# Patient Record
Sex: FEMALE | Race: WHITE | NOT HISPANIC OR LATINO
[De-identification: names, ages, dates, MRNs, and addresses within clinical notes are randomized per-mention and may not be internally consistent; named-entity substitution may affect disease eponyms.]

---

## 2017-11-16 ENCOUNTER — APPOINTMENT (OUTPATIENT)
Dept: FAMILY MEDICINE | Facility: CLINIC | Age: 47
End: 2017-11-16
Payer: COMMERCIAL

## 2017-11-16 VITALS
BODY MASS INDEX: 41.83 KG/M2 | TEMPERATURE: 98 F | WEIGHT: 245 LBS | OXYGEN SATURATION: 98 % | HEART RATE: 88 BPM | HEIGHT: 64 IN | DIASTOLIC BLOOD PRESSURE: 90 MMHG | SYSTOLIC BLOOD PRESSURE: 132 MMHG

## 2017-11-16 PROCEDURE — 90686 IIV4 VACC NO PRSV 0.5 ML IM: CPT

## 2017-11-16 PROCEDURE — G0008: CPT

## 2017-11-16 PROCEDURE — 99396 PREV VISIT EST AGE 40-64: CPT | Mod: 25

## 2017-11-16 PROCEDURE — 36415 COLL VENOUS BLD VENIPUNCTURE: CPT

## 2017-11-21 LAB
25(OH)D3 SERPL-MCNC: 38.3 NG/ML
ALBUMIN SERPL ELPH-MCNC: 4.4 G/DL
ALP BLD-CCNC: 107 U/L
ALT SERPL-CCNC: 23 U/L
ANION GAP SERPL CALC-SCNC: 20 MMOL/L
APPEARANCE: CLEAR
AST SERPL-CCNC: 17 U/L
BASOPHILS # BLD AUTO: 0.01 K/UL
BASOPHILS NFR BLD AUTO: 0.1 %
BILIRUB SERPL-MCNC: <0.2 MG/DL
BILIRUBIN URINE: NEGATIVE
BLOOD URINE: NEGATIVE
BUN SERPL-MCNC: 13 MG/DL
CALCIUM SERPL-MCNC: 9.2 MG/DL
CHLORIDE SERPL-SCNC: 101 MMOL/L
CHOLEST SERPL-MCNC: 205 MG/DL
CHOLEST/HDLC SERPL: 3.6 RATIO
CO2 SERPL-SCNC: 23 MMOL/L
COLOR: YELLOW
CREAT SERPL-MCNC: 0.93 MG/DL
EOSINOPHIL # BLD AUTO: 0.11 K/UL
EOSINOPHIL NFR BLD AUTO: 1.1 %
GLUCOSE QUALITATIVE U: NEGATIVE MG/DL
GLUCOSE SERPL-MCNC: 212 MG/DL
HBA1C MFR BLD HPLC: 8.4 %
HCT VFR BLD CALC: 40.4 %
HDLC SERPL-MCNC: 57 MG/DL
HGB BLD-MCNC: 13.2 G/DL
IMM GRANULOCYTES NFR BLD AUTO: 0.3 %
KETONES URINE: NEGATIVE
LDLC SERPL CALC-MCNC: 123 MG/DL
LEUKOCYTE ESTERASE URINE: NEGATIVE
LYMPHOCYTES # BLD AUTO: 2.54 K/UL
LYMPHOCYTES NFR BLD AUTO: 24.6 %
MAN DIFF?: NORMAL
MCHC RBC-ENTMCNC: 29.5 PG
MCHC RBC-ENTMCNC: 32.7 GM/DL
MCV RBC AUTO: 90.4 FL
MONOCYTES # BLD AUTO: 0.61 K/UL
MONOCYTES NFR BLD AUTO: 5.9 %
NEUTROPHILS # BLD AUTO: 7.01 K/UL
NEUTROPHILS NFR BLD AUTO: 68 %
NITRITE URINE: NEGATIVE
PH URINE: 5
PLATELET # BLD AUTO: 274 K/UL
POTASSIUM SERPL-SCNC: 4.7 MMOL/L
PROT SERPL-MCNC: 7.4 G/DL
PROTEIN URINE: NEGATIVE MG/DL
RBC # BLD: 4.47 M/UL
RBC # FLD: 13.7 %
SODIUM SERPL-SCNC: 144 MMOL/L
SPECIFIC GRAVITY URINE: 1.02
TRIGL SERPL-MCNC: 123 MG/DL
TSH SERPL-ACNC: 1.13 UIU/ML
UROBILINOGEN URINE: NEGATIVE MG/DL
WBC # FLD AUTO: 10.31 K/UL

## 2017-11-30 ENCOUNTER — APPOINTMENT (OUTPATIENT)
Dept: FAMILY MEDICINE | Facility: CLINIC | Age: 47
End: 2017-11-30
Payer: COMMERCIAL

## 2017-11-30 VITALS
HEIGHT: 64 IN | BODY MASS INDEX: 42 KG/M2 | HEART RATE: 84 BPM | WEIGHT: 246 LBS | SYSTOLIC BLOOD PRESSURE: 130 MMHG | DIASTOLIC BLOOD PRESSURE: 80 MMHG

## 2017-11-30 DIAGNOSIS — Z87.09 PERSONAL HISTORY OF OTHER DISEASES OF THE RESPIRATORY SYSTEM: ICD-10-CM

## 2017-11-30 DIAGNOSIS — Z87.898 PERSONAL HISTORY OF OTHER SPECIFIED CONDITIONS: ICD-10-CM

## 2017-11-30 PROCEDURE — 99214 OFFICE O/P EST MOD 30 MIN: CPT

## 2018-01-25 ENCOUNTER — APPOINTMENT (OUTPATIENT)
Dept: FAMILY MEDICINE | Facility: CLINIC | Age: 48
End: 2018-01-25
Payer: COMMERCIAL

## 2018-01-25 VITALS
BODY MASS INDEX: 41.57 KG/M2 | HEART RATE: 90 BPM | DIASTOLIC BLOOD PRESSURE: 86 MMHG | SYSTOLIC BLOOD PRESSURE: 138 MMHG | WEIGHT: 243.5 LBS | HEIGHT: 64 IN

## 2018-01-25 VITALS — DIASTOLIC BLOOD PRESSURE: 84 MMHG | SYSTOLIC BLOOD PRESSURE: 124 MMHG

## 2018-01-25 PROCEDURE — 99214 OFFICE O/P EST MOD 30 MIN: CPT | Mod: 25

## 2018-01-25 PROCEDURE — 36415 COLL VENOUS BLD VENIPUNCTURE: CPT

## 2018-01-27 LAB
ALBUMIN SERPL ELPH-MCNC: 4.6 G/DL
ALP BLD-CCNC: 93 U/L
ALT SERPL-CCNC: 35 U/L
ANION GAP SERPL CALC-SCNC: 17 MMOL/L
AST SERPL-CCNC: 24 U/L
BASOPHILS # BLD AUTO: 0.02 K/UL
BASOPHILS NFR BLD AUTO: 0.2 %
BILIRUB SERPL-MCNC: 0.2 MG/DL
BUN SERPL-MCNC: 15 MG/DL
CALCIUM SERPL-MCNC: 9.8 MG/DL
CHLORIDE SERPL-SCNC: 99 MMOL/L
CO2 SERPL-SCNC: 26 MMOL/L
CREAT SERPL-MCNC: 0.98 MG/DL
EOSINOPHIL # BLD AUTO: 0.09 K/UL
EOSINOPHIL NFR BLD AUTO: 1 %
GLUCOSE SERPL-MCNC: 134 MG/DL
HBA1C MFR BLD HPLC: 7.9 %
HCT VFR BLD CALC: 42.3 %
HGB BLD-MCNC: 13.8 G/DL
IMM GRANULOCYTES NFR BLD AUTO: 0.4 %
LYMPHOCYTES # BLD AUTO: 2.5 K/UL
LYMPHOCYTES NFR BLD AUTO: 26.9 %
MAN DIFF?: NORMAL
MCHC RBC-ENTMCNC: 29.3 PG
MCHC RBC-ENTMCNC: 32.6 GM/DL
MCV RBC AUTO: 89.8 FL
MONOCYTES # BLD AUTO: 0.56 K/UL
MONOCYTES NFR BLD AUTO: 6 %
NEUTROPHILS # BLD AUTO: 6.07 K/UL
NEUTROPHILS NFR BLD AUTO: 65.5 %
PLATELET # BLD AUTO: 311 K/UL
POTASSIUM SERPL-SCNC: 4.4 MMOL/L
PROT SERPL-MCNC: 7.7 G/DL
RBC # BLD: 4.71 M/UL
RBC # FLD: 13 %
SODIUM SERPL-SCNC: 142 MMOL/L
WBC # FLD AUTO: 9.28 K/UL

## 2018-04-30 ENCOUNTER — APPOINTMENT (OUTPATIENT)
Dept: FAMILY MEDICINE | Facility: CLINIC | Age: 48
End: 2018-04-30
Payer: COMMERCIAL

## 2018-04-30 VITALS
SYSTOLIC BLOOD PRESSURE: 120 MMHG | BODY MASS INDEX: 42.34 KG/M2 | DIASTOLIC BLOOD PRESSURE: 70 MMHG | WEIGHT: 248 LBS | HEIGHT: 64 IN | HEART RATE: 84 BPM

## 2018-04-30 PROCEDURE — 36415 COLL VENOUS BLD VENIPUNCTURE: CPT

## 2018-04-30 PROCEDURE — 99214 OFFICE O/P EST MOD 30 MIN: CPT | Mod: 25

## 2018-05-09 LAB
ALBUMIN SERPL ELPH-MCNC: 4.5 G/DL
ALP BLD-CCNC: 97 U/L
ALT SERPL-CCNC: 28 U/L
ANION GAP SERPL CALC-SCNC: 16 MMOL/L
AST SERPL-CCNC: 17 U/L
BASOPHILS # BLD AUTO: 0.02 K/UL
BASOPHILS NFR BLD AUTO: 0.2 %
BILIRUB SERPL-MCNC: 0.3 MG/DL
BUN SERPL-MCNC: 14 MG/DL
CALCIUM SERPL-MCNC: 9.7 MG/DL
CHLORIDE SERPL-SCNC: 99 MMOL/L
CHOLEST SERPL-MCNC: 223 MG/DL
CHOLEST/HDLC SERPL: 4.6 RATIO
CO2 SERPL-SCNC: 26 MMOL/L
CREAT SERPL-MCNC: 0.85 MG/DL
EOSINOPHIL # BLD AUTO: 0.09 K/UL
EOSINOPHIL NFR BLD AUTO: 1 %
GLUCOSE SERPL-MCNC: 194 MG/DL
HCT VFR BLD CALC: 45.5 %
HDLC SERPL-MCNC: 49 MG/DL
HGB BLD-MCNC: 14 G/DL
IMM GRANULOCYTES NFR BLD AUTO: 0.5 %
LDLC SERPL CALC-MCNC: 151 MG/DL
LYMPHOCYTES # BLD AUTO: 2.28 K/UL
LYMPHOCYTES NFR BLD AUTO: 26.5 %
MAN DIFF?: NORMAL
MCHC RBC-ENTMCNC: 29.2 PG
MCHC RBC-ENTMCNC: 30.8 GM/DL
MCV RBC AUTO: 95 FL
MONOCYTES # BLD AUTO: 0.41 K/UL
MONOCYTES NFR BLD AUTO: 4.8 %
NEUTROPHILS # BLD AUTO: 5.77 K/UL
NEUTROPHILS NFR BLD AUTO: 67 %
PLATELET # BLD AUTO: 302 K/UL
POTASSIUM SERPL-SCNC: 4.8 MMOL/L
PROT SERPL-MCNC: 7.7 G/DL
RBC # BLD: 4.79 M/UL
RBC # FLD: 13.6 %
SODIUM SERPL-SCNC: 141 MMOL/L
TRIGL SERPL-MCNC: 117 MG/DL
WBC # FLD AUTO: 8.61 K/UL

## 2018-05-15 LAB — HBA1C MFR BLD HPLC: 8.3 %

## 2018-07-30 ENCOUNTER — APPOINTMENT (OUTPATIENT)
Dept: FAMILY MEDICINE | Facility: CLINIC | Age: 48
End: 2018-07-30
Payer: COMMERCIAL

## 2018-07-30 VITALS
DIASTOLIC BLOOD PRESSURE: 88 MMHG | SYSTOLIC BLOOD PRESSURE: 150 MMHG | HEART RATE: 82 BPM | WEIGHT: 246 LBS | BODY MASS INDEX: 42 KG/M2 | HEIGHT: 64 IN

## 2018-07-30 PROCEDURE — 36415 COLL VENOUS BLD VENIPUNCTURE: CPT

## 2018-07-30 PROCEDURE — 99214 OFFICE O/P EST MOD 30 MIN: CPT | Mod: 25

## 2018-07-30 NOTE — COUNSELING
[Weight management counseling provided] : Weight management [Activity counseling provided] : activity [Target Wt Loss Goal ___] : Target weight loss goal [unfilled] lbs [Weight Self Once Weekly] : Weight self once weekly [Walking] : Walking [___ min/wk activity recom] : [unfilled] min/wk activity recommended [Decrease Portions] : Decrease food portions [Take medications as directed] : Take medications as directed [Keep FS  log to bring to next visit] : Keep finger stick log and  bring  to next visit

## 2018-08-01 NOTE — REVIEW OF SYSTEMS
[see HPI] : see HPI [Negative] : Neurological [Fever] : no fever [Chills] : no chills [Feeling Poorly] : not feeling poorly

## 2018-08-01 NOTE — ASSESSMENT
[FreeTextEntry1] : \par DM   as in HPI   last A1c  7.9 \par Will request consult. \par Advised FU . \par \par LAbs in office today .\par \par Obesity   as in HPI and Plan\par \par Depression   stable  continue FU with NP \par Much support rendered.

## 2018-08-01 NOTE — PHYSICAL EXAM
[General Appearance - Alert] : alert [General Appearance - In No Acute Distress] : in no acute distress [Sclera] : the sclera and conjunctiva were normal [Respiration, Rhythm And Depth] : normal respiratory rhythm and effort [Auscultation Breath Sounds / Voice Sounds] : lungs were clear to auscultation bilaterally [Heart Rate And Rhythm] : heart rate was normal and rhythm regular [Heart Sounds] : normal S1 and S2 [Skin Turgor] : normal skin turgor [Oriented To Time, Place, And Person] : oriented to person, place, and time [FreeTextEntry1] : warm and dry

## 2018-08-01 NOTE — HISTORY OF PRESENT ILLNESS
[Depression] : Depression [Diabetes Mellitus] : Diabetes Mellitus [Obesity] : Obesity [Patient was last seen on ___] : Patient was last seen on [unfilled] [No episodes] : No hypoglycemic episodes since the last visit. [Check glucose ___ x/day] : Patient checks  blood glucose [unfilled]  times a day [Fasting:  ___] : Fasting Blood Sugar: [unfilled] mg/dL [Post-Prandial: ___] : Post-Prandial Blood Sugar: [unfilled] mg/dL [] : sometimes in range [Most Recent A1C: ___] : Most recent A1C was [unfilled] [Target A1C:  ___] : Target A1C is [unfilled] [At intermediate goal] : A1C at intermediate goal [No therapy] : Patient is not on statin therapy [Weight Gain ___ Pounds] : Weight gain of [unfilled] pounds [None] : No weight lost attempts [___ times per week] : Patient exercises [unfilled] times per week [Action] : Action: patient is following a plan to lose weight [Needs reinforcement, provided] : Patient needs reinforcement on understanding of disease, goals and obesity follow-up plan; reinforcement was provided [No New Symptoms] : Patient denies any new symptoms [Stable] : Overall status has been stable [FreeTextEntry6] : Endorses that she consulted with endocrinology Dr. JOBY Alberts and metformin extended release was advised to do GI complaints.\par She is now wearing a glucose monitoring disc on her LEFT upper arm which he started this morning.\par \par States depression "up and down" due to continue legal concern and care of ailing mother. \par COntinues support with psych .   no change in medications. \par \par \par \par HM:  Mammo  Oct. 2017  BiRads 1\par        Colonoscopy   FH of Colon Cancer ( sister) .  2015   Dr. Dulce MUIR in 5 years.  [de-identified] : efforts "good" but has sugar needs  [de-identified] : Follows with N  { Brig[facility] Pierre in [place]   [FreeTextEntry1] : MF initiated in  Nov. 2017 \par much encouragement discussion with Saumya with regard to pathophysiology of diabetes and need for her to engage in herself.  She agrees to try.  At this encounter.  We agree to increase her exercise efforts with her daily walk totaling 40-45 minutes most days of the week.  Decrease sweets and limit to once weekly.\par \par \par In review: Jan. 2018 \par Today presents in FU  reporting compliance with medications.  NO SE. \par HGM  140- 200\par Insurance does not cover Diabetic wellness .   WIll refer to nutritionist. \par Stressors in her life  has lessened. \par In review:  Nov 2017\par Today presents at my request for review of labs most notably with  A1c 8.4 ( 6.8 one year ago ).\par Weight  246 with BMI of 42.23. \par Denies any POLY symptoms. \par FH of DM  in both parents.   Type 2 \par Continues to have considerable stressors with regard to family demands.

## 2018-08-04 LAB
ALBUMIN SERPL ELPH-MCNC: 4.6 G/DL
ALP BLD-CCNC: 89 U/L
ALT SERPL-CCNC: 32 U/L
ANION GAP SERPL CALC-SCNC: 15 MMOL/L
AST SERPL-CCNC: 22 U/L
BASOPHILS # BLD AUTO: 0.02 K/UL
BASOPHILS NFR BLD AUTO: 0.3 %
BILIRUB SERPL-MCNC: 0.3 MG/DL
BUN SERPL-MCNC: 13 MG/DL
CALCIUM SERPL-MCNC: 9.3 MG/DL
CHLORIDE SERPL-SCNC: 99 MMOL/L
CO2 SERPL-SCNC: 25 MMOL/L
CREAT SERPL-MCNC: 0.73 MG/DL
EOSINOPHIL # BLD AUTO: 0.09 K/UL
EOSINOPHIL NFR BLD AUTO: 1.3 %
GLUCOSE SERPL-MCNC: 204 MG/DL
HBA1C MFR BLD HPLC: 8.7 %
HCT VFR BLD CALC: 43.7 %
HGB BLD-MCNC: 13.7 G/DL
IMM GRANULOCYTES NFR BLD AUTO: 0.4 %
LYMPHOCYTES # BLD AUTO: 2.07 K/UL
LYMPHOCYTES NFR BLD AUTO: 29.7 %
MAN DIFF?: NORMAL
MCHC RBC-ENTMCNC: 29.1 PG
MCHC RBC-ENTMCNC: 31.4 GM/DL
MCV RBC AUTO: 93 FL
MONOCYTES # BLD AUTO: 0.33 K/UL
MONOCYTES NFR BLD AUTO: 4.7 %
NEUTROPHILS # BLD AUTO: 4.43 K/UL
NEUTROPHILS NFR BLD AUTO: 63.6 %
PLATELET # BLD AUTO: 260 K/UL
POTASSIUM SERPL-SCNC: 4.7 MMOL/L
PROT SERPL-MCNC: 7.1 G/DL
RBC # BLD: 4.7 M/UL
RBC # FLD: 13.8 %
SODIUM SERPL-SCNC: 139 MMOL/L
TSH SERPL-ACNC: 1.35 UIU/ML
WBC # FLD AUTO: 6.97 K/UL

## 2018-11-07 ENCOUNTER — APPOINTMENT (OUTPATIENT)
Dept: FAMILY MEDICINE | Facility: CLINIC | Age: 48
End: 2018-11-07
Payer: COMMERCIAL

## 2018-11-07 VITALS
DIASTOLIC BLOOD PRESSURE: 82 MMHG | WEIGHT: 244 LBS | BODY MASS INDEX: 41.66 KG/M2 | SYSTOLIC BLOOD PRESSURE: 140 MMHG | HEART RATE: 88 BPM | HEIGHT: 64 IN

## 2018-11-07 PROCEDURE — 36415 COLL VENOUS BLD VENIPUNCTURE: CPT

## 2018-11-07 PROCEDURE — 90686 IIV4 VACC NO PRSV 0.5 ML IM: CPT

## 2018-11-07 PROCEDURE — 99214 OFFICE O/P EST MOD 30 MIN: CPT | Mod: 25

## 2018-11-07 PROCEDURE — G0008: CPT

## 2018-11-07 RX ORDER — METFORMIN HYDROCHLORIDE 500 MG/1
500 TABLET, COATED ORAL DAILY
Qty: 1 | Refills: 0 | Status: DISCONTINUED | COMMUNITY
Start: 2017-11-30 | End: 2018-11-07

## 2018-11-10 NOTE — HISTORY OF PRESENT ILLNESS
[Depression] : Depression [Diabetes Mellitus] : Diabetes Mellitus [Obesity] : Obesity [No episodes] : No hypoglycemic episodes since the last visit. [Check glucose ___ x/day] : Patient checks  blood glucose [unfilled]  times a day [Fasting:  ___] : Fasting Blood Sugar: [unfilled] mg/dL [Post-Prandial: ___] : Post-Prandial Blood Sugar: [unfilled] mg/dL [] : sometimes in range [Target A1C:  ___] : Target A1C is [unfilled] [At intermediate goal] : A1C at intermediate goal [No therapy] : Patient is not on statin therapy [Weight Gain ___ Pounds] : Weight gain of [unfilled] pounds [None] : No weight lost attempts [___ times per week] : Patient exercises [unfilled] times per week [Action] : Action: patient is following a plan to lose weight [Needs reinforcement, provided] : Patient needs reinforcement on understanding of disease, goals and obesity follow-up plan; reinforcement was provided [No New Symptoms] : Patient denies any new symptoms [Stable] : Overall status has been stable [FreeTextEntry1] : MF initiated in  Nov. 2017 \par much encouragement discussion with Saumya with regard to pathophysiology of diabetes and need for her to engage in herself.  She agrees to try.  At this encounter.  We agree to increase her exercise efforts with her daily walk totaling 40-45 minutes most days of the week.  Decrease sweets and limit to once weekly.\par \par \par In review: Jan. 2018 \par Today presents in FU  reporting compliance with medications.  NO SE. \par HGM  140- 200\par Insurance does not cover Diabetic wellness .   WIll refer to nutritionist. \par Stressors in her life  has lessened. \par In review:  Nov 2017\par Today presents at my request for review of labs most notably with  A1c 8.4 ( 6.8 one year ago ).\par Weight  246 with BMI of 42.23. \par Denies any POLY symptoms. \par FH of DM  in both parents.   Type 2 \par Continues to have considerable stressors with regard to family demands.  [Patient was last seen on ___] : Patient was last seen on [unfilled] [FreeTextEntry6] : Today presents in RFU on chronic medical conditions. \par States she has been "OK" and \par Denies any recent ER visits/hospitalizations/ MVA's or MSK injuries.\par Stable medical and social issues. \par \par \par In review July 30 2018 \par Endorses that she consulted with endocrinology Dr. JOBY Alberts and metformin extended release was advised to do GI complaints.\par She is now wearing a glucose monitoring disc on her LEFT upper arm which he started this morning.\par \par States depression "up and down" due to continue legal concern and care of ailing mother. \par COntinues support with psych .   no change in medications. \par \par \par \par HM:  Mammo  Oct. 2017  BiRads 1\par        Colonoscopy   FH of Colon Cancer ( sister) .  2015   Dr. Dulce MUIR in 5 years.  [Most Recent A1C: ___] : Most recent A1C was [unfilled] [EyeExamDate] : 7/18 [de-identified] : efforts "good" but has sugar needs  [de-identified] : Follows with AKASH Pierre in [place]

## 2018-11-12 LAB
ALBUMIN SERPL ELPH-MCNC: 4.5 G/DL
ALP BLD-CCNC: 86 U/L
ALT SERPL-CCNC: 37 U/L
ANION GAP SERPL CALC-SCNC: 16 MMOL/L
AST SERPL-CCNC: 17 U/L
BASOPHILS # BLD AUTO: 0.03 K/UL
BASOPHILS NFR BLD AUTO: 0.3 %
BILIRUB SERPL-MCNC: 0.3 MG/DL
BUN SERPL-MCNC: 12 MG/DL
CALCIUM SERPL-MCNC: 9.8 MG/DL
CHLORIDE SERPL-SCNC: 98 MMOL/L
CO2 SERPL-SCNC: 25 MMOL/L
CREAT SERPL-MCNC: 0.83 MG/DL
EOSINOPHIL # BLD AUTO: 0.12 K/UL
EOSINOPHIL NFR BLD AUTO: 1.3 %
GLUCOSE SERPL-MCNC: 163 MG/DL
HBA1C MFR BLD HPLC: 8 %
HCT VFR BLD CALC: 41.8 %
HGB BLD-MCNC: 13.7 G/DL
IMM GRANULOCYTES NFR BLD AUTO: 0.3 %
LYMPHOCYTES # BLD AUTO: 2.68 K/UL
LYMPHOCYTES NFR BLD AUTO: 28.8 %
MAN DIFF?: NORMAL
MCHC RBC-ENTMCNC: 29.7 PG
MCHC RBC-ENTMCNC: 32.8 GM/DL
MCV RBC AUTO: 90.5 FL
MONOCYTES # BLD AUTO: 0.42 K/UL
MONOCYTES NFR BLD AUTO: 4.5 %
NEUTROPHILS # BLD AUTO: 6.02 K/UL
NEUTROPHILS NFR BLD AUTO: 64.8 %
PLATELET # BLD AUTO: 263 K/UL
POTASSIUM SERPL-SCNC: 4.8 MMOL/L
PROT SERPL-MCNC: 7.4 G/DL
RBC # BLD: 4.62 M/UL
RBC # FLD: 13.9 %
SODIUM SERPL-SCNC: 139 MMOL/L
WBC # FLD AUTO: 9.3 K/UL

## 2018-11-26 ENCOUNTER — MEDICATION RENEWAL (OUTPATIENT)
Age: 48
End: 2018-11-26

## 2019-02-04 ENCOUNTER — APPOINTMENT (OUTPATIENT)
Dept: FAMILY MEDICINE | Facility: CLINIC | Age: 49
End: 2019-02-04
Payer: COMMERCIAL

## 2019-02-04 VITALS
HEIGHT: 64 IN | WEIGHT: 243 LBS | SYSTOLIC BLOOD PRESSURE: 112 MMHG | BODY MASS INDEX: 41.48 KG/M2 | DIASTOLIC BLOOD PRESSURE: 70 MMHG | HEART RATE: 84 BPM

## 2019-02-04 DIAGNOSIS — Z23 ENCOUNTER FOR IMMUNIZATION: ICD-10-CM

## 2019-02-04 PROCEDURE — 36415 COLL VENOUS BLD VENIPUNCTURE: CPT

## 2019-02-04 PROCEDURE — 99396 PREV VISIT EST AGE 40-64: CPT | Mod: 25

## 2019-02-04 NOTE — PHYSICAL EXAM
[General Appearance - Alert] : alert [General Appearance - In No Acute Distress] : in no acute distress [General Appearance - Well Nourished] : well nourished [Sclera] : the sclera and conjunctiva were normal [PERRL With Normal Accommodation] : pupils were equal in size, round, and reactive to light [Extraocular Movements] : extraocular movements were intact [Outer Ear] : the ears and nose were normal in appearance [Hearing Threshold Finger Rub Not [place]] : hearing was normal [Nasal Cavity] : the nasal mucosa and septum were normal [Oropharynx] : the oropharynx was normal [Neck Cervical Mass (___cm)] : no neck mass was observed [Thyroid Diffuse Enlargement] : the thyroid was not enlarged [Respiration, Rhythm And Depth] : normal respiratory rhythm and effort [Exaggerated Use Of Accessory Muscles For Inspiration] : no accessory muscle use [Heart Rate And Rhythm] : heart rate was normal and rhythm regular [Heart Sounds] : normal S1 and S2 [Murmurs] : no murmurs [Full Pulse] : the pedal pulses are present [Edema] : there was no peripheral edema [Veins - Varicosity Changes] : there were no varicosital changes [Bowel Sounds] : normal bowel sounds [Abdomen Soft] : soft [Abdomen Tenderness] : non-tender [Abdomen Mass (___ Cm)] : no abdominal mass palpated [Cervical Lymph Nodes Enlarged Posterior Bilaterally] : posterior cervical [Cervical Lymph Nodes Enlarged Anterior Bilaterally] : anterior cervical [Supraclavicular Lymph Nodes Enlarged Bilaterally] : supraclavicular [No Spinal Tenderness] : no spinal tenderness [Abnormal Walk] : normal gait [Nail Clubbing] : no clubbing  or cyanosis of the fingernails [Involuntary Movements] : no involuntary movements were seen [Skin Turgor] : normal skin turgor [] : no rash [Deep Tendon Reflexes (DTR)] : deep tendon reflexes were 2+ and symmetric [No Focal Deficits] : no focal deficits [Oriented To Time, Place, And Person] : oriented to person, place, and time [Impaired Insight] : insight and judgment were intact [FreeTextEntry1] : DEEPIKA

## 2019-02-04 NOTE — REVIEW OF SYSTEMS
[Feeling Poorly] : feeling poorly [Eyesight Problems] : eyesight problems [Skin Lesions] : skin lesion [Depression] : depression [Negative] : Neurological [Fever] : no fever [Chills] : no chills [Abdominal Pain] : no abdominal pain [Diarrhea] : no diarrhea [Melena] : no melena

## 2019-02-04 NOTE — HEALTH RISK ASSESSMENT
[Good] : ~his/her~  mood as  good [No falls in past year] : Patient reported no falls in the past year [1] : 2) Feeling down, depressed, or hopeless for several days (1) [Patient reported mammogram was normal] : Patient reported mammogram was normal [None] : None [With Family] : lives with family [Employed] : employed [Single] : single [Feels Safe at Home] : Feels safe at home [Fully functional (bathing, dressing, toileting, transferring, walking, feeding)] : Fully functional (bathing, dressing, toileting, transferring, walking, feeding) [Fully functional (using the telephone, shopping, preparing meals, housekeeping, doing laundry, using] : Fully functional and needs no help or supervision to perform IADLs (using the telephone, shopping, preparing meals, housekeeping, doing laundry, using transportation, managing medications and managing finances) [Smoke Detector] : smoke detector [Seat Belt] :  uses seat belt [] : No [de-identified] : ENDO   Dr. 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

## 2019-02-04 NOTE — HISTORY OF PRESENT ILLNESS
[Health Maintenance] : health maintenance [GYN Evaluation] : gynecology [___ Month(s) Ago] : [unfilled] month(s) ago [Unmarried] : unmarried [Working Full Time] : working full time [Occupation ___] : occupation: [unfilled] [Never Smoked Cigarettes] : has never smoked cigarettes [Rare Use] : rare alcohol use [Never] : has never used illicit drugs [Fair] : fair [Reg. Dental Visits] : She has regular dental visits [Vision Problems] : She complains of vision problems [Hearing Loss] : She denies hearing loss [Healthy Diet] : She consumes a diverse and healthy diet [Regular Exercise] : She exercises regularly [Perimenopausal] : the patient is perimenopausal [Reviewed and Current] : cancer screening reviewed and current [de-identified] : Last month  [de-identified] : Dr. Stroud [de-identified] :  with mother  [FreeTextEntry8] : Walking daily 2-3 miles daily    Follows with nutritionist now monthly  [FreeTextEntry9] : LMP    [de-identified] : Mammogram/ US  2017     at [facility]  BiRads 2 \par DEXA  as  per GYN  2017 \par \par Colonoscopy/ endoscopy   Dr. Cardona    2015    FU 5 years     \par  Earlier screening  due to sister  of colon cancer at age 54\par                             [de-identified] : SEEN in RFU. Nov. 2018  for chronic medical conditions and encounter reviewed. \par Since has been well  and \par Denies any recent ER visits/hospitalizations/ MVA's or MSK injuries.\par \par Has followed  with endocrinology for DM and using glucose meter.  Has FU next month.\par \par Requesting some program for education on diet and will refer again to Wellness program. \par  [FreeTextEntry1] : In review:  11/17 \par Last seen for acute visit in December 2016 and encounter reviewed.\par Condition resolved. \par \par H/A have improved and no longer follows with neurology. \par Weight up a few despite weight watchers.\par Little exercise. \par \par Today presents for CPE .\par Social and PMH reviewed and stable.\par Continues regular FU with Psych. \par \par In review:  Sept. 2016 \par Failed ibuprofen therapy, and she was referred to neurology.   She did see Dr. Rodriguez  and  encounter reviewed.   She states she did go for the MRI and MRA, but did not followup with him.  She did not start the Topamax as planned.  She was also referred to rheumatology. She is unclear as to why and no treatment to date.  LUTHER AB is 1;640;  DNA (DS) negative. \par H/A's had been improved until recent week .  Begin at base of skull and radiate to temple region. \par Saw Dr. Graham, ENT, last week for Right ear fullness and no pathology. \par RIGHT foot pain when standing too long on her feet.  So, podiatry, Dr. Medina Chamberino, and diagnosed with flat feet.  Orthotics were prescribed however, insurance doesn't cover for them.\par States condition is unacceptable with regard to quality of life and advised to see an orthopedist for further characterization.\par Katharine  continues to follow with the nurse practitioner for her psychiatric needs.  Medications reconciled.\par \par \par

## 2019-02-04 NOTE — ASSESSMENT
[FreeTextEntry1] :  Well exam for 49  year old WF with PMH as stated in HPI / active list. \par \par Management : \par \par See HPI and Plan\par \par Labs in office today.   Will advise. \par \par Best wishes offered !\par

## 2019-02-04 NOTE — DISCUSSION/SUMMARY
[Obese (BMI >29.9)] : Obese - BMI >29.9 [Weight loss counseling given] : Weight loss counseling given [150 min/wk aerobic activity @ 60% MHR recommended] : 150 min/wk aerobic activity @ 60% MHR recommended [Non - Smoker] : non-smoker [FreeTextEntry2] : advised !!  [FreeTextEntry1] : Well exam for 47 year old WF.\par \par See HPI and Plan.\par \par Labs in office today.\par \par Flu Vaccine given \par \par

## 2019-02-08 LAB
ALBUMIN SERPL ELPH-MCNC: 4.4 G/DL
ALP BLD-CCNC: 87 U/L
ALT SERPL-CCNC: 35 U/L
ANION GAP SERPL CALC-SCNC: 14 MMOL/L
AST SERPL-CCNC: 21 U/L
BASOPHILS # BLD AUTO: 0.02 K/UL
BASOPHILS NFR BLD AUTO: 0.2 %
BILIRUB SERPL-MCNC: 0.2 MG/DL
BUN SERPL-MCNC: 13 MG/DL
CALCIUM SERPL-MCNC: 9.5 MG/DL
CHLORIDE SERPL-SCNC: 99 MMOL/L
CHOLEST SERPL-MCNC: 198 MG/DL
CHOLEST/HDLC SERPL: 4 RATIO
CO2 SERPL-SCNC: 26 MMOL/L
CREAT SERPL-MCNC: 0.77 MG/DL
CREAT SPEC-SCNC: 189 MG/DL
EOSINOPHIL # BLD AUTO: 0.09 K/UL
EOSINOPHIL NFR BLD AUTO: 1.1 %
FOLATE SERPL-MCNC: >20 NG/ML
GLUCOSE SERPL-MCNC: 194 MG/DL
HBA1C MFR BLD HPLC: 8.6 %
HCT VFR BLD CALC: 43.3 %
HDLC SERPL-MCNC: 50 MG/DL
HGB BLD-MCNC: 13.3 G/DL
IMM GRANULOCYTES NFR BLD AUTO: 0.6 %
LDLC SERPL CALC-MCNC: 129 MG/DL
LYMPHOCYTES # BLD AUTO: 2.31 K/UL
LYMPHOCYTES NFR BLD AUTO: 27.4 %
MAN DIFF?: NORMAL
MCHC RBC-ENTMCNC: 28.6 PG
MCHC RBC-ENTMCNC: 30.7 GM/DL
MCV RBC AUTO: 93.1 FL
MICROALBUMIN 24H UR DL<=1MG/L-MCNC: 2.1 MG/DL
MICROALBUMIN/CREAT 24H UR-RTO: 11 MG/G
MONOCYTES # BLD AUTO: 0.5 K/UL
MONOCYTES NFR BLD AUTO: 5.9 %
NEUTROPHILS # BLD AUTO: 5.47 K/UL
NEUTROPHILS NFR BLD AUTO: 64.8 %
PLATELET # BLD AUTO: 277 K/UL
POTASSIUM SERPL-SCNC: 4.8 MMOL/L
PROT SERPL-MCNC: 7.1 G/DL
RBC # BLD: 4.65 M/UL
RBC # FLD: 13.7 %
SODIUM SERPL-SCNC: 139 MMOL/L
TRIGL SERPL-MCNC: 96 MG/DL
TSH SERPL-ACNC: 1.07 UIU/ML
VIT B12 SERPL-MCNC: 555 PG/ML
WBC # FLD AUTO: 8.44 K/UL

## 2019-03-12 ENCOUNTER — APPOINTMENT (OUTPATIENT)
Dept: CHRONIC DISEASE MANAGEMENT | Facility: CLINIC | Age: 49
End: 2019-03-12
Payer: COMMERCIAL

## 2019-03-12 VITALS — HEIGHT: 64 IN | BODY MASS INDEX: 41.15 KG/M2 | WEIGHT: 241 LBS

## 2019-03-12 PROCEDURE — 96150: CPT

## 2019-04-05 ENCOUNTER — TRANSCRIPTION ENCOUNTER (OUTPATIENT)
Age: 49
End: 2019-04-05

## 2019-04-09 ENCOUNTER — APPOINTMENT (OUTPATIENT)
Dept: CHRONIC DISEASE MANAGEMENT | Facility: CLINIC | Age: 49
End: 2019-04-09
Payer: COMMERCIAL

## 2019-04-09 VITALS — WEIGHT: 231 LBS | HEIGHT: 64 IN | BODY MASS INDEX: 39.44 KG/M2

## 2019-04-09 PROCEDURE — G0108 DIAB MANAGE TRN  PER INDIV: CPT

## 2019-05-14 ENCOUNTER — APPOINTMENT (OUTPATIENT)
Dept: CHRONIC DISEASE MANAGEMENT | Facility: CLINIC | Age: 49
End: 2019-05-14
Payer: COMMERCIAL

## 2019-05-14 VITALS — HEIGHT: 64 IN | WEIGHT: 229 LBS | BODY MASS INDEX: 39.09 KG/M2

## 2019-05-14 PROCEDURE — G0108 DIAB MANAGE TRN  PER INDIV: CPT

## 2019-06-03 ENCOUNTER — APPOINTMENT (OUTPATIENT)
Dept: FAMILY MEDICINE | Facility: CLINIC | Age: 49
End: 2019-06-03
Payer: COMMERCIAL

## 2019-06-03 VITALS
HEIGHT: 64 IN | DIASTOLIC BLOOD PRESSURE: 80 MMHG | HEART RATE: 95 BPM | WEIGHT: 221 LBS | OXYGEN SATURATION: 97 % | BODY MASS INDEX: 37.73 KG/M2 | SYSTOLIC BLOOD PRESSURE: 119 MMHG

## 2019-06-03 PROCEDURE — 99214 OFFICE O/P EST MOD 30 MIN: CPT | Mod: 25

## 2019-06-03 PROCEDURE — 36415 COLL VENOUS BLD VENIPUNCTURE: CPT

## 2019-06-04 LAB
ANION GAP SERPL CALC-SCNC: 16 MMOL/L
BASOPHILS # BLD AUTO: 0.04 K/UL
BASOPHILS NFR BLD AUTO: 0.4 %
BUN SERPL-MCNC: 15 MG/DL
CALCIUM SERPL-MCNC: 10 MG/DL
CHLORIDE SERPL-SCNC: 100 MMOL/L
CO2 SERPL-SCNC: 24 MMOL/L
CREAT SERPL-MCNC: 0.85 MG/DL
EOSINOPHIL # BLD AUTO: 0.06 K/UL
EOSINOPHIL NFR BLD AUTO: 0.5 %
ESTIMATED AVERAGE GLUCOSE: 137 MG/DL
GLUCOSE SERPL-MCNC: 117 MG/DL
HBA1C MFR BLD HPLC: 6.4 %
HCT VFR BLD CALC: 45.6 %
HGB BLD-MCNC: 13.9 G/DL
IMM GRANULOCYTES NFR BLD AUTO: 0.2 %
LYMPHOCYTES # BLD AUTO: 2.09 K/UL
LYMPHOCYTES NFR BLD AUTO: 19.1 %
MAN DIFF?: NORMAL
MCHC RBC-ENTMCNC: 29.2 PG
MCHC RBC-ENTMCNC: 30.5 GM/DL
MCV RBC AUTO: 95.8 FL
MONOCYTES # BLD AUTO: 0.55 K/UL
MONOCYTES NFR BLD AUTO: 5 %
NEUTROPHILS # BLD AUTO: 8.16 K/UL
NEUTROPHILS NFR BLD AUTO: 74.8 %
PLATELET # BLD AUTO: 303 K/UL
POTASSIUM SERPL-SCNC: 4.7 MMOL/L
RBC # BLD: 4.76 M/UL
RBC # FLD: 13.6 %
SODIUM SERPL-SCNC: 139 MMOL/L
WBC # FLD AUTO: 10.92 K/UL

## 2019-06-05 NOTE — HISTORY OF PRESENT ILLNESS
[Diabetes Mellitus] : Diabetes Mellitus [Patient was last seen on ___] : Patient was last seen on [unfilled] [No episodes] : No hypoglycemic episodes since the last visit. [Fasting:  ___] : Fasting Blood Sugar: [unfilled] mg/dL [] : sometimes in range [Post-Prandial: ___] : Post-Prandial Blood Sugar: [unfilled] mg/dL [Target A1C:  ___] : Target A1C is [unfilled] [At intermediate goal] : A1C at intermediate goal [No therapy] : Patient is not on statin therapy [Weight Gain ___ Pounds] : Weight gain of [unfilled] pounds [None] : No weight lost attempts [___ times per week] : Patient exercises [unfilled] times per week [Action] : Action: patient is following a plan to lose weight [Needs reinforcement, provided] : Patient needs reinforcement on understanding of disease, goals and obesity follow-up plan; reinforcement was provided [No New Symptoms] : Patient denies any new symptoms [Stable] : Overall status has been stable [Depression] : Depression [Obesity] : Obesity [FreeTextEntry6] : Last seen in Feb. 2019 for CPE and encounter reviewed.\par She was referred to Diabetes Education and nutrition PRogram and has attended 3 sessions.\par HAS LOST  20  LBS and is walking most days of the week.  [Check glucose ___ x/day] : Patient checks  blood glucose [unfilled]  times a day [Most Recent A1C: ___] : Most recent A1C was [unfilled] [de-identified] : now on trulicity weekly as well!  [de-identified] : efforts "good" but has sugar needs  [de-identified] : Follows with AKASH Pierre in [place]

## 2019-06-05 NOTE — COUNSELING
[Weight management counseling provided] : Weight management [Activity counseling provided] : activity [Target Wt Loss Goal ___] : Target weight loss goal [unfilled] lbs [Weight Self Once Weekly] : Weight self once weekly [Walking] : Walking [___ min/wk activity recom] : [unfilled] min/wk activity recommended [Decrease Portions] : Decrease food portions [Take medications as directed] : Take medications as directed [Keep FS  log to bring to next visit] : Keep finger stick log and  bring  to next visit [de-identified] : Congratulated and advised to continue efforts!!!!\par FU in 2 -3 months.

## 2019-06-11 ENCOUNTER — APPOINTMENT (OUTPATIENT)
Dept: CHRONIC DISEASE MANAGEMENT | Facility: CLINIC | Age: 49
End: 2019-06-11
Payer: COMMERCIAL

## 2019-06-11 VITALS — HEIGHT: 64 IN | BODY MASS INDEX: 37.73 KG/M2 | WEIGHT: 221 LBS

## 2019-06-11 PROCEDURE — G0108 DIAB MANAGE TRN  PER INDIV: CPT

## 2019-06-18 ENCOUNTER — RX RENEWAL (OUTPATIENT)
Age: 49
End: 2019-06-18

## 2019-07-16 ENCOUNTER — APPOINTMENT (OUTPATIENT)
Dept: CHRONIC DISEASE MANAGEMENT | Facility: CLINIC | Age: 49
End: 2019-07-16
Payer: COMMERCIAL

## 2019-07-16 VITALS — BODY MASS INDEX: 36.37 KG/M2 | HEIGHT: 64 IN | WEIGHT: 213 LBS

## 2019-07-16 PROCEDURE — G0108 DIAB MANAGE TRN  PER INDIV: CPT

## 2019-08-12 ENCOUNTER — APPOINTMENT (OUTPATIENT)
Dept: FAMILY MEDICINE | Facility: CLINIC | Age: 49
End: 2019-08-12
Payer: COMMERCIAL

## 2019-08-12 VITALS
BODY MASS INDEX: 36.19 KG/M2 | HEIGHT: 64 IN | SYSTOLIC BLOOD PRESSURE: 110 MMHG | HEART RATE: 92 BPM | WEIGHT: 212 LBS | DIASTOLIC BLOOD PRESSURE: 74 MMHG

## 2019-08-12 PROCEDURE — 99214 OFFICE O/P EST MOD 30 MIN: CPT | Mod: 25

## 2019-08-12 PROCEDURE — 36415 COLL VENOUS BLD VENIPUNCTURE: CPT

## 2019-08-13 NOTE — COUNSELING
[Weight management counseling provided] : Weight management [Activity counseling provided] : activity [Target Wt Loss Goal ___] : Target weight loss goal [unfilled] lbs [Weight Self Once Weekly] : Weight self once weekly [Walking] : Walking [___ min/wk activity recom] : [unfilled] min/wk activity recommended [Decrease Portions] : Decrease food portions [Take medications as directed] : Take medications as directed [Keep FS  log to bring to next visit] : Keep finger stick log and  bring  to next visit [de-identified] : Congratulated and advised to continue efforts!!!!\par FU in 2 -3 months. \par \par Stressed value of DAILY WALK on physical and mental health.

## 2019-08-13 NOTE — HISTORY OF PRESENT ILLNESS
[Depression] : Depression [Diabetes Mellitus] : Diabetes Mellitus [Obesity] : Obesity [Patient was last seen on ___] : Patient was last seen on [unfilled] [No episodes] : No hypoglycemic episodes since the last visit. [Check glucose ___ x/day] : Patient checks  blood glucose [unfilled]  times a day [Fasting:  ___] : Fasting Blood Sugar: [unfilled] mg/dL [Post-Prandial: ___] : Post-Prandial Blood Sugar: [unfilled] mg/dL [] : sometimes in range [Target A1C:  ___] : Target A1C is [unfilled] [At intermediate goal] : A1C at intermediate goal [No therapy] : Patient is not on statin therapy [Action] : Action: patient is following a plan to lose weight [No New Symptoms] : Patient denies any new symptoms [Stable] : Overall status has been stable [Most Recent A1C: ___] : Most recent A1C was [unfilled] [No Weight Changes] : No weight changes [de-identified] : now on trulicity weekly as well!   DIETARY DISCRETION and weight loss impacted FAVORABLY  on A1c. \par She continues with nutritional counseling. \par  [FreeTextEntry6] : Last seen in Feb. 2019 for CPE and encounter reviewed.\par She was referred to Diabetes Education and nutrition PRogram and has attended 3 sessions.\par HAS LOST  20  LBS and is walking most days of the week.  [de-identified] : Maintained weight loss of 30 lbs over the last  6 months  [de-identified] : efforts "good" but has sugar needs  [de-identified] : Follows with AKASH Pierre in [place]   [de-identified] : NP has been on sick leave however.... retuning this week \par Despite considerable concerns in home and family, doing well.

## 2019-08-15 LAB
BASOPHILS # BLD AUTO: 0.04 K/UL
BASOPHILS NFR BLD AUTO: 0.4 %
EOSINOPHIL # BLD AUTO: 0.06 K/UL
EOSINOPHIL NFR BLD AUTO: 0.6 %
ESTIMATED AVERAGE GLUCOSE: 120 MG/DL
HBA1C MFR BLD HPLC: 5.8 %
HCT VFR BLD CALC: 44.1 %
HGB BLD-MCNC: 13.6 G/DL
IMM GRANULOCYTES NFR BLD AUTO: 0.4 %
LYMPHOCYTES # BLD AUTO: 2.25 K/UL
LYMPHOCYTES NFR BLD AUTO: 22.8 %
MAN DIFF?: NORMAL
MCHC RBC-ENTMCNC: 28.8 PG
MCHC RBC-ENTMCNC: 30.8 GM/DL
MCV RBC AUTO: 93.2 FL
MONOCYTES # BLD AUTO: 0.44 K/UL
MONOCYTES NFR BLD AUTO: 4.5 %
NEUTROPHILS # BLD AUTO: 7.04 K/UL
NEUTROPHILS NFR BLD AUTO: 71.3 %
PLATELET # BLD AUTO: 311 K/UL
RBC # BLD: 4.73 M/UL
RBC # FLD: 13.3 %
WBC # FLD AUTO: 9.87 K/UL

## 2019-08-20 ENCOUNTER — APPOINTMENT (OUTPATIENT)
Dept: CHRONIC DISEASE MANAGEMENT | Facility: CLINIC | Age: 49
End: 2019-08-20
Payer: COMMERCIAL

## 2019-08-20 VITALS — WEIGHT: 213 LBS | BODY MASS INDEX: 36.37 KG/M2 | HEIGHT: 64 IN

## 2019-08-20 PROCEDURE — G0108 DIAB MANAGE TRN  PER INDIV: CPT

## 2019-09-24 ENCOUNTER — APPOINTMENT (OUTPATIENT)
Dept: CHRONIC DISEASE MANAGEMENT | Facility: CLINIC | Age: 49
End: 2019-09-24
Payer: COMMERCIAL

## 2019-09-24 VITALS — BODY MASS INDEX: 36.54 KG/M2 | WEIGHT: 214 LBS | HEIGHT: 64 IN

## 2019-09-24 PROCEDURE — G0108 DIAB MANAGE TRN  PER INDIV: CPT

## 2019-10-29 ENCOUNTER — APPOINTMENT (OUTPATIENT)
Dept: CHRONIC DISEASE MANAGEMENT | Facility: CLINIC | Age: 49
End: 2019-10-29
Payer: COMMERCIAL

## 2019-10-29 VITALS — HEIGHT: 64 IN | WEIGHT: 216 LBS | BODY MASS INDEX: 36.88 KG/M2

## 2019-10-29 PROCEDURE — G0108 DIAB MANAGE TRN  PER INDIV: CPT

## 2019-11-11 ENCOUNTER — APPOINTMENT (OUTPATIENT)
Dept: FAMILY MEDICINE | Facility: CLINIC | Age: 49
End: 2019-11-11
Payer: COMMERCIAL

## 2019-11-11 VITALS
HEART RATE: 102 BPM | WEIGHT: 217 LBS | DIASTOLIC BLOOD PRESSURE: 90 MMHG | BODY MASS INDEX: 37.05 KG/M2 | HEIGHT: 64 IN | SYSTOLIC BLOOD PRESSURE: 144 MMHG

## 2019-11-11 VITALS — SYSTOLIC BLOOD PRESSURE: 132 MMHG | TEMPERATURE: 99.6 F | DIASTOLIC BLOOD PRESSURE: 84 MMHG | HEART RATE: 88 BPM

## 2019-11-11 PROCEDURE — 36415 COLL VENOUS BLD VENIPUNCTURE: CPT

## 2019-11-11 PROCEDURE — 99214 OFFICE O/P EST MOD 30 MIN: CPT | Mod: 25

## 2019-11-11 NOTE — HISTORY OF PRESENT ILLNESS
[Cold Symptoms] : cold symptoms [Earache (L)] : pain in left ear [Sore Throat] : sore throat [Congestion] : congestion [Cough] : cough [Chills] : chills [Earache] : earache [Headache] : headache [OTC Remedies] : OTC remedies [At Night] : at night [Worsening] : worsening [Fatigue] : not fatigue [FreeTextEntry5] : r

## 2019-11-11 NOTE — ASSESSMENT
[FreeTextEntry1] :  Given prolonged duration of symptoms  and PE will allow for antibiotic therapy.\par \par Supportive and conservative therapies reviewed and advised: to consider:\par Increase fluids  and REST! \par Consider saline nasal spray/ irrigation  3-4 times daily ie: Ocean or Ayr\par Salt water gargles 3-4 times daily\par Cool mist humidifier Vs steam humification of shower;  consider adding eucalyptus oil drops to shower pan \par Vicks vapo rub to chest \par Acetaminophen/Advil for fever,  headache, myalgias\par Add honey /lemon to warm cliff. \par Cover your mouth when coughing and wash hands frequently to prevent spread to others.\par \par FU in 2-3 days if fails to improve or symptoms worse.\par \par Get FLU vaccine local pharmacy in one week. \par \par See Plan   Will advise.\par

## 2019-11-11 NOTE — PHYSICAL EXAM
[No Acute Distress] : no acute distress [Normal Sclera/Conjunctiva] : normal sclera/conjunctiva [No Lymphadenopathy] : no lymphadenopathy [No Respiratory Distress] : no respiratory distress  [Normal Rate] : normal rate  [Regular Rhythm] : with a regular rhythm [No Rash] : no rash [Deep Tendon Reflexes (DTR)] : deep tendon reflexes were 2+ and symmetric [Alert and Oriented x3] : oriented to person, place, and time [Normal Insight/Judgement] : insight and judgment were intact [de-identified] : appears ill non toxic appearing [de-identified] : TARYN [de-identified] : coarse BS clear with cough [de-identified] : warm and damp

## 2019-11-18 ENCOUNTER — TRANSCRIPTION ENCOUNTER (OUTPATIENT)
Age: 49
End: 2019-11-18

## 2019-11-18 LAB
ALBUMIN SERPL ELPH-MCNC: 4.8 G/DL
ALP BLD-CCNC: 86 U/L
ALT SERPL-CCNC: 27 U/L
ANION GAP SERPL CALC-SCNC: 16 MMOL/L
AST SERPL-CCNC: 17 U/L
BASOPHILS # BLD AUTO: 0.04 K/UL
BASOPHILS NFR BLD AUTO: 0.3 %
BILIRUB SERPL-MCNC: 0.4 MG/DL
BUN SERPL-MCNC: 12 MG/DL
CALCIUM SERPL-MCNC: 9.9 MG/DL
CHLORIDE SERPL-SCNC: 98 MMOL/L
CO2 SERPL-SCNC: 26 MMOL/L
CREAT SERPL-MCNC: 0.8 MG/DL
EOSINOPHIL # BLD AUTO: 0.11 K/UL
EOSINOPHIL NFR BLD AUTO: 0.8 %
ESTIMATED AVERAGE GLUCOSE: 134 MG/DL
GLUCOSE SERPL-MCNC: 107 MG/DL
HBA1C MFR BLD HPLC: 6.3 %
HCT VFR BLD CALC: 44 %
HGB BLD-MCNC: 13.5 G/DL
IMM GRANULOCYTES NFR BLD AUTO: 0.5 %
LYMPHOCYTES # BLD AUTO: 2.71 K/UL
LYMPHOCYTES NFR BLD AUTO: 20.4 %
MAN DIFF?: NORMAL
MCHC RBC-ENTMCNC: 29 PG
MCHC RBC-ENTMCNC: 30.7 GM/DL
MCV RBC AUTO: 94.4 FL
MONOCYTES # BLD AUTO: 0.87 K/UL
MONOCYTES NFR BLD AUTO: 6.6 %
NEUTROPHILS # BLD AUTO: 9.48 K/UL
NEUTROPHILS NFR BLD AUTO: 71.4 %
PLATELET # BLD AUTO: 314 K/UL
POTASSIUM SERPL-SCNC: 4.8 MMOL/L
PROT SERPL-MCNC: 7.3 G/DL
RBC # BLD: 4.66 M/UL
RBC # FLD: 12.7 %
SODIUM SERPL-SCNC: 140 MMOL/L
WBC # FLD AUTO: 13.27 K/UL

## 2019-11-26 ENCOUNTER — APPOINTMENT (OUTPATIENT)
Dept: CHRONIC DISEASE MANAGEMENT | Facility: CLINIC | Age: 49
End: 2019-11-26
Payer: COMMERCIAL

## 2019-11-26 VITALS — HEIGHT: 64 IN | WEIGHT: 214 LBS | BODY MASS INDEX: 36.54 KG/M2

## 2019-11-26 PROCEDURE — G0108 DIAB MANAGE TRN  PER INDIV: CPT

## 2019-12-24 ENCOUNTER — APPOINTMENT (OUTPATIENT)
Dept: CHRONIC DISEASE MANAGEMENT | Facility: CLINIC | Age: 49
End: 2019-12-24
Payer: COMMERCIAL

## 2019-12-24 VITALS — BODY MASS INDEX: 37.22 KG/M2 | HEIGHT: 64 IN | WEIGHT: 218 LBS

## 2019-12-24 PROCEDURE — G0108 DIAB MANAGE TRN  PER INDIV: CPT

## 2020-01-04 ENCOUNTER — RX RENEWAL (OUTPATIENT)
Age: 50
End: 2020-01-04

## 2020-02-11 ENCOUNTER — APPOINTMENT (OUTPATIENT)
Dept: CHRONIC DISEASE MANAGEMENT | Facility: CLINIC | Age: 50
End: 2020-02-11
Payer: COMMERCIAL

## 2020-02-11 VITALS — HEIGHT: 64 IN | WEIGHT: 225 LBS | BODY MASS INDEX: 38.41 KG/M2

## 2020-02-11 PROCEDURE — G0108 DIAB MANAGE TRN  PER INDIV: CPT

## 2020-02-13 ENCOUNTER — APPOINTMENT (OUTPATIENT)
Dept: FAMILY MEDICINE | Facility: CLINIC | Age: 50
End: 2020-02-13
Payer: COMMERCIAL

## 2020-02-13 VITALS
HEIGHT: 64 IN | HEART RATE: 88 BPM | SYSTOLIC BLOOD PRESSURE: 132 MMHG | WEIGHT: 223 LBS | DIASTOLIC BLOOD PRESSURE: 90 MMHG | BODY MASS INDEX: 38.07 KG/M2

## 2020-02-13 PROCEDURE — 99214 OFFICE O/P EST MOD 30 MIN: CPT | Mod: 25

## 2020-02-13 PROCEDURE — 36415 COLL VENOUS BLD VENIPUNCTURE: CPT

## 2020-02-13 RX ORDER — AZITHROMYCIN 250 MG/1
250 TABLET, FILM COATED ORAL
Qty: 1 | Refills: 0 | Status: DISCONTINUED | COMMUNITY
Start: 2019-11-11 | End: 2020-02-13

## 2020-02-13 NOTE — HISTORY OF PRESENT ILLNESS
[FreeTextEntry1] : PT IN OFFICE FOR A 3 MONTH FOLLOW UP.  [Depression] : Depression [Diabetes Mellitus] : Diabetes Mellitus [Obesity] : Obesity [Patient was last seen on ___] : Patient was last seen on [unfilled] [No episodes] : No hypoglycemic episodes since the last visit. [Fasting:  ___] : Fasting Blood Sugar: [unfilled] mg/dL [Post-Prandial: ___] : Post-Prandial Blood Sugar: [unfilled] mg/dL [] : sometimes in range [Target A1C:  ___] : Target A1C is [unfilled] [At intermediate goal] : A1C at intermediate goal [No therapy] : Patient is not on statin therapy [Action] : Action: patient is following a plan to lose weight [No New Symptoms] : Patient denies any new symptoms [Stable] : Overall status has been stable [Most Recent A1C: ___] : Most recent A1C was [unfilled] [Weight Gain ___ Pounds] : Weight gain of [unfilled] pounds [de-identified] : Continues  trulicity weekly as well!   DIETARY DISCRETION and weight loss impacted FAVORABLY  on A1c. \par She stopped nutritional counseling  "Not walking like I should"  WEIGHT LOSS not appreciated in recent months. \par  [de-identified] : efforts "could be better" ... will try again! [de-identified] : Follows with AKASH Pierre in [place]   [de-identified] : Continues regular FU and Medications the same. \par Despite considerable concerns in home and family, doing well.

## 2020-02-13 NOTE — HISTORY OF PRESENT ILLNESS
[FreeTextEntry1] : PT IN OFFICE FOR A 3 MONTH FOLLOW UP.  [Depression] : Depression [Diabetes Mellitus] : Diabetes Mellitus [Patient was last seen on ___] : Patient was last seen on [unfilled] [Obesity] : Obesity [No episodes] : No hypoglycemic episodes since the last visit. [Fasting:  ___] : Fasting Blood Sugar: [unfilled] mg/dL [Post-Prandial: ___] : Post-Prandial Blood Sugar: [unfilled] mg/dL [] : sometimes in range [Target A1C:  ___] : Target A1C is [unfilled] [At intermediate goal] : A1C at intermediate goal [No therapy] : Patient is not on statin therapy [Action] : Action: patient is following a plan to lose weight [No New Symptoms] : Patient denies any new symptoms [Stable] : Overall status has been stable [Most Recent A1C: ___] : Most recent A1C was [unfilled] [Weight Gain ___ Pounds] : Weight gain of [unfilled] pounds [de-identified] : efforts "could be better" ... will try again! [de-identified] : Continues  trulicity weekly as well!   DIETARY DISCRETION and weight loss impacted FAVORABLY  on A1c. \par She stopped nutritional counseling  "Not walking like I should"  WEIGHT LOSS not appreciated in recent months. \par  [de-identified] : Continues regular FU and Medications the same. \par Despite considerable concerns in home and family, doing well.  [de-identified] : Follows with AKASH Pierre in [place]

## 2020-02-13 NOTE — COUNSELING
[Weight management counseling provided] : Weight management [Activity counseling provided] : activity [Target Wt Loss Goal ___] : Target weight loss goal [unfilled] lbs [Weight Self Once Weekly] : Weight self once weekly [Walking] : Walking [___ min/wk activity recom] : [unfilled] min/wk activity recommended [Decrease Portions] : Decrease food portions [Take medications as directed] : Take medications as directed [Keep FS  log to bring to next visit] : Keep finger stick log and  bring  to next visit [de-identified] : Much discussion re: DM /weight and efforts NEEDED to achieve goals "one day at a time"\par \par Stressed value of DAILY WALK on physical and mental health.

## 2020-02-13 NOTE — COUNSELING
[Weight management counseling provided] : Weight management [Activity counseling provided] : activity [Target Wt Loss Goal ___] : Target weight loss goal [unfilled] lbs [Weight Self Once Weekly] : Weight self once weekly [Walking] : Walking [___ min/wk activity recom] : [unfilled] min/wk activity recommended [Decrease Portions] : Decrease food portions [Take medications as directed] : Take medications as directed [Keep FS  log to bring to next visit] : Keep finger stick log and  bring  to next visit [de-identified] : Much discussion re: DM /weight and efforts NEEDED to achieve goals "one day at a time"\par \par Stressed value of DAILY WALK on physical and mental health.

## 2020-02-13 NOTE — REVIEW OF SYSTEMS
[see HPI] : see HPI [Negative] : Neurological [Fever] : no fever [Feeling Poorly] : not feeling poorly [Chills] : no chills

## 2020-02-13 NOTE — REVIEW OF SYSTEMS
[see HPI] : see HPI [Negative] : Neurological [Feeling Poorly] : not feeling poorly [Fever] : no fever [Chills] : no chills

## 2020-02-19 LAB
ALBUMIN SERPL ELPH-MCNC: 4.8 G/DL
ALP BLD-CCNC: 82 U/L
ALT SERPL-CCNC: 39 U/L
ANION GAP SERPL CALC-SCNC: 16 MMOL/L
AST SERPL-CCNC: 22 U/L
BASOPHILS # BLD AUTO: 0.06 K/UL
BASOPHILS NFR BLD AUTO: 0.7 %
BILIRUB SERPL-MCNC: 0.3 MG/DL
BUN SERPL-MCNC: 13 MG/DL
CALCIUM SERPL-MCNC: 9.8 MG/DL
CHLORIDE SERPL-SCNC: 99 MMOL/L
CHOLEST SERPL-MCNC: 240 MG/DL
CHOLEST/HDLC SERPL: 4.3 RATIO
CO2 SERPL-SCNC: 24 MMOL/L
CREAT SERPL-MCNC: 0.83 MG/DL
EOSINOPHIL # BLD AUTO: 0.11 K/UL
EOSINOPHIL NFR BLD AUTO: 1.2 %
ESTIMATED AVERAGE GLUCOSE: 146 MG/DL
GLUCOSE SERPL-MCNC: 154 MG/DL
HBA1C MFR BLD HPLC: 6.7 %
HCT VFR BLD CALC: 44.9 %
HDLC SERPL-MCNC: 56 MG/DL
HGB BLD-MCNC: 13.8 G/DL
IMM GRANULOCYTES NFR BLD AUTO: 1 %
LDLC SERPL CALC-MCNC: 162 MG/DL
LYMPHOCYTES # BLD AUTO: 2.6 K/UL
LYMPHOCYTES NFR BLD AUTO: 28.4 %
MAN DIFF?: NORMAL
MCHC RBC-ENTMCNC: 29.2 PG
MCHC RBC-ENTMCNC: 30.7 GM/DL
MCV RBC AUTO: 94.9 FL
MONOCYTES # BLD AUTO: 0.47 K/UL
MONOCYTES NFR BLD AUTO: 5.1 %
NEUTROPHILS # BLD AUTO: 5.83 K/UL
NEUTROPHILS NFR BLD AUTO: 63.6 %
PLATELET # BLD AUTO: 316 K/UL
POTASSIUM SERPL-SCNC: 4.7 MMOL/L
PROT SERPL-MCNC: 7.5 G/DL
RBC # BLD: 4.73 M/UL
RBC # FLD: 12.7 %
SODIUM SERPL-SCNC: 139 MMOL/L
TRIGL SERPL-MCNC: 110 MG/DL
WBC # FLD AUTO: 9.16 K/UL

## 2020-04-27 ENCOUNTER — RX RENEWAL (OUTPATIENT)
Age: 50
End: 2020-04-27

## 2020-06-25 ENCOUNTER — APPOINTMENT (OUTPATIENT)
Dept: FAMILY MEDICINE | Facility: CLINIC | Age: 50
End: 2020-06-25
Payer: COMMERCIAL

## 2020-06-25 VITALS
BODY MASS INDEX: 38.76 KG/M2 | SYSTOLIC BLOOD PRESSURE: 138 MMHG | DIASTOLIC BLOOD PRESSURE: 82 MMHG | HEART RATE: 86 BPM | HEIGHT: 64 IN | WEIGHT: 227 LBS

## 2020-06-25 PROCEDURE — 36415 COLL VENOUS BLD VENIPUNCTURE: CPT

## 2020-06-25 PROCEDURE — 99214 OFFICE O/P EST MOD 30 MIN: CPT | Mod: 25

## 2020-06-28 NOTE — HISTORY OF PRESENT ILLNESS
[FreeTextEntry1] : patient here for follow up with no complaints [Depression] : Depression [Diabetes Mellitus] : Diabetes Mellitus [Obesity] : Obesity [No episodes] : No hypoglycemic episodes since the last visit. [Fasting:  ___] : Fasting Blood Sugar: [unfilled] mg/dL [Post-Prandial: ___] : Post-Prandial Blood Sugar: [unfilled] mg/dL [] : sometimes in range [Target A1C:  ___] : Target A1C is [unfilled] [No therapy] : Patient is not on statin therapy [At intermediate goal] : A1C at intermediate goal [Weight Gain ___ Pounds] : Weight gain of [unfilled] pounds [Action] : Action: patient is following a plan to lose weight [No New Symptoms] : Patient denies any new symptoms [Stable] : Overall status has been stable [Patient was last seen on ___] : Patient was last seen on [unfilled] [Most Recent A1C: ___] : Most recent A1C was [unfilled] [FreeTextEntry6] : Since isolated due to COVID>\par Her mother  from a neurological event however also had a Fall. AUtopsy pending. \par She is grieving appropriately.\par Isolated and works from home. \par \par Complaint with medications .  Glucose sensor in place however monitoring device "broken" HAs FU with  endocrinology next week. \par  [EyeExamDate] : 2/13/20 [de-identified] : Continues  trulicity weekly as well!   DIETARY DISCRETION and weight loss impacted FAVORABLY  on A1c. \par She stopped nutritional counseling  "Not walking like I should"  WEIGHT LOSS not appreciated in recent months. \par  [de-identified] : efforts "could be better" ... will try again! [de-identified] : Follows with AKASH Pierre in [place]   [de-identified] : Continues regular FU and Medications the same. \par Despite considerable concerns in home and family, doing well.

## 2020-06-28 NOTE — HISTORY OF PRESENT ILLNESS
[FreeTextEntry1] : patient here for follow up with no complaints [Depression] : Depression [Diabetes Mellitus] : Diabetes Mellitus [Obesity] : Obesity [No episodes] : No hypoglycemic episodes since the last visit. [Fasting:  ___] : Fasting Blood Sugar: [unfilled] mg/dL [Post-Prandial: ___] : Post-Prandial Blood Sugar: [unfilled] mg/dL [] : sometimes in range [Target A1C:  ___] : Target A1C is [unfilled] [At intermediate goal] : A1C at intermediate goal [No therapy] : Patient is not on statin therapy [Weight Gain ___ Pounds] : Weight gain of [unfilled] pounds [Action] : Action: patient is following a plan to lose weight [No New Symptoms] : Patient denies any new symptoms [Stable] : Overall status has been stable [Patient was last seen on ___] : Patient was last seen on [unfilled] [Most Recent A1C: ___] : Most recent A1C was [unfilled] [FreeTextEntry6] : Since isolated due to COVID>\par Her mother  from a neurological event however also had a Fall. AUtopsy pending. \par She is grieving appropriately.\par Isolated and works from home. \par \par Complaint with medications .  Glucose sensor in place however monitoring device "broken" HAs FU with  endocrinology next week. \par  [EyeExamDate] : 2/13/20 [de-identified] : Continues  trulicity weekly as well!   DIETARY DISCRETION and weight loss impacted FAVORABLY  on A1c. \par She stopped nutritional counseling  "Not walking like I should"  WEIGHT LOSS not appreciated in recent months. \par  [de-identified] : efforts "could be better" ... will try again! [de-identified] : Follows with AKASH Pierre in [place]   [de-identified] : Continues regular FU and Medications the same. \par Despite considerable concerns in home and family, doing well.

## 2020-06-28 NOTE — COUNSELING
[Weight management counseling provided] : Weight management [Activity counseling provided] : activity [Target Wt Loss Goal ___] : Target weight loss goal [unfilled] lbs [Weight Self Once Weekly] : Weight self once weekly [Walking] : Walking [___ min/wk activity recom] : [unfilled] min/wk activity recommended [Decrease Portions] : Decrease food portions [Take medications as directed] : Take medications as directed [Keep FS  log to bring to next visit] : Keep finger stick log and  bring  to next visit [de-identified] : Much discussion re: DM /weight and efforts NEEDED to achieve goals "one day at a time"\par \par Stressed value of DAILY WALK on physical and mental health.

## 2020-06-28 NOTE — ASSESSMENT
[FreeTextEntry1] : as in HPI and PLAN \par \par LAbs in office today and will advise. \par \par Much support rendered!

## 2020-06-28 NOTE — PHYSICAL EXAM
[General Appearance - In No Acute Distress] : in no acute distress [General Appearance - Alert] : alert [Sclera] : the sclera and conjunctiva were normal [Auscultation Breath Sounds / Voice Sounds] : lungs were clear to auscultation bilaterally [Respiration, Rhythm And Depth] : normal respiratory rhythm and effort [Heart Rate And Rhythm] : heart rate was normal and rhythm regular [Heart Sounds] : normal S1 and S2 [Skin Turgor] : normal skin turgor [Oriented To Time, Place, And Person] : oriented to person, place, and time [FreeTextEntry1] : warm and dry

## 2020-06-28 NOTE — COUNSELING
[Weight management counseling provided] : Weight management [Activity counseling provided] : activity [Target Wt Loss Goal ___] : Target weight loss goal [unfilled] lbs [Weight Self Once Weekly] : Weight self once weekly [Walking] : Walking [___ min/wk activity recom] : [unfilled] min/wk activity recommended [Decrease Portions] : Decrease food portions [Take medications as directed] : Take medications as directed [Keep FS  log to bring to next visit] : Keep finger stick log and  bring  to next visit [de-identified] : Much discussion re: DM /weight and efforts NEEDED to achieve goals "one day at a time"\par \par Stressed value of DAILY WALK on physical and mental health.

## 2020-06-28 NOTE — PHYSICAL EXAM
[General Appearance - Alert] : alert [General Appearance - In No Acute Distress] : in no acute distress [Sclera] : the sclera and conjunctiva were normal [Auscultation Breath Sounds / Voice Sounds] : lungs were clear to auscultation bilaterally [Respiration, Rhythm And Depth] : normal respiratory rhythm and effort [Heart Rate And Rhythm] : heart rate was normal and rhythm regular [Heart Sounds] : normal S1 and S2 [Skin Turgor] : normal skin turgor [Oriented To Time, Place, And Person] : oriented to person, place, and time [FreeTextEntry1] : warm and dry

## 2020-06-29 LAB
ALBUMIN SERPL ELPH-MCNC: 4.7 G/DL
ALP BLD-CCNC: 77 U/L
ALT SERPL-CCNC: 44 U/L
ANION GAP SERPL CALC-SCNC: 20 MMOL/L
AST SERPL-CCNC: 29 U/L
BASOPHILS # BLD AUTO: 0.05 K/UL
BASOPHILS NFR BLD AUTO: 0.5 %
BILIRUB SERPL-MCNC: 0.2 MG/DL
BUN SERPL-MCNC: 9 MG/DL
CALCIUM SERPL-MCNC: 10 MG/DL
CHLORIDE SERPL-SCNC: 102 MMOL/L
CHOLEST SERPL-MCNC: 217 MG/DL
CHOLEST/HDLC SERPL: 4.2 RATIO
CO2 SERPL-SCNC: 19 MMOL/L
CREAT SERPL-MCNC: 0.83 MG/DL
EOSINOPHIL # BLD AUTO: 0.12 K/UL
EOSINOPHIL NFR BLD AUTO: 1.2 %
ESTIMATED AVERAGE GLUCOSE: 143 MG/DL
GLUCOSE SERPL-MCNC: 102 MG/DL
HBA1C MFR BLD HPLC: 6.6 %
HCT VFR BLD CALC: 45.7 %
HDLC SERPL-MCNC: 52 MG/DL
HGB BLD-MCNC: 13.6 G/DL
IMM GRANULOCYTES NFR BLD AUTO: 0.5 %
LDLC SERPL CALC-MCNC: 143 MG/DL
LYMPHOCYTES # BLD AUTO: 2.92 K/UL
LYMPHOCYTES NFR BLD AUTO: 28.2 %
MAN DIFF?: NORMAL
MCHC RBC-ENTMCNC: 28.6 PG
MCHC RBC-ENTMCNC: 29.8 GM/DL
MCV RBC AUTO: 96 FL
MONOCYTES # BLD AUTO: 0.58 K/UL
MONOCYTES NFR BLD AUTO: 5.6 %
NEUTROPHILS # BLD AUTO: 6.64 K/UL
NEUTROPHILS NFR BLD AUTO: 64 %
PLATELET # BLD AUTO: 310 K/UL
POTASSIUM SERPL-SCNC: 4.9 MMOL/L
PROT SERPL-MCNC: 7.3 G/DL
RBC # BLD: 4.76 M/UL
RBC # FLD: 13.6 %
SODIUM SERPL-SCNC: 141 MMOL/L
TRIGL SERPL-MCNC: 108 MG/DL
WBC # FLD AUTO: 10.36 K/UL

## 2020-09-16 ENCOUNTER — APPOINTMENT (OUTPATIENT)
Dept: CHRONIC DISEASE MANAGEMENT | Facility: CLINIC | Age: 50
End: 2020-09-16

## 2020-09-16 VITALS — HEIGHT: 64 IN | BODY MASS INDEX: 38.58 KG/M2 | WEIGHT: 226 LBS

## 2020-09-24 ENCOUNTER — APPOINTMENT (OUTPATIENT)
Dept: FAMILY MEDICINE | Facility: CLINIC | Age: 50
End: 2020-09-24
Payer: COMMERCIAL

## 2020-09-24 VITALS
DIASTOLIC BLOOD PRESSURE: 68 MMHG | SYSTOLIC BLOOD PRESSURE: 100 MMHG | TEMPERATURE: 98.4 F | HEART RATE: 96 BPM | BODY MASS INDEX: 38.24 KG/M2 | HEIGHT: 64 IN | WEIGHT: 224 LBS

## 2020-09-24 DIAGNOSIS — Z12.11 ENCOUNTER FOR SCREENING FOR MALIGNANT NEOPLASM OF COLON: ICD-10-CM

## 2020-09-24 PROCEDURE — G0008: CPT

## 2020-09-24 PROCEDURE — 99214 OFFICE O/P EST MOD 30 MIN: CPT | Mod: 25

## 2020-09-24 PROCEDURE — 90686 IIV4 VACC NO PRSV 0.5 ML IM: CPT

## 2020-09-24 PROCEDURE — 36415 COLL VENOUS BLD VENIPUNCTURE: CPT

## 2020-09-24 NOTE — HISTORY OF PRESENT ILLNESS
[FreeTextEntry1] : pt in office for a 3 months check up. + flu shot [Depression] : Depression [Diabetes Mellitus] : Diabetes Mellitus [Hyperlipidemia] : Hyperlipidemia [Obesity] : Obesity [Patient was last seen on ___] : Patient was last seen on [unfilled] [No episodes] : No hypoglycemic episodes since the last visit. [Fasting:  ___] : Fasting Blood Sugar: [unfilled] mg/dL [Post-Prandial: ___] : Post-Prandial Blood Sugar: [unfilled] mg/dL [] : sometimes in range [Most Recent A1C: ___] : Most recent A1C was [unfilled] [Target A1C:  ___] : Target A1C is [unfilled] [At intermediate goal] : A1C at intermediate goal [Moderate Intensity] : Patient is currently on moderate intensity statin  therapy [Initiate use of low- to moderate-dose statins] : Initiate use of low- to moderate-dose statins [Weight Loss ___ Pounds] : Weight loss of [unfilled] pounds [Sedentary] : Patient is sedentary [Action] : Action: patient is following a plan to lose weight [No New Symptoms] : Patient denies any new symptoms [Stable] : Overall status has been stable [FreeTextEntry6] : Today offers no specific complaint.\par Compliant with Meds.\par Has Opthalmology appt. in October. \par Started with Nutritionist ( tele health) and "trying" to eat better.\par Cristina  86% of readings in RANGE! \par \par Social: brother  in July:  unsure cause although long hx substance abuse. \par \par Needs MAMMO Colonoscopy and FLU vaccine.\par \par \par \par In review:  .\par Since isolated due to COVID\par Her mother  from a neurological event however also had a Fall. AUtopsy pending. \par She is grieving appropriately.\par Isolated and works from home. \par \par Complaint with medications .  Glucose sensor in place however monitoring device "broken" HAs FU with  endocrinology next week. \par  [EyeExamDate] : 2/13/20 [de-identified] : Continues  trulicity weekly as well!   DIETARY DISCRETION and weight loss impacted FAVORABLY  on A1c in the past and she is encouraged to do so again! \par \par  [de-identified] : efforts "could be better" ... will try again! [de-identified] : "will start walking again"!  [de-identified] : Follows with AKASH Pierre in [place]   [de-identified] : Continues regular FU and Medications the same. \par Despite considerable concerns in home and family, doing well.

## 2020-09-24 NOTE — COUNSELING
[Weight management counseling provided] : Weight management [Activity counseling provided] : activity [Target Wt Loss Goal ___] : Target weight loss goal [unfilled] lbs [Weight Self Once Weekly] : Weight self once weekly [Walking] : Walking [___ min/wk activity recom] : [unfilled] min/wk activity recommended [Decrease Portions] : Decrease food portions [Take medications as directed] : Take medications as directed [Keep FS  log to bring to next visit] : Keep finger stick log and  bring  to next visit [de-identified] : Much discussion re: DM /weight and efforts NEEDED to achieve goals "one day at a time"\par \par Stressed value of DAILY WALK on physical and mental health.

## 2020-09-24 NOTE — COUNSELING
[Weight management counseling provided] : Weight management [Activity counseling provided] : activity [Target Wt Loss Goal ___] : Target weight loss goal [unfilled] lbs [Weight Self Once Weekly] : Weight self once weekly [Walking] : Walking [___ min/wk activity recom] : [unfilled] min/wk activity recommended [Decrease Portions] : Decrease food portions [Take medications as directed] : Take medications as directed [Keep FS  log to bring to next visit] : Keep finger stick log and  bring  to next visit [de-identified] : Much discussion re: DM /weight and efforts NEEDED to achieve goals "one day at a time"\par \par Stressed value of DAILY WALK on physical and mental health.

## 2020-09-24 NOTE — ASSESSMENT
[FreeTextEntry1] : as in HPI and PLAN \par \par LAbs in office today and will advise. \par \par Much support rendered! 
[FreeTextEntry1] : as in HPI and PLAN \par \par LAbs in office today and will advise. \par \par Much support rendered! 
No

## 2020-09-24 NOTE — HISTORY OF PRESENT ILLNESS
[FreeTextEntry1] : pt in office for a 3 months check up. + flu shot [Depression] : Depression [Diabetes Mellitus] : Diabetes Mellitus [Hyperlipidemia] : Hyperlipidemia [Obesity] : Obesity [Patient was last seen on ___] : Patient was last seen on [unfilled] [No episodes] : No hypoglycemic episodes since the last visit. [Fasting:  ___] : Fasting Blood Sugar: [unfilled] mg/dL [Post-Prandial: ___] : Post-Prandial Blood Sugar: [unfilled] mg/dL [] : sometimes in range [Most Recent A1C: ___] : Most recent A1C was [unfilled] [Target A1C:  ___] : Target A1C is [unfilled] [At intermediate goal] : A1C at intermediate goal [Moderate Intensity] : Patient is currently on moderate intensity statin  therapy [Initiate use of low- to moderate-dose statins] : Initiate use of low- to moderate-dose statins [Weight Loss ___ Pounds] : Weight loss of [unfilled] pounds [Sedentary] : Patient is sedentary [Action] : Action: patient is following a plan to lose weight [No New Symptoms] : Patient denies any new symptoms [Stable] : Overall status has been stable [FreeTextEntry6] : Today offers no specific complaint.\par Compliant with Meds.\par Has Opthalmology appt. in October. \par Started with Nutritionist ( tele health) and "trying" to eat better.\par Cristina  86% of readings in RANGE! \par \par Social: brother  in July:  unsure cause although long hx substance abuse. \par \par Needs MAMMO Colonoscopy and FLU vaccine.\par \par \par \par In review:  .\par Since isolated due to COVID\par Her mother  from a neurological event however also had a Fall. AUtopsy pending. \par She is grieving appropriately.\par Isolated and works from home. \par \par Complaint with medications .  Glucose sensor in place however monitoring device "broken" HAs FU with  endocrinology next week. \par  [EyeExamDate] : 2/13/20 [de-identified] : Continues  trulicity weekly as well!   DIETARY DISCRETION and weight loss impacted FAVORABLY  on A1c in the past and she is encouraged to do so again! \par \par  [de-identified] : efforts "could be better" ... will try again! [de-identified] : "will start walking again"!  [de-identified] : Follows with AKASH Pierre in [place]   [de-identified] : Continues regular FU and Medications the same. \par Despite considerable concerns in home and family, doing well.

## 2020-09-28 LAB
ALBUMIN SERPL ELPH-MCNC: 4.7 G/DL
ALP BLD-CCNC: 98 U/L
ALT SERPL-CCNC: 37 U/L
ANION GAP SERPL CALC-SCNC: 17 MMOL/L
AST SERPL-CCNC: 21 U/L
BASOPHILS # BLD AUTO: 0.06 K/UL
BASOPHILS NFR BLD AUTO: 0.6 %
BILIRUB SERPL-MCNC: 0.3 MG/DL
BUN SERPL-MCNC: 12 MG/DL
CALCIUM SERPL-MCNC: 9.9 MG/DL
CHLORIDE SERPL-SCNC: 99 MMOL/L
CHOLEST SERPL-MCNC: 137 MG/DL
CHOLEST/HDLC SERPL: 3.2 RATIO
CO2 SERPL-SCNC: 24 MMOL/L
CREAT SERPL-MCNC: 0.88 MG/DL
EOSINOPHIL # BLD AUTO: 0.08 K/UL
EOSINOPHIL NFR BLD AUTO: 0.8 %
ESTIMATED AVERAGE GLUCOSE: 151 MG/DL
GLUCOSE SERPL-MCNC: 116 MG/DL
HBA1C MFR BLD HPLC: 6.9 %
HCT VFR BLD CALC: 44.7 %
HDLC SERPL-MCNC: 43 MG/DL
HGB BLD-MCNC: 13.7 G/DL
IMM GRANULOCYTES NFR BLD AUTO: 0.5 %
LDLC SERPL CALC-MCNC: 77 MG/DL
LYMPHOCYTES # BLD AUTO: 2.71 K/UL
LYMPHOCYTES NFR BLD AUTO: 26.4 %
MAN DIFF?: NORMAL
MCHC RBC-ENTMCNC: 29.3 PG
MCHC RBC-ENTMCNC: 30.6 GM/DL
MCV RBC AUTO: 95.7 FL
MONOCYTES # BLD AUTO: 0.63 K/UL
MONOCYTES NFR BLD AUTO: 6.1 %
NEUTROPHILS # BLD AUTO: 6.75 K/UL
NEUTROPHILS NFR BLD AUTO: 65.6 %
PLATELET # BLD AUTO: 352 K/UL
POTASSIUM SERPL-SCNC: 4.9 MMOL/L
PROT SERPL-MCNC: 7.2 G/DL
RBC # BLD: 4.67 M/UL
RBC # FLD: 13 %
SODIUM SERPL-SCNC: 140 MMOL/L
TRIGL SERPL-MCNC: 81 MG/DL
WBC # FLD AUTO: 10.28 K/UL

## 2020-10-14 ENCOUNTER — APPOINTMENT (OUTPATIENT)
Dept: CHRONIC DISEASE MANAGEMENT | Facility: CLINIC | Age: 50
End: 2020-10-14

## 2020-10-14 VITALS — BODY MASS INDEX: 37.9 KG/M2 | WEIGHT: 222 LBS | HEIGHT: 64 IN

## 2020-11-11 ENCOUNTER — NON-APPOINTMENT (OUTPATIENT)
Age: 50
End: 2020-11-11

## 2020-11-11 ENCOUNTER — APPOINTMENT (OUTPATIENT)
Dept: CHRONIC DISEASE MANAGEMENT | Facility: CLINIC | Age: 50
End: 2020-11-11

## 2020-11-11 VITALS — WEIGHT: 225 LBS | BODY MASS INDEX: 38.41 KG/M2 | HEIGHT: 64 IN

## 2020-12-09 ENCOUNTER — APPOINTMENT (OUTPATIENT)
Dept: CHRONIC DISEASE MANAGEMENT | Facility: CLINIC | Age: 50
End: 2020-12-09

## 2020-12-09 ENCOUNTER — NON-APPOINTMENT (OUTPATIENT)
Age: 50
End: 2020-12-09

## 2020-12-09 VITALS — WEIGHT: 221 LBS | BODY MASS INDEX: 37.73 KG/M2 | HEIGHT: 64 IN

## 2020-12-15 ENCOUNTER — LABORATORY RESULT (OUTPATIENT)
Age: 50
End: 2020-12-15

## 2020-12-15 ENCOUNTER — APPOINTMENT (OUTPATIENT)
Dept: FAMILY MEDICINE | Facility: CLINIC | Age: 50
End: 2020-12-15
Payer: COMMERCIAL

## 2020-12-15 VITALS
HEART RATE: 91 BPM | SYSTOLIC BLOOD PRESSURE: 124 MMHG | WEIGHT: 221 LBS | DIASTOLIC BLOOD PRESSURE: 80 MMHG | RESPIRATION RATE: 14 BRPM | BODY MASS INDEX: 37.73 KG/M2 | OXYGEN SATURATION: 99 % | HEIGHT: 64 IN | TEMPERATURE: 97.2 F

## 2020-12-15 PROCEDURE — 99072 ADDL SUPL MATRL&STAF TM PHE: CPT

## 2020-12-15 PROCEDURE — 81003 URINALYSIS AUTO W/O SCOPE: CPT | Mod: QW

## 2020-12-15 PROCEDURE — 99396 PREV VISIT EST AGE 40-64: CPT | Mod: 25

## 2020-12-15 RX ORDER — DULAGLUTIDE 0.75 MG/.5ML
0.75 INJECTION, SOLUTION SUBCUTANEOUS
Qty: 1 | Refills: 1 | Status: DISCONTINUED | COMMUNITY
Start: 2019-06-03 | End: 2020-12-15

## 2020-12-17 ENCOUNTER — RX RENEWAL (OUTPATIENT)
Age: 50
End: 2020-12-17

## 2020-12-17 NOTE — PHYSICAL EXAM
[No Acute Distress] : no acute distress [Normal Sclera/Conjunctiva] : normal sclera/conjunctiva [PERRL] : pupils equal round and reactive to light [Normal Oropharynx] : the oropharynx was normal [Normal TMs] : both tympanic membranes were normal [Thyroid Normal, No Nodules] : the thyroid was normal and there were no nodules present [No Respiratory Distress] : no respiratory distress  [No Accessory Muscle Use] : no accessory muscle use [Clear to Auscultation] : lungs were clear to auscultation bilaterally [Normal Rate] : normal rate  [Regular Rhythm] : with a regular rhythm [No Murmur] : no murmur heard [No Edema] : there was no peripheral edema [Soft] : abdomen soft [Non Tender] : non-tender [No Masses] : no abdominal mass palpated [Normal Posterior Cervical Nodes] : no posterior cervical lymphadenopathy [Normal Anterior Cervical Nodes] : no anterior cervical lymphadenopathy [No Spinal Tenderness] : no spinal tenderness [No Joint Swelling] : no joint swelling [Grossly Normal Strength/Tone] : grossly normal strength/tone [No Focal Deficits] : no focal deficits [Normal Gait] : normal gait [Alert and Oriented x3] : oriented to person, place, and time [Normal Insight/Judgement] : insight and judgment were intact [de-identified] : smiling and engaging  [de-identified] : MASK [de-identified] : OBESE

## 2020-12-17 NOTE — HEALTH RISK ASSESSMENT
[0] : 1) Little interest or pleasure doing things: Not at all (0) [1] : 2) Feeling down, depressed, or hopeless for several days (1) [No falls in past year] : Patient reported no falls in the past year [Patient reported mammogram was normal] : Patient reported mammogram was normal [Patient reported bone density results were normal] : Patient reported bone density results were normal [Patient reported colonoscopy was normal] : Patient reported colonoscopy was normal [None] : None [# of Members in Household ___] :  household currently consist of [unfilled] member(s) [Employed] : employed [College] : College [Single] : single [Fully functional (using the telephone, shopping, preparing meals, housekeeping, doing laundry, using] : Fully functional and needs no help or supervision to perform IADLs (using the telephone, shopping, preparing meals, housekeeping, doing laundry, using transportation, managing medications and managing finances) [Smoke Detector] : smoke detector [Carbon Monoxide Detector] : carbon monoxide detector [Seat Belt] :  uses seat belt [Very Good] : ~his/her~  mood as very good [FreeTextEntry1] : WEIGHT  [] : No [de-identified] : DENIES [de-identified] : Endocrinology   Dr Catia Alberts  PSYcologist as noted n HPI  [de-identified] : RARE  [de-identified] :  enjoys walking her dog  [de-identified] : eating too much bread....  [[id number]] : 1 [Change in mental status noted] : No change in mental status noted [Guns at Home] : no guns at home [Travel to Developing Areas] : does not  travel to developing areas [MammogramDate] : 10/20  [BoneDensityDate] : 11/20  [ColonoscopyDate] : 11/20  [ColonoscopyComments] : Dr. 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

## 2020-12-17 NOTE — REVIEW OF SYSTEMS
[Negative] : Neurological [Fever] : no fever [Chills] : no chills [FreeTextEntry3] : Opthalmology 10/20 TASH MARTIN

## 2020-12-17 NOTE — HISTORY OF PRESENT ILLNESS
[FreeTextEntry1] : CPE \par Last CPE 2/19 and reviewed. \par Multiple encounters with Diabetes Education and Management. for overall stability.\par  [de-identified] : continues therapy with AKASH Buckner  in [place].  [phone]\par \par Overall she feels well and \par Denies any recent ER visits/hospitalizations/ MVA's or MSK injuries.\par \par Adjusting since her mothers death and doing some work on her home.

## 2020-12-17 NOTE — ASSESSMENT
[FreeTextEntry1] : Annual exam for 50  year old  WF with PMH as stated in HPI / active list. \par \par Management : \par \par \par See HPI and Plan\par \par Labs in office today.   Will advise. \par \par Best wishes offered !\par

## 2020-12-17 NOTE — COUNSELING
[de-identified] : AGAIN the value of daily walk, 30 minutes 5 x's per week,  stressed with regard  to overall health, mental health and bone density\par

## 2020-12-22 ENCOUNTER — TRANSCRIPTION ENCOUNTER (OUTPATIENT)
Age: 50
End: 2020-12-22

## 2021-01-01 ENCOUNTER — TRANSCRIPTION ENCOUNTER (OUTPATIENT)
Age: 51
End: 2021-01-01

## 2021-01-08 ENCOUNTER — APPOINTMENT (OUTPATIENT)
Dept: FAMILY MEDICINE | Facility: CLINIC | Age: 51
End: 2021-01-08
Payer: COMMERCIAL

## 2021-01-08 PROCEDURE — 99214 OFFICE O/P EST MOD 30 MIN: CPT

## 2021-01-08 PROCEDURE — 99072 ADDL SUPL MATRL&STAF TM PHE: CPT

## 2021-01-08 NOTE — HISTORY OF PRESENT ILLNESS
[Home] : at home, [unfilled] , at the time of the visit. [Medical Office: ([facility])___] : at the medical office located in  [Verbal consent obtained from patient] : the patient, [unfilled] [FreeTextEntry1] : \par \par SINCE Dec. 27 began to had  dry cough, and headache, which later developed into chills, profound fatigue, and nausea, vomiting, and diarrhea. On January 1, she was tested positive for COVID.\par she has been slowly recovering, however, continues to have a dry cough not associated with shortness of breath or KERR.  Some chest pain when coughing now however.  Cough is nonproductive.\par \par at this time.  We discussed anticipatory guidance with regard to resolution of symptoms can be up to 14 days.\par Will allow for Z-Kirk at this time; advised ibuprofen for aches and pains;  OTC cough medicine.\par \par Call office in 2-3 days to update me on her progress.\par

## 2021-01-11 ENCOUNTER — TRANSCRIPTION ENCOUNTER (OUTPATIENT)
Age: 51
End: 2021-01-11

## 2021-01-13 ENCOUNTER — NON-APPOINTMENT (OUTPATIENT)
Age: 51
End: 2021-01-13

## 2021-01-13 ENCOUNTER — APPOINTMENT (OUTPATIENT)
Dept: CHRONIC DISEASE MANAGEMENT | Facility: CLINIC | Age: 51
End: 2021-01-13

## 2021-01-13 VITALS — WEIGHT: 218 LBS | HEIGHT: 64 IN | BODY MASS INDEX: 37.22 KG/M2

## 2021-02-09 ENCOUNTER — NON-APPOINTMENT (OUTPATIENT)
Age: 51
End: 2021-02-09

## 2021-02-09 ENCOUNTER — APPOINTMENT (OUTPATIENT)
Dept: CHRONIC DISEASE MANAGEMENT | Facility: CLINIC | Age: 51
End: 2021-02-09

## 2021-02-09 VITALS — HEIGHT: 64 IN | BODY MASS INDEX: 37.9 KG/M2 | WEIGHT: 222 LBS

## 2021-03-09 ENCOUNTER — APPOINTMENT (OUTPATIENT)
Dept: CHRONIC DISEASE MANAGEMENT | Facility: CLINIC | Age: 51
End: 2021-03-09

## 2021-03-09 ENCOUNTER — NON-APPOINTMENT (OUTPATIENT)
Age: 51
End: 2021-03-09

## 2021-03-09 VITALS — WEIGHT: 224 LBS | HEIGHT: 64 IN | BODY MASS INDEX: 38.24 KG/M2

## 2021-03-15 ENCOUNTER — APPOINTMENT (OUTPATIENT)
Dept: FAMILY MEDICINE | Facility: CLINIC | Age: 51
End: 2021-03-15
Payer: COMMERCIAL

## 2021-03-15 VITALS
BODY MASS INDEX: 38.93 KG/M2 | HEIGHT: 64 IN | DIASTOLIC BLOOD PRESSURE: 90 MMHG | WEIGHT: 228 LBS | SYSTOLIC BLOOD PRESSURE: 130 MMHG | HEART RATE: 88 BPM | TEMPERATURE: 97.8 F

## 2021-03-15 PROCEDURE — 99072 ADDL SUPL MATRL&STAF TM PHE: CPT

## 2021-03-15 PROCEDURE — 99214 OFFICE O/P EST MOD 30 MIN: CPT

## 2021-03-20 NOTE — HISTORY OF PRESENT ILLNESS
[FreeTextEntry1] : FU on DM and depression /  HLD \par \par Labs in February reviewed: remarkable for :  A 1 C improved  at 6.5 LIPIDS   TC  162 HDL  54 TG 89  LDL  90  Overall GOOD!  [de-identified] : Last seen for  CPE in December 2020 and encounter reviewed. \par Follows with RD and encounters reviewed.\par PAT  continues to struggle with home life and life living with alcoholic brother.... continues counseling and  recent increase in dose of AD. \par \par Cristina  monitoring and endocrinology; in recent week Ozempic was stopped due to ASE nausea and some episodes of vomiting at higher dose; also reports fevers (100.6 max) which lasted briefly and several times throughout the day. Dose decreased however symptoms  persisted.   Now on trulicity since last week.\par \par RECENT labs 2/8/21  A 1 c 6.5  LIPIDS  acceptable \par \par Eye exam and NE  \par

## 2021-03-20 NOTE — COUNSELING
[de-identified] : AGAIN ADVISED OF the value and JERAMY  of daily walk, 30 minutes 5 x's per week,  stressed with regard  to overall health, mental health and bone density\par

## 2021-03-20 NOTE — PHYSICAL EXAM
[No Acute Distress] : no acute distress [Normal Sclera/Conjunctiva] : normal sclera/conjunctiva [No JVD] : no jugular venous distention [No Respiratory Distress] : no respiratory distress  [No Accessory Muscle Use] : no accessory muscle use [Clear to Auscultation] : lungs were clear to auscultation bilaterally [Normal Rate] : normal rate  [Regular Rhythm] : with a regular rhythm [Normal S1, S2] : normal S1 and S2 [Soft] : abdomen soft [No Focal Deficits] : no focal deficits [Alert and Oriented x3] : oriented to person, place, and time [Normal Insight/Judgement] : insight and judgment were intact [de-identified] : calm and engaging

## 2021-04-06 ENCOUNTER — APPOINTMENT (OUTPATIENT)
Dept: CHRONIC DISEASE MANAGEMENT | Facility: CLINIC | Age: 51
End: 2021-04-06

## 2021-04-06 ENCOUNTER — NON-APPOINTMENT (OUTPATIENT)
Age: 51
End: 2021-04-06

## 2021-05-04 ENCOUNTER — NON-APPOINTMENT (OUTPATIENT)
Age: 51
End: 2021-05-04

## 2021-05-14 ENCOUNTER — APPOINTMENT (OUTPATIENT)
Dept: FAMILY MEDICINE | Facility: CLINIC | Age: 51
End: 2021-05-14
Payer: COMMERCIAL

## 2021-05-14 ENCOUNTER — TRANSCRIPTION ENCOUNTER (OUTPATIENT)
Age: 51
End: 2021-05-14

## 2021-05-14 VITALS
OXYGEN SATURATION: 98 % | RESPIRATION RATE: 14 BRPM | WEIGHT: 229 LBS | DIASTOLIC BLOOD PRESSURE: 80 MMHG | SYSTOLIC BLOOD PRESSURE: 136 MMHG | HEIGHT: 64 IN | BODY MASS INDEX: 39.09 KG/M2 | HEART RATE: 89 BPM

## 2021-05-14 PROCEDURE — 99214 OFFICE O/P EST MOD 30 MIN: CPT | Mod: 25

## 2021-05-14 PROCEDURE — 81003 URINALYSIS AUTO W/O SCOPE: CPT | Mod: QW

## 2021-05-14 PROCEDURE — 99072 ADDL SUPL MATRL&STAF TM PHE: CPT

## 2021-05-18 LAB
BILIRUB UR QL STRIP: NORMAL
CLARITY UR: CLEAR
COLLECTION METHOD: NORMAL
GLUCOSE UR-MCNC: NORMAL
HCG UR QL: 0.2 EU/DL
HGB UR QL STRIP.AUTO: NORMAL
KETONES UR-MCNC: NORMAL
LEUKOCYTE ESTERASE UR QL STRIP: NORMAL
NITRITE UR QL STRIP: NORMAL
PH UR STRIP: 6
PROT UR STRIP-MCNC: NORMAL
SP GR UR STRIP: 1.01

## 2021-05-19 NOTE — END OF VISIT
[FreeTextEntry3] : This note was written by Abbey Norman on 05/14/2021, acting as a scribe for Dr. Luis Alberto MD.\par \par All medical record entries were at my, Dr. Zeny Sahu MD, direction and personally dictated by me in 05/14/2021. I have personally reviewed the chart and agree that the record accurately reflects my personal performance of the history, physical exam, assessment, and plan.\par

## 2021-05-19 NOTE — HISTORY OF PRESENT ILLNESS
[FreeTextEntry8] : KORY VILLAGRAN is a 51 year year old female patient presenting to the clinic today abnormal UA. She went to [facility] for a Covid test yesterday, results negative. She had a sore throat, strep test came back negative. She no longer has a fever but still has a sore throat. They completed a chest X-ray, results normal. She had a UA done as well and was advise by the doctor that the urine shows red and white blood cells. The doctor at [facility] advise her to go to the ED for further testing but pt denied. Possibly could be a kidney stones. Shes states when she coughs or sneezes, her left abdominal side hurts.  \par \par

## 2021-05-19 NOTE — ASSESSMENT
[FreeTextEntry1] : KORY VILLAGRAN is a 51 year year old female patient presenting to the clinic today abnormal UA.\par \par # Chest X-ray done at [facility], results normal\par # Covid Test done at [facility]- results negative \par # Strep Throat test done at [facility]- results negative \par # No fever\par # pain in the left abdominal side \par # UA done in the office 05/14\par - shows blood and leukocytes in the urine

## 2021-05-19 NOTE — PLAN
[FreeTextEntry1] : #order UA \par # order CT Renal Stone \par # advise to increase water inkake\par # advise to go to the ED if pain worsens

## 2021-05-24 ENCOUNTER — APPOINTMENT (OUTPATIENT)
Dept: FAMILY MEDICINE | Facility: CLINIC | Age: 51
End: 2021-05-24

## 2021-05-26 ENCOUNTER — OUTPATIENT (OUTPATIENT)
Dept: OUTPATIENT SERVICES | Facility: HOSPITAL | Age: 51
LOS: 1 days | End: 2021-05-26
Payer: COMMERCIAL

## 2021-05-26 ENCOUNTER — APPOINTMENT (OUTPATIENT)
Dept: CT IMAGING | Facility: CLINIC | Age: 51
End: 2021-05-26
Payer: COMMERCIAL

## 2021-05-26 DIAGNOSIS — Z00.8 ENCOUNTER FOR OTHER GENERAL EXAMINATION: ICD-10-CM

## 2021-05-26 DIAGNOSIS — R31.29 OTHER MICROSCOPIC HEMATURIA: ICD-10-CM

## 2021-05-26 PROCEDURE — 74176 CT ABD & PELVIS W/O CONTRAST: CPT | Mod: 26

## 2021-05-26 PROCEDURE — 74176 CT ABD & PELVIS W/O CONTRAST: CPT

## 2021-06-02 ENCOUNTER — NON-APPOINTMENT (OUTPATIENT)
Age: 51
End: 2021-06-02

## 2021-06-02 VITALS — HEIGHT: 64 IN | BODY MASS INDEX: 39.09 KG/M2 | WEIGHT: 229 LBS

## 2021-06-03 ENCOUNTER — LABORATORY RESULT (OUTPATIENT)
Age: 51
End: 2021-06-03

## 2021-06-03 ENCOUNTER — APPOINTMENT (OUTPATIENT)
Dept: FAMILY MEDICINE | Facility: CLINIC | Age: 51
End: 2021-06-03
Payer: COMMERCIAL

## 2021-06-03 VITALS
DIASTOLIC BLOOD PRESSURE: 80 MMHG | SYSTOLIC BLOOD PRESSURE: 140 MMHG | BODY MASS INDEX: 39.44 KG/M2 | HEART RATE: 103 BPM | OXYGEN SATURATION: 98 % | WEIGHT: 231 LBS | RESPIRATION RATE: 16 BRPM | HEIGHT: 64 IN

## 2021-06-03 LAB
BILIRUB UR QL STRIP: NEGATIVE
CLARITY UR: CLEAR
GLUCOSE UR-MCNC: NEGATIVE
HCG UR QL: 0.2 EU/DL
HGB UR QL STRIP.AUTO: ABNORMAL
KETONES UR-MCNC: NEGATIVE
LEUKOCYTE ESTERASE UR QL STRIP: NEGATIVE
NITRITE UR QL STRIP: NEGATIVE
PH UR STRIP: 5.5
PROT UR STRIP-MCNC: NEGATIVE
SP GR UR STRIP: 1.03

## 2021-06-03 PROCEDURE — 99214 OFFICE O/P EST MOD 30 MIN: CPT | Mod: 25

## 2021-06-03 PROCEDURE — 99072 ADDL SUPL MATRL&STAF TM PHE: CPT

## 2021-06-03 PROCEDURE — 81003 URINALYSIS AUTO W/O SCOPE: CPT | Mod: QW

## 2021-06-07 ENCOUNTER — APPOINTMENT (OUTPATIENT)
Dept: FAMILY MEDICINE | Facility: CLINIC | Age: 51
End: 2021-06-07

## 2021-06-09 NOTE — HISTORY OF PRESENT ILLNESS
[FreeTextEntry8] : 52 y/o F presents today c/o pain on the lower left side of the abdomen.  \par She states she experiences the pain only when she coughs or sneezes.\par \par Was seen May 14 after abnormal urine at  for URI and UA was + for RBC ad WBC and protein. \par Was sent by Dr. Sylvester for CT Renal Hunt ; REVIEWED by me today for NORMAL.\par \par Is concerned today for persistent right sided abdominal pain although along lateral side of body by ribs.\par "Is getting better" but "should I be worried?"\par \par Patient has received the COVID-19 vaccine.

## 2021-06-09 NOTE — END OF VISIT
[FreeTextEntry3] : Medical record entries made by the scribe today, were at my direction and personally dictated to them by me, Dr. Mary Page on 06/03/2021. I have reviewed the chart and agree that the record accurately reflects my personal performance of the history, physical exam, assessment and plan.

## 2021-06-09 NOTE — ADDENDUM
[FreeTextEntry1] : I, Melissa Wolfe acting as a scribe for Dr. Mary Page MD on 06/03/2021 at 3:11 PM.

## 2021-06-09 NOTE — PLAN
[FreeTextEntry1] : Acute visit for 51 year old F with PMH as stated in HPI / active list. \par \par Management : \par \par Advised likely muscle strain from coughing and reassured . Topical analgesics as needed and Tylenol \par \par Labs/ Urinalysis done  in office today.\par POCT  UA  + blood moderate  negative protein \par Will send for culture and cytology.\par \par Best wishes offered !

## 2021-06-10 ENCOUNTER — TRANSCRIPTION ENCOUNTER (OUTPATIENT)
Age: 51
End: 2021-06-10

## 2021-06-10 LAB
APPEARANCE: ABNORMAL
BILIRUBIN URINE: NEGATIVE
BLOOD URINE: NORMAL
COLOR: YELLOW
GLUCOSE QUALITATIVE U: NEGATIVE
KETONES URINE: NEGATIVE
LEUKOCYTE ESTERASE URINE: ABNORMAL
NITRITE URINE: NEGATIVE
PH URINE: 5.5
PROTEIN URINE: NORMAL
SPECIFIC GRAVITY URINE: 1.02
URINE CYTOLOGY: NORMAL
UROBILINOGEN URINE: NORMAL

## 2021-07-01 ENCOUNTER — RX RENEWAL (OUTPATIENT)
Age: 51
End: 2021-07-01

## 2021-07-12 ENCOUNTER — APPOINTMENT (OUTPATIENT)
Dept: FAMILY MEDICINE | Facility: CLINIC | Age: 51
End: 2021-07-12
Payer: COMMERCIAL

## 2021-07-12 VITALS
BODY MASS INDEX: 39.78 KG/M2 | SYSTOLIC BLOOD PRESSURE: 132 MMHG | HEIGHT: 64 IN | RESPIRATION RATE: 16 BRPM | OXYGEN SATURATION: 98 % | HEART RATE: 105 BPM | WEIGHT: 233 LBS | DIASTOLIC BLOOD PRESSURE: 85 MMHG

## 2021-07-12 VITALS — SYSTOLIC BLOOD PRESSURE: 126 MMHG | DIASTOLIC BLOOD PRESSURE: 84 MMHG

## 2021-07-12 DIAGNOSIS — Z87.39 PERSONAL HISTORY OF OTHER DISEASES OF THE MUSCULOSKELETAL SYSTEM AND CONNECTIVE TISSUE: ICD-10-CM

## 2021-07-12 DIAGNOSIS — Z87.09 PERSONAL HISTORY OF OTHER DISEASES OF THE RESPIRATORY SYSTEM: ICD-10-CM

## 2021-07-12 LAB
BILIRUB UR QL STRIP: NEGATIVE
CLARITY UR: CLEAR
COLLECTION METHOD: NORMAL
GLUCOSE UR-MCNC: NEGATIVE
HCG UR QL: 0.2 EU/DL
HGB UR QL STRIP.AUTO: ABNORMAL
KETONES UR-MCNC: ABNORMAL
LEUKOCYTE ESTERASE UR QL STRIP: NEGATIVE
NITRITE UR QL STRIP: NEGATIVE
PH UR STRIP: 5.5
PROT UR STRIP-MCNC: 30
SP GR UR STRIP: 1.03

## 2021-07-12 PROCEDURE — 99214 OFFICE O/P EST MOD 30 MIN: CPT | Mod: 25

## 2021-07-12 PROCEDURE — 81003 URINALYSIS AUTO W/O SCOPE: CPT | Mod: QW

## 2021-07-12 PROCEDURE — 99072 ADDL SUPL MATRL&STAF TM PHE: CPT

## 2021-07-12 RX ORDER — FLUTICASONE PROPIONATE 50 UG/1
50 SPRAY, METERED NASAL DAILY
Qty: 1 | Refills: 3 | Status: DISCONTINUED | COMMUNITY
Start: 2019-11-11 | End: 2021-07-12

## 2021-07-12 RX ORDER — ZOSTER VACCINE RECOMBINANT, ADJUVANTED 50 MCG/0.5
50 KIT INTRAMUSCULAR
Qty: 1 | Refills: 1 | Status: DISCONTINUED | COMMUNITY
Start: 2019-02-04 | End: 2021-07-12

## 2021-07-12 RX ORDER — AZITHROMYCIN 250 MG/1
250 TABLET, FILM COATED ORAL
Qty: 1 | Refills: 0 | Status: DISCONTINUED | COMMUNITY
Start: 2021-01-08 | End: 2021-07-12

## 2021-07-17 PROBLEM — Z87.39 HISTORY OF STRAIN: Status: RESOLVED | Noted: 2021-06-09 | Resolved: 2021-07-17

## 2021-07-17 PROBLEM — Z87.09 HISTORY OF SORE THROAT: Status: RESOLVED | Noted: 2021-05-14 | Resolved: 2021-07-17

## 2021-07-17 NOTE — ADDENDUM
[FreeTextEntry1] : I, Gucci Nolan acting as a scribe for Dr. Mary Page MD on 07/12/2021 at 10:13 AM.\par

## 2021-07-17 NOTE — HISTORY OF PRESENT ILLNESS
[FreeTextEntry1] : F/U\par Last seen in office 6/3/21 for acute, encounter reviewed.\par \par BAck pain resolved; microscopic hematuria not evident on sample in office.  [de-identified] : KORY VILLAGRAN is a 51 year old female who presents for follow-up evaluation. \par Claims that her stomach disturbances are improved. \par  Had blood work done at [facility]. She does not exercise. Follows with opthalmology annually (Dr. Johns).\par \par Cholesterol well controlled, LDL at goal. * 81) \par A1c 7.2  Does follow with ENDO for NUHA\par \par Presetns autopsy report of mothers sudden death ( aged ) ;  essentially bedridden;\par PE  and DVT noted.  Breast cancer ( known; untreated) \par \par Discussed and educated; much support rendered. \par \par \par \par

## 2021-07-17 NOTE — PLAN
[FreeTextEntry1] : F/U for 51 year old F with PMH as stated in HPI / active list. \par \par Management : \par \par See HPI and Plan\par \par Encouraged patient to incorporate exercising into daily living. Walk for 30 minutes daily.\par Reviewed blood work with patient in office today.\par -Urine sample\par \par Best wishes offered!\par

## 2021-07-17 NOTE — END OF VISIT
[FreeTextEntry3] : Medical record entries made by the scribe today, were at my direction and personally dictated to them by me, Dr. Mary Page on 07/12/2021. I have reviewed the chart and agree that the record accurately reflects my personal performance of the history, physical exam, assessment and plan.\par

## 2021-08-04 ENCOUNTER — NON-APPOINTMENT (OUTPATIENT)
Age: 51
End: 2021-08-04

## 2021-08-04 VITALS — HEIGHT: 64 IN | WEIGHT: 229 LBS | BODY MASS INDEX: 39.09 KG/M2

## 2021-09-20 ENCOUNTER — RX RENEWAL (OUTPATIENT)
Age: 51
End: 2021-09-20

## 2021-10-11 ENCOUNTER — APPOINTMENT (OUTPATIENT)
Dept: FAMILY MEDICINE | Facility: CLINIC | Age: 51
End: 2021-10-11
Payer: COMMERCIAL

## 2021-10-11 VITALS
HEIGHT: 64 IN | WEIGHT: 232 LBS | TEMPERATURE: 97.8 F | DIASTOLIC BLOOD PRESSURE: 80 MMHG | SYSTOLIC BLOOD PRESSURE: 120 MMHG | BODY MASS INDEX: 39.61 KG/M2 | HEART RATE: 100 BPM

## 2021-10-11 VITALS — DIASTOLIC BLOOD PRESSURE: 78 MMHG | SYSTOLIC BLOOD PRESSURE: 118 MMHG

## 2021-10-11 VITALS — HEART RATE: 87 BPM

## 2021-10-11 PROCEDURE — 90686 IIV4 VACC NO PRSV 0.5 ML IM: CPT

## 2021-10-11 PROCEDURE — G0444 DEPRESSION SCREEN ANNUAL: CPT | Mod: 59

## 2021-10-11 PROCEDURE — G0008: CPT

## 2021-10-11 PROCEDURE — 99214 OFFICE O/P EST MOD 30 MIN: CPT | Mod: 25

## 2021-10-16 NOTE — ADDENDUM
[FreeTextEntry1] : Medical record entries made by the scribe today, were at my direction and personally dictated to them by me, Dr. Mary Page on 10/11/2021.  I have reviewed the chart and agree that the record accurately reflects my personal performance of the history, physical exam, assessment and plan.\par \par Juan Alberto QUISPE acting as scribe for Dr. Mary Page MD on 10/11/2021 at 9:18 AM.\par

## 2021-10-16 NOTE — HEALTH RISK ASSESSMENT
[0] : 1) Little interest or pleasure doing things: Not at all (0) [1] : 2) Feeling down, depressed, or hopeless for several days (1) [PHQ-2 Negative - No further assessment needed] : PHQ-2 Negative - No further assessment needed [[id number]] : 1

## 2021-10-16 NOTE — COUNSELING
[Benefits of weight loss discussed] : Benefits of weight loss discussed [Encouraged to increase physical activity] : Encouraged to increase physical activity [FreeTextEntry2] : advised meal replacement with Premier Protein Shake daily and drink 60-80 ounces water daily.\par

## 2021-10-16 NOTE — ASSESSMENT
[FreeTextEntry1] : Pt advised to alter metformin intake more evenly throughout the day from 4 pills in evening ( as per endocrinology)  to 2 in morning and 2 in evening.\par \par Pt advised to walk more consistently for added activity, encouraged to reach 10,000 daily steps.\par \par Advised to attempt weight loss through improved eating habits by avoiding fast food/processed foods/junk food.\par Increase vegetable and salads, proteins.  Work out routine for coming winter months provided and encouraged. \par \par advised meal replacement with Premier Protein Shake daily and drink 60-80 ounces water daily.\par \par Labs in office today and will advise.  \par \par \par 
1.75

## 2021-10-16 NOTE — HISTORY OF PRESENT ILLNESS
[de-identified] : KORY VILLAGRAN is a 51 year old F who presents today for a follow up for DM management.  Pt states she has no new complaints. Otherwise, patient has been well and denies an recent ER visits/hospitalizations/MVA's or MSK injuries. \par Pt is working from home ([employer]) until at least JAN 2022.  Pt states she walks to keep active.  \par \par   Pt chopped down a tree on her property this past weekend.  \par \par Compliant with medications. \par \par \par \par Pt explains troubles caused to her by her alcoholic brother who she lives with and provides certain care for.  Recently obtained his birth certificate.  \par \par In review:\par KORY VILLAGRAN is a 51 year old female who presents for follow-up evaluation. \par Claims that her stomach disturbances are improved. \par  Had blood work done at [facility]. She does not exercise. Follows with opthalmology annually (Dr. Johns).\par \par Cholesterol well controlled, LDL at goal. * 81) \par A1c 7.2  Does follow with ENDO for NUHA\par \par Presents autopsy report of mothers sudden death ( aged ) ;  essentially bedridden;\par PE  and DVT noted.  Breast cancer ( known; untreated) \par \par Discussed and educated; much support rendered. \par \par \par \par  [FreeTextEntry1] : F/U\par Last seen in office 7/12//21 for the same, encounter reviewed.\par Last FU with RD noted as she will no longer be covered by insurance. \par Dr. Alberts: [phone]  [place] \par Endocrinology : she would like to stop FU as she doesn't appreciate benefit; "glucose sensor never really works well"\par \par BAck pain resolved; microscopic hematuria not evident on sample in office.

## 2021-10-16 NOTE — PLAN
[FreeTextEntry1] : Flu shot administered, due for blood work but did not fast today.  \par \par \par In review:\par F/U for 51 year old F with PMH as stated in HPI / active list. \par \par Management : \par \par See HPI and Plan\par \par Encouraged patient to incorporate exercising into daily living. Walk for 30 minutes daily.\par Reviewed blood work with patient in office today.\par -Urine sample\par \par Best wishes offered!\par

## 2021-10-28 ENCOUNTER — RX RENEWAL (OUTPATIENT)
Age: 51
End: 2021-10-28

## 2021-10-29 DIAGNOSIS — R92.8 OTHER ABNORMAL AND INCONCLUSIVE FINDINGS ON DIAGNOSTIC IMAGING OF BREAST: ICD-10-CM

## 2021-12-17 ENCOUNTER — RX RENEWAL (OUTPATIENT)
Age: 51
End: 2021-12-17

## 2022-01-24 ENCOUNTER — APPOINTMENT (OUTPATIENT)
Dept: FAMILY MEDICINE | Facility: CLINIC | Age: 52
End: 2022-01-24
Payer: COMMERCIAL

## 2022-01-24 ENCOUNTER — NON-APPOINTMENT (OUTPATIENT)
Age: 52
End: 2022-01-24

## 2022-01-24 VITALS
HEIGHT: 64 IN | DIASTOLIC BLOOD PRESSURE: 94 MMHG | OXYGEN SATURATION: 98 % | WEIGHT: 230 LBS | HEART RATE: 110 BPM | SYSTOLIC BLOOD PRESSURE: 150 MMHG | BODY MASS INDEX: 39.27 KG/M2

## 2022-01-24 PROCEDURE — 99213 OFFICE O/P EST LOW 20 MIN: CPT | Mod: 25

## 2022-01-24 PROCEDURE — 99396 PREV VISIT EST AGE 40-64: CPT | Mod: 25

## 2022-01-29 NOTE — HISTORY OF PRESENT ILLNESS
[FreeTextEntry1] : CPE\par Last CPE was 12/20 and encounter reviewed. \par Since has been seen in multiple FU with Diabetes educator as well as FUV with me  for chronic medical conditions of Diabetes/HLD and depression.  All relatively stable.\par \par KORY VILLAGRAN is a 52 year old F who presents today for a CPE.  Pt no longer follows with Endocrinology.  Pt states that last night while laying down she felt her heart "race" for some time, states that she believes it is due to anxiety.  Otherwise, patient has been well and denies any recent ER visits/hospitalizations/MVA's or MSK injuries. \par \par Pt states that she continues to walk but "not as much as she should be" states that she walk about 3x a week.\par \par With regard to PSYCH, continues to follow, has an appointment tomorrow via Zoom.  \par \par Pt states that she has been stressed by a bathroom remodeling occurring in her house.  Notes continued struggles with her brother.   [de-identified] : IN REVIEW 12/20\par CPE \par Last CPE 2/19 and reviewed. \par Multiple encounters with Diabetes Education and Management. for overall stability.\par \par \par \par continues therapy with AKASH Buckner  in [place].  [phone]\par \par Overall she feels well and \par Denies any recent ER visits/hospitalizations/ MVA's or MSK injuries.\par \par Adjusting since her mothers death and doing some work on her home.

## 2022-01-29 NOTE — ADDENDUM
[FreeTextEntry1] : Medical record entries made by the scribe today, were at my direction and personally dictated to them by me, Dr. Mary Page on 01/24/2022.  I have reviewed the chart and agree that the record accurately reflects my personal performance of the history, physical exam, assessment and plan.\par \par Juan Alberto QUISPE acting as scribe for Dr. Mary Page MD on 01/24/2022 at 3:55 PM.\par

## 2022-01-29 NOTE — REVIEW OF SYSTEMS
[Negative] : Neurological [Anxiety] : anxiety [Fever] : no fever [Chills] : no chills [FreeTextEntry3] : Opthalmology 10/20 TASH MARTIN

## 2022-01-29 NOTE — COUNSELING
[de-identified] : AGAIN the value of daily walk, 30 minutes 5 x's per week,  stressed with regard  to overall health, mental health and bone density\par

## 2022-01-29 NOTE — ASSESSMENT
[FreeTextEntry1] : CPE for 52 year old WF with PMH as stated in HPI / active list. \par \par Management : \par \par See HPI and Plan.\par \par Labs to be completed fasted at a later date, will advise.\par \par A1C to be evaluated, treatment plan to follow, will advise.    +/- endo referral \par \par To follow with Opthalmology for annual DM eye exam in October.  \par \par Best wishes offered!

## 2022-01-29 NOTE — HEALTH RISK ASSESSMENT
[Very Good] : ~his/her~  mood as very good [No falls in past year] : Patient reported no falls in the past year [0] : 1) Little interest or pleasure doing things: Not at all (0) [1] : 2) Feeling down, depressed, or hopeless for several days (1) [Patient reported mammogram was normal] : Patient reported mammogram was normal [Patient reported bone density results were normal] : Patient reported bone density results were normal [Patient reported colonoscopy was normal] : Patient reported colonoscopy was normal [None] : None [# of Members in Household ___] :  household currently consist of [unfilled] member(s) [Employed] : employed [College] : College [Single] : single [Fully functional (using the telephone, shopping, preparing meals, housekeeping, doing laundry, using] : Fully functional and needs no help or supervision to perform IADLs (using the telephone, shopping, preparing meals, housekeeping, doing laundry, using transportation, managing medications and managing finances) [Smoke Detector] : smoke detector [Carbon Monoxide Detector] : carbon monoxide detector [Seat Belt] :  uses seat belt [FreeTextEntry1] : WEIGHT  [de-identified] : DENIES [de-identified] : Endocrinology    PSYcologist as noted n HPI  [de-identified] : RARE  [de-identified] :  enjoys walking her dog  [de-identified] : eating too much bread....  [[id number]] : 1 [Change in mental status noted] : No change in mental status noted [Guns at Home] : no guns at home [Travel to Developing Areas] : does not  travel to developing areas [MammogramDate] : 10/21 [BoneDensityDate] : 11/20  [ColonoscopyDate] : 11/20  [ColonoscopyComments] : Dr. 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

## 2022-01-29 NOTE — PHYSICAL EXAM
[Normal Sclera/Conjunctiva] : normal sclera/conjunctiva [PERRL] : pupils equal round and reactive to light [Normal Oropharynx] : the oropharynx was normal [Normal TMs] : both tympanic membranes were normal [Thyroid Normal, No Nodules] : the thyroid was normal and there were no nodules present [No Respiratory Distress] : no respiratory distress  [No Accessory Muscle Use] : no accessory muscle use [Clear to Auscultation] : lungs were clear to auscultation bilaterally [Normal Rate] : normal rate  [Regular Rhythm] : with a regular rhythm [No Murmur] : no murmur heard [No Edema] : there was no peripheral edema [Soft] : abdomen soft [Non Tender] : non-tender [No Masses] : no abdominal mass palpated [Normal Posterior Cervical Nodes] : no posterior cervical lymphadenopathy [Normal Anterior Cervical Nodes] : no anterior cervical lymphadenopathy [No Spinal Tenderness] : no spinal tenderness [No Joint Swelling] : no joint swelling [Grossly Normal Strength/Tone] : grossly normal strength/tone [No Focal Deficits] : no focal deficits [Normal Gait] : normal gait [Alert and Oriented x3] : oriented to person, place, and time [Normal Insight/Judgement] : insight and judgment were intact [Deep Tendon Reflexes (DTR)] : deep tendon reflexes were 2+ and symmetric [Speech Grossly Normal] : speech grossly normal [de-identified] : smiling and engaging  [de-identified] : OBESE  [de-identified] : No tremors  [de-identified] : Negative pronator drift

## 2022-03-08 ENCOUNTER — RX RENEWAL (OUTPATIENT)
Age: 52
End: 2022-03-08

## 2022-04-05 ENCOUNTER — TRANSCRIPTION ENCOUNTER (OUTPATIENT)
Age: 52
End: 2022-04-05

## 2022-04-28 ENCOUNTER — APPOINTMENT (OUTPATIENT)
Dept: FAMILY MEDICINE | Facility: CLINIC | Age: 52
End: 2022-04-28
Payer: COMMERCIAL

## 2022-04-28 VITALS
DIASTOLIC BLOOD PRESSURE: 96 MMHG | WEIGHT: 230 LBS | HEART RATE: 100 BPM | HEIGHT: 64 IN | SYSTOLIC BLOOD PRESSURE: 144 MMHG | BODY MASS INDEX: 39.27 KG/M2 | OXYGEN SATURATION: 97 %

## 2022-04-28 PROCEDURE — 99214 OFFICE O/P EST MOD 30 MIN: CPT | Mod: 25

## 2022-04-28 PROCEDURE — 36415 COLL VENOUS BLD VENIPUNCTURE: CPT

## 2022-05-02 LAB
ALBUMIN SERPL ELPH-MCNC: 4.8 G/DL
ALP BLD-CCNC: 100 U/L
ALT SERPL-CCNC: 36 U/L
ANION GAP SERPL CALC-SCNC: 14 MMOL/L
AST SERPL-CCNC: 23 U/L
BASOPHILS # BLD AUTO: 0.05 K/UL
BASOPHILS NFR BLD AUTO: 0.5 %
BILIRUB SERPL-MCNC: 0.2 MG/DL
BUN SERPL-MCNC: 8 MG/DL
CALCIUM SERPL-MCNC: 9.7 MG/DL
CHLORIDE SERPL-SCNC: 97 MMOL/L
CHOLEST SERPL-MCNC: 157 MG/DL
CO2 SERPL-SCNC: 27 MMOL/L
CREAT SERPL-MCNC: 0.76 MG/DL
CREAT SPEC-SCNC: 166 MG/DL
EGFR: 93 ML/MIN/1.73M2
EOSINOPHIL # BLD AUTO: 0.13 K/UL
EOSINOPHIL NFR BLD AUTO: 1.2 %
ESTIMATED AVERAGE GLUCOSE: 197 MG/DL
GLUCOSE SERPL-MCNC: 123 MG/DL
HBA1C MFR BLD HPLC: 8.5 %
HCT VFR BLD CALC: 41.5 %
HDLC SERPL-MCNC: 48 MG/DL
HGB BLD-MCNC: 12.9 G/DL
IMM GRANULOCYTES NFR BLD AUTO: 0.7 %
LDLC SERPL CALC-MCNC: 90 MG/DL
LYMPHOCYTES # BLD AUTO: 3.09 K/UL
LYMPHOCYTES NFR BLD AUTO: 29 %
MAN DIFF?: NORMAL
MCHC RBC-ENTMCNC: 28.3 PG
MCHC RBC-ENTMCNC: 31.1 GM/DL
MCV RBC AUTO: 91 FL
MICROALBUMIN 24H UR DL<=1MG/L-MCNC: 1.9 MG/DL
MICROALBUMIN/CREAT 24H UR-RTO: 12 MG/G
MONOCYTES # BLD AUTO: 0.51 K/UL
MONOCYTES NFR BLD AUTO: 4.8 %
NEUTROPHILS # BLD AUTO: 6.8 K/UL
NEUTROPHILS NFR BLD AUTO: 63.8 %
NONHDLC SERPL-MCNC: 109 MG/DL
PLATELET # BLD AUTO: 294 K/UL
POTASSIUM SERPL-SCNC: 4.7 MMOL/L
PROT SERPL-MCNC: 7.3 G/DL
RBC # BLD: 4.56 M/UL
RBC # FLD: 13.2 %
SODIUM SERPL-SCNC: 139 MMOL/L
TRIGL SERPL-MCNC: 94 MG/DL
TSH SERPL-ACNC: 0.8 UIU/ML
WBC # FLD AUTO: 10.65 K/UL

## 2022-05-02 NOTE — ADDENDUM
[FreeTextEntry1] : Medical record entries made by the scribe today, were at my direction and personally dictated to them by me, Dr. Mary Page on 04/28/2022.  I have reviewed the chart and agree that the record accurately reflects my personal performance of the history, physical exam, assessment and plan.\par \par Juan Alberto QUISPE acting as scribe for Dr. Mary Page MD on 04/28/2022 at 10:04 AM.\par

## 2022-05-02 NOTE — ASSESSMENT
[FreeTextEntry1] : FUV for 52 year old WF with PMH as stated in HPI / active list. \par \par Management : \par \par See HPI and Plan.\par \par Labs in office today, will advise.\par \par Due for colonoscopy in 2023.  \par \par Advised to consider COVID-19 booster.  \par \par Importance of daily 30 minute walk stressed for exercise. \par \par Refill(s) provided for:\par Atorvastatin\par Trulicity\par Metformin\par Ibuprofen\par \par Best wishes offered!

## 2022-05-02 NOTE — HISTORY OF PRESENT ILLNESS
[FreeTextEntry1] : Last seen in office January 2022 for CPE\par \par Presents for 3 month FUV on chronic medical conditions of: DM, dyslipidemia, depression\par \par Requesting medication refills of: metformin, atorvastatin, Trulicity, ibuprofen [de-identified] : In recent weeks BENEDICT endorses:\par \par Pt has no complaints and is well.\par \par Speaks with therapist once a month, medication is managed by practitioner.  \par \par Not following with DM specialist, did not go for blood work.  Pt states she is home all the time as she now works from home and cooks for herself, does not cook healthy meals as stated by herself.  Pt states she does not engage in a daily walk "sometimes I do."\par \par Pt received the COVID-19 vaccine 5-6 months after silvestre COVID-19, did not receive any booster shots.  \par

## 2022-05-02 NOTE — REVIEW OF SYSTEMS
[Fever] : no fever [Chills] : no chills [Fatigue] : no fatigue [Hot Flashes] : no hot flashes [Night Sweats] : no night sweats [Recent Change In Weight] : ~T no recent weight change

## 2022-06-02 ENCOUNTER — NON-APPOINTMENT (OUTPATIENT)
Age: 52
End: 2022-06-02

## 2022-06-06 ENCOUNTER — APPOINTMENT (OUTPATIENT)
Dept: FAMILY MEDICINE | Facility: CLINIC | Age: 52
End: 2022-06-06
Payer: COMMERCIAL

## 2022-06-06 VITALS
WEIGHT: 230 LBS | HEART RATE: 77 BPM | HEIGHT: 64 IN | OXYGEN SATURATION: 96 % | BODY MASS INDEX: 39.27 KG/M2 | SYSTOLIC BLOOD PRESSURE: 134 MMHG | DIASTOLIC BLOOD PRESSURE: 82 MMHG

## 2022-06-06 DIAGNOSIS — R31.29 OTHER MICROSCOPIC HEMATURIA: ICD-10-CM

## 2022-06-06 PROCEDURE — 99214 OFFICE O/P EST MOD 30 MIN: CPT | Mod: 25

## 2022-06-06 PROCEDURE — 96372 THER/PROPH/DIAG INJ SC/IM: CPT

## 2022-06-06 RX ORDER — METHYLPRED ACET/NACL,ISO-OS/PF 40 MG/ML
40 VIAL (ML) INJECTION
Qty: 1 | Refills: 0 | Status: COMPLETED | OUTPATIENT
Start: 2022-06-06

## 2022-06-06 RX ADMIN — METHYLPREDNISOLONE ACETATE 1 MG/ML: 40 INJECTION, SUSPENSION INTRA-ARTICULAR; INTRALESIONAL; INTRAMUSCULAR; INTRASYNOVIAL; SOFT TISSUE at 00:00

## 2022-06-09 NOTE — ASSESSMENT
Large Joint Aspiration/Injection: bilateral knee    Date/Time: 6/9/2022 1:15 PM  Performed by: Estevan Monteiro MD  Authorized by: Estevan Monteiro MD     Consent Done?:  Yes (Verbal)  Indications:  Pain  Site marked: the procedure site was marked    Timeout: prior to procedure the correct patient, procedure, and site was verified      Details:  Needle Size:  20 G  Approach:  Anterolateral  Location:  Knee  Laterality:  Bilateral  Site:  Bilateral knee  Medications (Right):  20 mg sodium hyaluronate (EUFLEXXA) 10 mg/mL(mw 2.4 -3.6 million)  Medications (Left):  20 mg sodium hyaluronate (EUFLEXXA) 10 mg/mL(mw 2.4 -3.6 million)  Patient tolerance:  Patient tolerated the procedure well with no immediate complications       [FreeTextEntry1] : as in HPI and PLAN \par \par LAbs in office today and will advise. \par \par Much support rendered!

## 2022-06-15 NOTE — PHYSICAL EXAM
[No Acute Distress] : no acute distress [Normal Sclera/Conjunctiva] : normal sclera/conjunctiva [Speech Grossly Normal] : speech grossly normal [Alert and Oriented x3] : oriented to person, place, and time [de-identified] : a bit heightened [de-identified] : TARYN  [de-identified] : candidasis under breasts  [de-identified] : scattered raised flat erythematous lesions  RANDOm

## 2022-06-15 NOTE — ASSESSMENT
[FreeTextEntry1] : Dermatitis NOS with considerable pruritus \par IM [facility] MEDROL and   PO course  for 5 days \par Take with food\par Sugar will be elevated\par \par \par Candidiasis  topical as well as  advised off loading and measures to achieve discussed.

## 2022-06-15 NOTE — HISTORY OF PRESENT ILLNESS
[FreeTextEntry8] : Saumya presents today for evaluation of itching from top of her forehead down right side of face and ear and as well as arm and trunk\par Also has rash under breast and in groins'\par \par Started about 10 day sago and was worsening ; "Live on Benadryl"; \par went to UC 3 days ago and prescribed nystatin powder "not helping"\par and Ketoconazole cream. \par

## 2022-06-15 NOTE — REVIEW OF SYSTEMS
[Fatigue] : fatigue [Anxiety] : anxiety [Negative] : Neurological [Fever] : no fever [FreeTextEntry2] : due to itchng

## 2022-06-20 ENCOUNTER — TRANSCRIPTION ENCOUNTER (OUTPATIENT)
Age: 52
End: 2022-06-20

## 2022-07-18 ENCOUNTER — APPOINTMENT (OUTPATIENT)
Dept: FAMILY MEDICINE | Facility: CLINIC | Age: 52
End: 2022-07-18

## 2022-07-18 VITALS
SYSTOLIC BLOOD PRESSURE: 134 MMHG | HEART RATE: 93 BPM | HEIGHT: 64 IN | WEIGHT: 228 LBS | DIASTOLIC BLOOD PRESSURE: 86 MMHG | BODY MASS INDEX: 38.93 KG/M2 | OXYGEN SATURATION: 98 %

## 2022-07-18 PROCEDURE — 36415 COLL VENOUS BLD VENIPUNCTURE: CPT

## 2022-07-18 PROCEDURE — 99214 OFFICE O/P EST MOD 30 MIN: CPT | Mod: 25

## 2022-07-18 RX ORDER — NYSTATIN 100000 1/G
100000 POWDER TOPICAL
Qty: 15 | Refills: 0 | Status: COMPLETED | COMMUNITY
Start: 2022-06-03

## 2022-07-18 RX ORDER — PREDNISONE 20 MG/1
20 TABLET ORAL DAILY
Qty: 10 | Refills: 0 | Status: DISCONTINUED | COMMUNITY
Start: 2022-06-06 | End: 2022-07-18

## 2022-07-18 RX ORDER — KETOCONAZOLE 20 MG/G
2 CREAM TOPICAL
Qty: 15 | Refills: 0 | Status: COMPLETED | COMMUNITY
Start: 2022-06-03

## 2022-07-18 RX ORDER — DULAGLUTIDE 1.5 MG/.5ML
1.5 INJECTION, SOLUTION SUBCUTANEOUS
Qty: 1 | Refills: 1 | Status: DISCONTINUED | COMMUNITY
Start: 2020-09-23 | End: 2022-07-18

## 2022-07-23 NOTE — ADDENDUM
[FreeTextEntry1] : Medical record entries made by the scribe today, were at my direction and personally dictated to them by me, Dr. Mary Page on 07/18/2022.  I have reviewed the chart and agree that the record accurately reflects my personal performance of the history, physical exam, assessment and plan.\par \par Juan Alberto QUISPE acting as scribe for Dr. Mary Page MD on 07/18/2022 at 10:42 AM.\par

## 2022-07-23 NOTE — HISTORY OF PRESENT ILLNESS
[FreeTextEntry1] : Last seen in office June 2022 for acute dermitis NOS, encounter reviewed. \par RESOLVED with steroids. \par  [de-identified] : In recent weeks SAUMYA endorses:\par \par Pt states she takes Trulicity weekly, not currently on insulin pump.  States Trulicity prescribed by other provider, not  in system.\par \par From April 2022, A 1 C 8.5%, increase from 7.2% in October 2021.  \par \par Saumya really is not doing the work of her medical condition likely due to poor motivations and   moderate depression.SHe does work FT effectively and cares for her home and assists neighbors in projects as well.\par \par Complaint with medications .

## 2022-07-23 NOTE — ASSESSMENT
[FreeTextEntry1] : FUV for 52 year old WF with PMH as stated in HPI / active list. \par \par Management : \par \par See HPI and Plan.\par \par CBC with A 1 C in office today, will advise. \par \par Trulicity regimen logged, advise compliance. \par \par Best wishes offered!

## 2022-07-28 ENCOUNTER — TRANSCRIPTION ENCOUNTER (OUTPATIENT)
Age: 52
End: 2022-07-28

## 2022-08-08 LAB
BASOPHILS # BLD AUTO: 0.05 K/UL
BASOPHILS NFR BLD AUTO: 0.5 %
EOSINOPHIL # BLD AUTO: 0.11 K/UL
EOSINOPHIL NFR BLD AUTO: 1.1 %
ESTIMATED AVERAGE GLUCOSE: 189 MG/DL
HBA1C MFR BLD HPLC: 8.2 %
HCT VFR BLD CALC: 42.4 %
HGB BLD-MCNC: 13.1 G/DL
IMM GRANULOCYTES NFR BLD AUTO: 0.6 %
LYMPHOCYTES # BLD AUTO: 2.84 K/UL
LYMPHOCYTES NFR BLD AUTO: 27.8 %
MAN DIFF?: NORMAL
MCHC RBC-ENTMCNC: 28.8 PG
MCHC RBC-ENTMCNC: 30.9 GM/DL
MCV RBC AUTO: 93.2 FL
MONOCYTES # BLD AUTO: 0.55 K/UL
MONOCYTES NFR BLD AUTO: 5.4 %
NEUTROPHILS # BLD AUTO: 6.62 K/UL
NEUTROPHILS NFR BLD AUTO: 64.6 %
PLATELET # BLD AUTO: 308 K/UL
RBC # BLD: 4.55 M/UL
RBC # FLD: 13.6 %
WBC # FLD AUTO: 10.23 K/UL

## 2022-08-24 ENCOUNTER — RX RENEWAL (OUTPATIENT)
Age: 52
End: 2022-08-24

## 2022-10-20 ENCOUNTER — APPOINTMENT (OUTPATIENT)
Dept: FAMILY MEDICINE | Facility: CLINIC | Age: 52
End: 2022-10-20

## 2022-10-20 VITALS
DIASTOLIC BLOOD PRESSURE: 84 MMHG | HEIGHT: 64 IN | WEIGHT: 225 LBS | HEART RATE: 86 BPM | BODY MASS INDEX: 38.41 KG/M2 | OXYGEN SATURATION: 98 % | SYSTOLIC BLOOD PRESSURE: 132 MMHG

## 2022-10-20 DIAGNOSIS — Z23 ENCOUNTER FOR IMMUNIZATION: ICD-10-CM

## 2022-10-20 PROCEDURE — G0008: CPT

## 2022-10-20 PROCEDURE — 99214 OFFICE O/P EST MOD 30 MIN: CPT | Mod: 25

## 2022-10-20 PROCEDURE — 90686 IIV4 VACC NO PRSV 0.5 ML IM: CPT

## 2022-10-20 PROCEDURE — 36415 COLL VENOUS BLD VENIPUNCTURE: CPT

## 2022-10-26 NOTE — PLAN
[FreeTextEntry1] : In review July 2022:\par FUV for 52 year old WF with PMH as stated in HPI / active list. \par \par Management : \par \par See HPI and Plan.\par \par CBC with A 1 C in office today, will advise. \par \par Trulicity regimen logged, advise compliance. \par \par Best wishes offered! \par

## 2022-10-26 NOTE — HISTORY OF PRESENT ILLNESS
[FreeTextEntry1] : Last seen in office July 2022 for same, encounter reviewed. \par \par Presents for FUV on chronic medical conditions of: DM, dyslipidemia \par \par Recent consults reviewed and noted for:\par Opthal (DM exam, no evidence of clinically significant diabetic macular edema,     October 2022)\par \par In recent weeks BENEDICT endorses:\par  \par Pt states that she is walking "not as much as you want me to," states that she typically walks 5,000 steps a day "trying to get to 10,000, I know."  Pt states that she is walking around the block again, is restarting a regular regimen.  Has lost 5 lbs since June 2022.  States minor pains in toes in PM when laying down in bed, states that issue is resolved by the time that she wakes.  \par \par Regarding A 1 C "I want to get out of the 8 numbers."  Last A 1 C 8.2% from August 2022.  Presented to office wearing continuous glucose monitor "it keeps me honest," right arm.  \par \par Mammogram last completed in October 2021 with US in December 2021, Bi rads 2.   Pt states she is scheduled to consult GYN in coming weeks. \par  [de-identified] : \par In review July 2022:\par Last seen in office June 2022 for acute dermitis NOS, encounter reviewed. \par RESOLVED with steroids. \par \par In recent weeks SAUMYA endorses:\par \par Pt states she takes Trulicity weekly, not currently on insulin pump.  States Trulicity prescribed by other provider, not  in system.\par \par From April 2022, A 1 C 8.5%, increase from 7.2% in October 2021.  \par \par Saumya really is not doing the work of her medical condition likely due to poor motivations and   moderate depression.SHe does work FT effectively and cares for her home and assists neighbors in projects as well.\par \par Complaint with medications .

## 2022-10-26 NOTE — ASSESSMENT
[FreeTextEntry1] : FUV for 52 year old WF with PMH as stated in HPI / active list. \par \par Management : \par \par See HPI and Plan.\par \par Labs in office today.  Medications to be reconciled upon lab review, will advise.\par \par Pt opted for the influenza vaccine today. \par \par Mammogram script provided. \par \par Importance of daily 30 minute walk stressed for exercise. Importance of drinking 60-80 oz of FREE WATER daily stressed. Continue current medication regimens. Continue healthy lifestyle choices!\par \par Best wishes offered!

## 2022-10-26 NOTE — ADDENDUM
[FreeTextEntry1] : Medical record entries made by the scribe today, were at my direction and personally dictated to them by me, Dr. Mary Page on 10/20/2022.  I have reviewed the chart and agree that the record accurately reflects my personal performance of the history, physical exam, assessment and plan.\par \par Juan Alberto QUISPE acting as scribe for Dr. Mary Page MD on 10/20/2022 at 10:24 AM.\par

## 2022-11-05 LAB
ALBUMIN SERPL ELPH-MCNC: 4.8 G/DL
ALP BLD-CCNC: 97 U/L
ALT SERPL-CCNC: 34 U/L
ANION GAP SERPL CALC-SCNC: 15 MMOL/L
AST SERPL-CCNC: 23 U/L
BASOPHILS # BLD AUTO: 0.04 K/UL
BASOPHILS NFR BLD AUTO: 0.4 %
BILIRUB SERPL-MCNC: 0.2 MG/DL
BUN SERPL-MCNC: 12 MG/DL
CALCIUM SERPL-MCNC: 10.3 MG/DL
CHLORIDE SERPL-SCNC: 99 MMOL/L
CHOLEST SERPL-MCNC: 165 MG/DL
CO2 SERPL-SCNC: 25 MMOL/L
CREAT SERPL-MCNC: 0.77 MG/DL
EGFR: 92 ML/MIN/1.73M2
EOSINOPHIL # BLD AUTO: 0.08 K/UL
EOSINOPHIL NFR BLD AUTO: 0.7 %
ESTIMATED AVERAGE GLUCOSE: 177 MG/DL
GLUCOSE SERPL-MCNC: 161 MG/DL
HBA1C MFR BLD HPLC: 7.8 %
HCT VFR BLD CALC: 42.7 %
HDLC SERPL-MCNC: 45 MG/DL
HGB BLD-MCNC: 13 G/DL
IMM GRANULOCYTES NFR BLD AUTO: 0.4 %
LDLC SERPL CALC-MCNC: 100 MG/DL
LYMPHOCYTES # BLD AUTO: 2.77 K/UL
LYMPHOCYTES NFR BLD AUTO: 25.3 %
MAN DIFF?: NORMAL
MCHC RBC-ENTMCNC: 28.4 PG
MCHC RBC-ENTMCNC: 30.4 GM/DL
MCV RBC AUTO: 93.2 FL
MONOCYTES # BLD AUTO: 0.5 K/UL
MONOCYTES NFR BLD AUTO: 4.6 %
NEUTROPHILS # BLD AUTO: 7.51 K/UL
NEUTROPHILS NFR BLD AUTO: 68.6 %
NONHDLC SERPL-MCNC: 120 MG/DL
PLATELET # BLD AUTO: 319 K/UL
POTASSIUM SERPL-SCNC: 5 MMOL/L
PROT SERPL-MCNC: 7.2 G/DL
RBC # BLD: 4.58 M/UL
RBC # FLD: 13.4 %
SODIUM SERPL-SCNC: 139 MMOL/L
TRIGL SERPL-MCNC: 99 MG/DL
WBC # FLD AUTO: 10.94 K/UL

## 2022-11-09 ENCOUNTER — TRANSCRIPTION ENCOUNTER (OUTPATIENT)
Age: 52
End: 2022-11-09

## 2022-11-17 ENCOUNTER — APPOINTMENT (OUTPATIENT)
Dept: FAMILY MEDICINE | Facility: CLINIC | Age: 52
End: 2022-11-17

## 2022-11-17 VITALS
TEMPERATURE: 98.4 F | OXYGEN SATURATION: 98 % | SYSTOLIC BLOOD PRESSURE: 136 MMHG | BODY MASS INDEX: 38.41 KG/M2 | HEIGHT: 64 IN | WEIGHT: 225 LBS | HEART RATE: 92 BPM | DIASTOLIC BLOOD PRESSURE: 88 MMHG

## 2022-11-17 PROCEDURE — 99214 OFFICE O/P EST MOD 30 MIN: CPT

## 2022-11-19 NOTE — ASSESSMENT
[FreeTextEntry1] : Acute encounter for 52 year old WF with PMH as stated in HPI / active list. \par \par Management : \par \par See HPI and Plan.\par \par Advised icing the affected area throughout the coming days, heat regimen advised for additional relief, follow up as needed.   \par \par Rest.  Ice. Compression.  Elevation.   Advised Ace wrap as compression method.\par \par +/- Ortho consult regarding possible Xray and walking boot placement.  \par \par Follow up Monday 11/21/22 if condition not improved.  \par \par Best wishes offered!

## 2022-11-19 NOTE — ADDENDUM
[FreeTextEntry1] : Medical record entries made by the scribe today, were at my direction and personally dictated to them by me, Dr. Mary Page on 11/17/2022.  I have reviewed the chart and agree that the record accurately reflects my personal performance of the history, physical exam, assessment and plan.\par \par Juan Alberto QUISPE acting as scribe for Dr. Mary Page MD on 11/17/2022 at 10:44 AM.\par

## 2022-11-19 NOTE — REVIEW OF SYSTEMS
[Joint Pain] : joint pain [Negative] : Neurological [Fever] : no fever [FreeTextEntry9] : left ankle injury s/p mechanical fall

## 2022-11-19 NOTE — HISTORY OF PRESENT ILLNESS
[FreeTextEntry8] : KORY VILLAGRAN is a 52 year old F who presents today s/p mechanical fall sustained on Saturday 11/12/22, CC left ankle and lower extremity pain.  Pt states she tripped on deformed asphalt at the foot of her driveway, fell forward and was able to brace fall, states she fell due to compromise of her ankle.  Pt states pain is worst when she is on her feet, palliated by rest, has treated with ice and heat since onset, states swelling has slightly increased since ADRIANNE.\par \par

## 2022-11-19 NOTE — PHYSICAL EXAM
[No Acute Distress] : no acute distress [Normal Sclera/Conjunctiva] : normal sclera/conjunctiva [No JVD] : no jugular venous distention [No Respiratory Distress] : no respiratory distress  [Clear to Auscultation] : lungs were clear to auscultation bilaterally [Normal Rate] : normal rate  [No Focal Deficits] : no focal deficits [Speech Grossly Normal] : speech grossly normal [Alert and Oriented x3] : oriented to person, place, and time [de-identified] : calm and engaging  [de-identified] : limited but full ROM of left ankle  [de-identified] : bruising and mild discoloration medial aspect left ankle FROM  MILD swelling

## 2023-01-19 ENCOUNTER — APPOINTMENT (OUTPATIENT)
Dept: FAMILY MEDICINE | Facility: CLINIC | Age: 53
End: 2023-01-19
Payer: COMMERCIAL

## 2023-01-19 VITALS
WEIGHT: 224 LBS | HEART RATE: 94 BPM | HEIGHT: 64 IN | DIASTOLIC BLOOD PRESSURE: 90 MMHG | BODY MASS INDEX: 38.24 KG/M2 | OXYGEN SATURATION: 98 % | SYSTOLIC BLOOD PRESSURE: 148 MMHG

## 2023-01-19 PROCEDURE — 36415 COLL VENOUS BLD VENIPUNCTURE: CPT

## 2023-01-19 PROCEDURE — 99214 OFFICE O/P EST MOD 30 MIN: CPT | Mod: 25

## 2023-01-22 NOTE — ADDENDUM
[FreeTextEntry1] : Medical record entries made by the scribe today, were at my direction and personally dictated to them by me, Dr. Mary Page on 01/19/2023.  I have reviewed the chart and agree that the record accurately reflects my personal performance of the history, physical exam, assessment and plan.\par \par Juan Alberto QUISPE acting as scribe for Dr. Mary Page MD on 01/19/2023 at 11:11 AM.\par

## 2023-01-22 NOTE — REVIEW OF SYSTEMS
[Joint Pain] : joint pain [Muscle Pain] : muscle pain [Fever] : no fever [Negative] : Psychiatric [FreeTextEntry9] : left foot pain, see HPI

## 2023-01-22 NOTE — PHYSICAL EXAM
[No Acute Distress] : no acute distress [Normal Sclera/Conjunctiva] : normal sclera/conjunctiva [No JVD] : no jugular venous distention [No Respiratory Distress] : no respiratory distress  [No Accessory Muscle Use] : no accessory muscle use [Normal Rate] : normal rate  [Regular Rhythm] : with a regular rhythm [No Edema] : there was no peripheral edema [No Focal Deficits] : no focal deficits [Speech Grossly Normal] : speech grossly normal [Alert and Oriented x3] : oriented to person, place, and time [de-identified] : TARYN

## 2023-01-22 NOTE — HISTORY OF PRESENT ILLNESS
[FreeTextEntry1] : Last seen in office November 2022 for acute issue, encounter reviewed. \par \par Presents for FUV on chronic medical conditions of: DM, dyslipidemia \par \par Last seen for same in October 2021 and encounter reviewed. \par  [de-identified] : \par In recent weeks KORY endorses:\par \par Pt states lingering left foot and Achilles discomfort, states that she never followed with and Ortho or Podiatrist regarding, states that she wore a walking boot "for a while" and condition has improved since onset.  \par \par Trulicity once a week, states fasting  today prior to presenting.  Pt states postprandial BGL typically in mid 200's.  Presents today wearing monitor.  \par \par Pt states her brother has been good in recent weeks, "he's making an effort."

## 2023-01-22 NOTE — ASSESSMENT
[FreeTextEntry1] : FUV for 53 year old WF with PMH as stated in HPI / active list. \par \par Management : \par \par See HPI and Plan.\par \par Labs in office today, will advise. \par \par Advised that she can alter that attachment site of monitor.  \par \par Podiatrist and Orthopedic consults advised regarding general DM care and left foot distress, referrals provided, follow up as needed. \par \par Importance of daily 30 minute walk stressed for exercise. Importance of drinking 60-80 oz of FREE WATER daily stressed. \par \par Best wishes offered!

## 2023-01-31 LAB
ALBUMIN SERPL ELPH-MCNC: 4.9 G/DL
ALP BLD-CCNC: 100 U/L
ALT SERPL-CCNC: 49 U/L
ANION GAP SERPL CALC-SCNC: 17 MMOL/L
AST SERPL-CCNC: 34 U/L
BASOPHILS # BLD AUTO: 0.05 K/UL
BASOPHILS NFR BLD AUTO: 0.4 %
BILIRUB SERPL-MCNC: <0.2 MG/DL
BUN SERPL-MCNC: 13 MG/DL
CALCIUM SERPL-MCNC: 10 MG/DL
CHLORIDE SERPL-SCNC: 97 MMOL/L
CHOLEST SERPL-MCNC: 166 MG/DL
CO2 SERPL-SCNC: 23 MMOL/L
CREAT SERPL-MCNC: 0.81 MG/DL
EGFR: 86 ML/MIN/1.73M2
EOSINOPHIL # BLD AUTO: 0.15 K/UL
EOSINOPHIL NFR BLD AUTO: 1.2 %
ESTIMATED AVERAGE GLUCOSE: 180 MG/DL
GLUCOSE SERPL-MCNC: 125 MG/DL
HBA1C MFR BLD HPLC: 7.9 %
HCT VFR BLD CALC: 42.9 %
HDLC SERPL-MCNC: 49 MG/DL
HGB BLD-MCNC: 13.7 G/DL
IMM GRANULOCYTES NFR BLD AUTO: 0.6 %
LDLC SERPL CALC-MCNC: 94 MG/DL
LYMPHOCYTES # BLD AUTO: 3.23 K/UL
LYMPHOCYTES NFR BLD AUTO: 25.3 %
MAN DIFF?: NORMAL
MCHC RBC-ENTMCNC: 28.5 PG
MCHC RBC-ENTMCNC: 31.9 GM/DL
MCV RBC AUTO: 89.2 FL
MONOCYTES # BLD AUTO: 0.72 K/UL
MONOCYTES NFR BLD AUTO: 5.6 %
NEUTROPHILS # BLD AUTO: 8.55 K/UL
NEUTROPHILS NFR BLD AUTO: 66.9 %
NONHDLC SERPL-MCNC: 117 MG/DL
PLATELET # BLD AUTO: 348 K/UL
POTASSIUM SERPL-SCNC: 4.8 MMOL/L
PROT SERPL-MCNC: 7.4 G/DL
RBC # BLD: 4.81 M/UL
RBC # FLD: 13.4 %
SODIUM SERPL-SCNC: 138 MMOL/L
TRIGL SERPL-MCNC: 114 MG/DL
WBC # FLD AUTO: 12.78 K/UL

## 2023-02-07 ENCOUNTER — TRANSCRIPTION ENCOUNTER (OUTPATIENT)
Age: 53
End: 2023-02-07

## 2023-03-14 ENCOUNTER — TRANSCRIPTION ENCOUNTER (OUTPATIENT)
Age: 53
End: 2023-03-14

## 2023-04-03 ENCOUNTER — TRANSCRIPTION ENCOUNTER (OUTPATIENT)
Age: 53
End: 2023-04-03

## 2023-04-08 ENCOUNTER — OFFICE (OUTPATIENT)
Dept: URBAN - METROPOLITAN AREA CLINIC 94 | Facility: CLINIC | Age: 53
Setting detail: OPHTHALMOLOGY
End: 2023-04-08
Payer: COMMERCIAL

## 2023-04-08 DIAGNOSIS — E11.3292: ICD-10-CM

## 2023-04-08 DIAGNOSIS — E11.3293: ICD-10-CM

## 2023-04-08 DIAGNOSIS — E11.3291: ICD-10-CM

## 2023-04-08 PROCEDURE — 92235 FLUORESCEIN ANGRPH MLTIFRAME: CPT | Performed by: OPHTHALMOLOGY

## 2023-04-08 PROCEDURE — 92012 INTRM OPH EXAM EST PATIENT: CPT | Performed by: OPHTHALMOLOGY

## 2023-04-08 PROCEDURE — 92134 CPTRZ OPH DX IMG PST SGM RTA: CPT | Performed by: OPHTHALMOLOGY

## 2023-04-08 ASSESSMENT — TONOMETRY
OD_IOP_MMHG: 16
OS_IOP_MMHG: 17

## 2023-04-08 ASSESSMENT — CONFRONTATIONAL VISUAL FIELD TEST (CVF)
OS_FINDINGS: FULL
OD_FINDINGS: FULL

## 2023-04-17 ENCOUNTER — APPOINTMENT (OUTPATIENT)
Dept: FAMILY MEDICINE | Facility: CLINIC | Age: 53
End: 2023-04-17
Payer: COMMERCIAL

## 2023-04-17 ENCOUNTER — NON-APPOINTMENT (OUTPATIENT)
Age: 53
End: 2023-04-17

## 2023-04-17 VITALS
SYSTOLIC BLOOD PRESSURE: 134 MMHG | OXYGEN SATURATION: 98 % | BODY MASS INDEX: 38.76 KG/M2 | WEIGHT: 227 LBS | DIASTOLIC BLOOD PRESSURE: 78 MMHG | HEART RATE: 97 BPM | HEIGHT: 64 IN

## 2023-04-17 DIAGNOSIS — B37.2 CANDIDIASIS OF SKIN AND NAIL: ICD-10-CM

## 2023-04-17 DIAGNOSIS — Z86.39 PERSONAL HISTORY OF OTHER ENDOCRINE, NUTRITIONAL AND METABOLIC DISEASE: ICD-10-CM

## 2023-04-17 DIAGNOSIS — R82.90 UNSPECIFIED ABNORMAL FINDINGS IN URINE: ICD-10-CM

## 2023-04-17 DIAGNOSIS — U07.1 COVID-19: ICD-10-CM

## 2023-04-17 DIAGNOSIS — Z87.2 PERSONAL HISTORY OF DISEASES OF THE SKIN AND SUBCUTANEOUS TISSUE: ICD-10-CM

## 2023-04-17 DIAGNOSIS — R31.29 OTHER MICROSCOPIC HEMATURIA: ICD-10-CM

## 2023-04-17 DIAGNOSIS — Z87.828 PERSONAL HISTORY OF OTHER (HEALED) PHYSICAL INJURY AND TRAUMA: ICD-10-CM

## 2023-04-17 PROCEDURE — 99396 PREV VISIT EST AGE 40-64: CPT | Mod: 25

## 2023-04-17 PROCEDURE — 36415 COLL VENOUS BLD VENIPUNCTURE: CPT

## 2023-04-17 PROCEDURE — 99213 OFFICE O/P EST LOW 20 MIN: CPT | Mod: 25

## 2023-04-17 ASSESSMENT — AXIALLENGTH_DERIVED
OD_AL: 23.8139
OS_AL: 24.1576

## 2023-04-17 ASSESSMENT — KERATOMETRY
OD_K2POWER_DIOPTERS: 45.50
OS_K1POWER_DIOPTERS: 43.75
OD_K1POWER_DIOPTERS: 43.75
OS_K2POWER_DIOPTERS: 44.75
OD_AXISANGLE_DEGREES: 095
OS_AXISANGLE_DEGREES: 059
METHOD_AUTO_MANUAL: AUTO

## 2023-04-17 ASSESSMENT — REFRACTION_AUTOREFRACTION
OD_SPHERE: -1.00
OS_SPHERE: -1.75
OD_CYLINDER: -1.25
OS_AXIS: 126
OD_AXIS: 011
OS_CYLINDER: -0.75

## 2023-04-17 ASSESSMENT — VISUAL ACUITY
OS_BCVA: 20/20
OD_BCVA: 20/20

## 2023-04-17 ASSESSMENT — SPHEQUIV_DERIVED
OS_SPHEQUIV: -2.125
OD_SPHEQUIV: -1.625

## 2023-04-18 PROBLEM — Z87.828 HISTORY OF SPRAIN OF ANKLE: Status: RESOLVED | Noted: 2022-11-19 | Resolved: 2023-04-17

## 2023-04-18 PROBLEM — Z86.39 HISTORY OF DIABETES MELLITUS: Status: RESOLVED | Noted: 2017-11-30 | Resolved: 2023-04-17

## 2023-04-18 PROBLEM — B37.2 CANDIDIASIS, CUTANEOUS: Status: ACTIVE | Noted: 2022-06-15

## 2023-04-18 PROBLEM — R31.29 MICROSCOPIC HEMATURIA: Status: RESOLVED | Noted: 2021-05-14 | Resolved: 2023-04-17

## 2023-04-18 PROBLEM — Z87.2 HISTORY OF DERMATITIS: Status: RESOLVED | Noted: 2022-06-15 | Resolved: 2023-04-17

## 2023-04-18 NOTE — ADDENDUM
[FreeTextEntry1] : Medical record entries made by the scribe today, were at my direction and personally dictated to them by me, Dr. Mary Page on 04/17/2023.  I have reviewed the chart and agree that the record accurately reflects my personal performance of the history, physical exam, assessment and plan.\par \par Juan Alberto QUISPE acting as scribe for Dr. Mary Page MD on 04/17/2023 at 11:04 AM.\par \par \par

## 2023-04-18 NOTE — HEALTH RISK ASSESSMENT
[Very Good] : ~his/her~  mood as very good [No falls in past year] : Patient reported no falls in the past year [0] : 1) Little interest or pleasure doing things: Not at all (0) [1] : 2) Feeling down, depressed, or hopeless for several days (1) [Patient reported mammogram was normal] : Patient reported mammogram was normal [Patient reported bone density results were normal] : Patient reported bone density results were normal [Patient reported colonoscopy was normal] : Patient reported colonoscopy was normal [None] : None [# of Members in Household ___] :  household currently consist of [unfilled] member(s) [Employed] : employed [College] : College [Single] : single [Fully functional (using the telephone, shopping, preparing meals, housekeeping, doing laundry, using] : Fully functional and needs no help or supervision to perform IADLs (using the telephone, shopping, preparing meals, housekeeping, doing laundry, using transportation, managing medications and managing finances) [Smoke Detector] : smoke detector [Carbon Monoxide Detector] : carbon monoxide detector [Seat Belt] :  uses seat belt [Never] : Never [FreeTextEntry1] : WEIGHT  [de-identified] : DENIES [de-identified] : Endocrinology    Psychologist as noted n HPI         GYN      Ophthal  [de-identified] : RARE  [de-identified] :  enjoys walking her dog  [de-identified] : eating too much bread....  [CMJ2Mykdg] : 1 [Change in mental status noted] : No change in mental status noted [Guns at Home] : no guns at home [Travel to Developing Areas] : does not  travel to developing areas [MammogramDate] : 11/2022 [MammogramComments] : Bi rads 2  [BoneDensityDate] : 11/20  [ColonoscopyDate] : 11/20  [ColonoscopyComments] : Dr. 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

## 2023-04-18 NOTE — PLAN
[FreeTextEntry1] : \par In review January 2022:\par CPE for 52 year old WF with PMH as stated in HPI / active list. \par \par Management : \par \par See HPI and Plan.\par \par Labs to be completed fasted at a later date, will advise.\par \par A1C to be evaluated, treatment plan to follow, will advise.    +/- endo referral \par \par To follow with Opthalmology for annual DM eye exam in October.  \par \par Best wishes offered!

## 2023-04-18 NOTE — HISTORY OF PRESENT ILLNESS
[FreeTextEntry1] : CPE\par Last seen in January 2023 for FUV, last CPE January 2022, encounters reviewed.\par \par KORY VILLAGRAN is a 53 year old F who presents today for a CPE.  Pt has no complaints, has been well, and denies any recent ER visits/hospitalizations/MVA's or MSK injuries. \par \par Regarding weight gain, pt states she was stress eating (work related) but is now walking regularly and engaging in chair yoga, has signed up for more classes.  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  Walking twice a day now, intermittent hip pain regarding but has improved greatly since she started walking regularly.  Onion rings, ice cream, "some other junk."  Is sleeping well.  \par \par Continues to follow with GYN Dr. Stroud regularly.  \par \par Colonoscopy needed in for October 2023, last completed in November 2020, 3 year FUV due to prep.  States she will be scheduling shortly.  \par \par Continues to follow with psych, states compliant with medications, no changes in dosages.  \par \par Has not followed with Podiatry.  Follows annually with Ophthalmology, states no complications or visual changes.  \par \par Has been to the dentist this year, states all is well.  \par \par Was planning on traveling this week to [place], trip cancelled and now planning to go in June 2023.\par  [de-identified] : \par In review January 2022:\par CPE\par Last CPE was 12/20 and encounter reviewed. \par Since has been seen in multiple FU with Diabetes educator as well as FUV with me  for chronic medical conditions of Diabetes/HLD and depression.  All relatively stable.\par \par KORY VILLAGRAN is a 52 year old F who presents today for a CPE.  Pt no longer follows with Endocrinology.  Pt states that last night while laying down she felt her heart "race" for some time, states that she believes it is due to anxiety.  Otherwise, patient has been well and denies any recent ER visits/hospitalizations/MVA's or MSK injuries. \par \par Pt states that she continues to walk but "not as much as she should be" states that she walk about 3x a week.\par \par With regard to PSYCH, continues to follow, has an appointment tomorrow via Zoom.  \par \par Pt states that she has been stressed by a bathroom remodeling occurring in her house.  Notes continued struggles with her brother.  \par

## 2023-04-25 LAB
ALBUMIN SERPL ELPH-MCNC: 4.6 G/DL
ALP BLD-CCNC: 98 U/L
ALT SERPL-CCNC: 38 U/L
ANION GAP SERPL CALC-SCNC: 16 MMOL/L
APPEARANCE: CLEAR
AST SERPL-CCNC: 22 U/L
BASOPHILS # BLD AUTO: 0.04 K/UL
BASOPHILS NFR BLD AUTO: 0.4 %
BILIRUB SERPL-MCNC: 0.4 MG/DL
BILIRUBIN URINE: NEGATIVE
BLOOD URINE: NEGATIVE
BUN SERPL-MCNC: 8 MG/DL
CALCIUM SERPL-MCNC: 10.2 MG/DL
CHLORIDE SERPL-SCNC: 101 MMOL/L
CO2 SERPL-SCNC: 26 MMOL/L
COLOR: NORMAL
CREAT SERPL-MCNC: 0.7 MG/DL
EGFR: 102 ML/MIN/1.73M2
EOSINOPHIL # BLD AUTO: 0.17 K/UL
EOSINOPHIL NFR BLD AUTO: 1.6 %
ESTIMATED AVERAGE GLUCOSE: 194 MG/DL
GLUCOSE QUALITATIVE U: NEGATIVE
GLUCOSE SERPL-MCNC: 136 MG/DL
HBA1C MFR BLD HPLC: 8.4 %
HCT VFR BLD CALC: 41.1 %
HGB BLD-MCNC: 12.7 G/DL
IMM GRANULOCYTES NFR BLD AUTO: 0.5 %
KETONES URINE: NEGATIVE
LEUKOCYTE ESTERASE URINE: NEGATIVE
LYMPHOCYTES # BLD AUTO: 2.91 K/UL
LYMPHOCYTES NFR BLD AUTO: 28.2 %
MAN DIFF?: NORMAL
MCHC RBC-ENTMCNC: 28.2 PG
MCHC RBC-ENTMCNC: 30.9 GM/DL
MCV RBC AUTO: 91.1 FL
MONOCYTES # BLD AUTO: 0.54 K/UL
MONOCYTES NFR BLD AUTO: 5.2 %
NEUTROPHILS # BLD AUTO: 6.62 K/UL
NEUTROPHILS NFR BLD AUTO: 64.1 %
NITRITE URINE: NEGATIVE
PH URINE: 6
PLATELET # BLD AUTO: 297 K/UL
POTASSIUM SERPL-SCNC: 4.7 MMOL/L
PROT SERPL-MCNC: 7.1 G/DL
PROTEIN URINE: NORMAL
RBC # BLD: 4.51 M/UL
RBC # FLD: 13.4 %
SODIUM SERPL-SCNC: 143 MMOL/L
SPECIFIC GRAVITY URINE: 1.02
TSH SERPL-ACNC: 0.96 UIU/ML
UROBILINOGEN URINE: NORMAL
WBC # FLD AUTO: 10.33 K/UL

## 2023-04-27 ENCOUNTER — TRANSCRIPTION ENCOUNTER (OUTPATIENT)
Age: 53
End: 2023-04-27

## 2023-04-27 LAB
CHOLEST SERPL-MCNC: 149 MG/DL
HDLC SERPL-MCNC: 46 MG/DL
LDLC SERPL CALC-MCNC: 81 MG/DL
NONHDLC SERPL-MCNC: 102 MG/DL
TRIGL SERPL-MCNC: 105 MG/DL

## 2023-06-06 ENCOUNTER — TRANSCRIPTION ENCOUNTER (OUTPATIENT)
Age: 53
End: 2023-06-06

## 2023-06-07 NOTE — REVIEW OF SYSTEMS
Keep a log of your blood pressure at home to take to your follow-up appointments.  You can take clonidine 0.1 mg as needed for top number >180 or bottom number >110.    Return to the ER at any time if you have chest pain, shortness of breath, uncontrolled BP despite clonidine, any other concerning symptom, or if you feel that you need to be admitted to the hospital.   [see HPI] : see HPI [Negative] : Neurological [Fever] : no fever [Chills] : no chills [Feeling Poorly] : not feeling poorly

## 2023-06-29 ENCOUNTER — APPOINTMENT (OUTPATIENT)
Dept: FAMILY MEDICINE | Facility: CLINIC | Age: 53
End: 2023-06-29

## 2023-07-27 NOTE — REVIEW OF SYSTEMS
General Sunscreen Counseling: I recommended a broad spectrum sunscreen with a SPF of 30 or higher.  I explained that SPF 30 sunscreens block approximately 97 percent of the sun's harmful rays.  Sunscreens should be applied at least 15 minutes prior to expected sun exposure and then every 2 hours after that as long as sun exposure continues. If swimming or exercising sunscreen should be reapplied every 45 minutes to an hour after getting wet or sweating.  One ounce, or the equivalent of a shot glass full of sunscreen, is adequate to protect the skin not covered by a bathing suit. I also recommended a lip balm with a sunscreen as well. Sun protective clothing can be used in lieu of sunscreen but must be worn the entire time you are exposed to the sun's rays. Detail Level: Zone [see HPI] : see HPI [Negative] : Neurological [Fever] : no fever [Chills] : no chills [Feeling Poorly] : not feeling poorly

## 2023-08-14 ENCOUNTER — APPOINTMENT (OUTPATIENT)
Dept: FAMILY MEDICINE | Facility: CLINIC | Age: 53
End: 2023-08-14
Payer: COMMERCIAL

## 2023-08-14 VITALS
WEIGHT: 222 LBS | OXYGEN SATURATION: 98 % | BODY MASS INDEX: 37.9 KG/M2 | SYSTOLIC BLOOD PRESSURE: 122 MMHG | HEART RATE: 92 BPM | DIASTOLIC BLOOD PRESSURE: 78 MMHG | HEIGHT: 64 IN

## 2023-08-14 DIAGNOSIS — F32.A DEPRESSION, UNSPECIFIED: ICD-10-CM

## 2023-08-14 PROCEDURE — 36415 COLL VENOUS BLD VENIPUNCTURE: CPT

## 2023-08-14 PROCEDURE — 99213 OFFICE O/P EST LOW 20 MIN: CPT | Mod: 25

## 2023-08-14 RX ORDER — FLASH GLUCOSE SENSOR
KIT MISCELLANEOUS
Qty: 28 | Refills: 2 | Status: ACTIVE | COMMUNITY
Start: 2020-10-08 | End: 1900-01-01

## 2023-08-20 NOTE — ASSESSMENT
[FreeTextEntry1] : The following plan was discussed with patient:  #1 Labs in office today, will advise.  #2 Refill(s) provided for: Atorvastatin Calcium 10 MG Ibuprofen 600 MG Metformin HCL  MG Freestyle Cristina 14 Day Sensor  Trulicity 1.5 MG/0.5ML    #3 We discussed the importance of aggressive risk factor modification, including continuing medications as directed, following a healthy diet of unprocessed, low saturated fat and carbohydrate diet as close to a plant based or Mediterranean as possible, REGULAR exercise at least 30 minutes of cardiovascular exercise daily.  FU in 4 months   Best wishes offered!

## 2023-08-20 NOTE — REVIEW OF SYSTEMS
[Fatigue] : fatigue [Negative] : Neurological [Fever] : no fever [Redness] : no redness [Vision Problems] : no vision problems [Anxiety] : anxiety [FreeTextEntry2] : lack of sleep  [FreeTextEntry9] : occasional left foot swelling  [de-identified] : work anxiety

## 2023-08-20 NOTE — PHYSICAL EXAM
[No Acute Distress] : no acute distress [Normal Sclera/Conjunctiva] : normal sclera/conjunctiva [No JVD] : no jugular venous distention [No Respiratory Distress] : no respiratory distress  [No Accessory Muscle Use] : no accessory muscle use [Normal Rate] : normal rate  [Regular Rhythm] : with a regular rhythm [No Edema] : there was no peripheral edema [No Focal Deficits] : no focal deficits [Alert and Oriented x3] : oriented to person, place, and time [Normal Outer Ear/Nose] : the outer ears and nose were normal in appearance [No Joint Swelling] : no joint swelling [Normal Insight/Judgement] : insight and judgment were intact [de-identified] : TARYN [de-identified] : calm and engaging  e [de-identified] : obese

## 2023-08-20 NOTE — ADDENDUM
[FreeTextEntry1] : Medical record entries made by the scribe today, were at my direction and personally dictated to them by me, Dr. Mary Page on 08/14/2023.  I have reviewed the chart and agree that the record accurately reflects my personal performance of the history, physical exam, assessment and plan.  I, Lady Adams  acting as scribe for Dr. Mary Page MD on 08/14/2023 at 10:22 AM.

## 2023-08-20 NOTE — HISTORY OF PRESENT ILLNESS
[FreeTextEntry1] : Last seen for same in January 2023 for same, encounter reviewed.  Presents for FUV on chronic medical conditions of: DM, dyslipidemia   Requesting refill(s) of: Atorvastatin Calcium 10 MG Ibuprofen 600 MG Metformin HCL  MG Freestyle Cristina 14 Day Sensor  Trulicity 1.5 MG/0.5ML   Otherwise has been well and denies any New MSK injuries, ER visits or hospitalizations.  Endorses compliance with medications.    advised patient to go to AHA web site and log in to Life's simple 7 for strategies:   Manage BP  Control Cholesterol  Maintaining normal Blood Glucose  Importance of being ACTIVE   30 minutes aerobic exercise most days of the week. Mediterranean Diet  Management of Weight  Smoking cessation     [de-identified] : In recent weeks KORY endorses:  Pt has been well and denies any recent ER visits/hospitalizations/MVA's or MSK injuries.  Pt states fatigue due to lack of sleep from anxiety from work. Pt states she has been told she snores.   Takes Ibuprofen occasionally for left foot edema.   Pt states she walks every day for about 30 minutes but is not consuming healthier food options.   Wears a CGM. Pt is not wearing one today.   Average glucoses remain in the 280 range.

## 2023-08-23 LAB
ALBUMIN SERPL ELPH-MCNC: 4.7 G/DL
ALP BLD-CCNC: 97 U/L
ALT SERPL-CCNC: 52 U/L
ANION GAP SERPL CALC-SCNC: 16 MMOL/L
AST SERPL-CCNC: 48 U/L
BILIRUB SERPL-MCNC: 0.3 MG/DL
BUN SERPL-MCNC: 10 MG/DL
CALCIUM SERPL-MCNC: 10 MG/DL
CHLORIDE SERPL-SCNC: 100 MMOL/L
CHOLEST SERPL-MCNC: 135 MG/DL
CO2 SERPL-SCNC: 24 MMOL/L
CREAT SERPL-MCNC: 0.86 MG/DL
EGFR: 80 ML/MIN/1.73M2
ESTIMATED AVERAGE GLUCOSE: 186 MG/DL
GLUCOSE SERPL-MCNC: 128 MG/DL
HBA1C MFR BLD HPLC: 8.1 %
HDLC SERPL-MCNC: 43 MG/DL
LDLC SERPL CALC-MCNC: 74 MG/DL
NONHDLC SERPL-MCNC: 92 MG/DL
POTASSIUM SERPL-SCNC: 4.6 MMOL/L
PROT SERPL-MCNC: 7.1 G/DL
SODIUM SERPL-SCNC: 139 MMOL/L
TRIGL SERPL-MCNC: 98 MG/DL

## 2023-08-25 RX ORDER — DULAGLUTIDE 1.5 MG/.5ML
1.5 INJECTION, SOLUTION SUBCUTANEOUS
Qty: 3 | Refills: 2 | Status: DISCONTINUED | COMMUNITY
Start: 2022-07-18 | End: 2023-08-25

## 2023-08-28 ENCOUNTER — APPOINTMENT (OUTPATIENT)
Dept: FAMILY MEDICINE | Facility: CLINIC | Age: 53
End: 2023-08-28
Payer: COMMERCIAL

## 2023-08-28 VITALS
OXYGEN SATURATION: 99 % | DIASTOLIC BLOOD PRESSURE: 84 MMHG | HEIGHT: 64 IN | BODY MASS INDEX: 38.07 KG/M2 | SYSTOLIC BLOOD PRESSURE: 132 MMHG | HEART RATE: 101 BPM | WEIGHT: 223 LBS

## 2023-08-28 DIAGNOSIS — L29.9 PRURITUS, UNSPECIFIED: ICD-10-CM

## 2023-08-28 PROCEDURE — 99214 OFFICE O/P EST MOD 30 MIN: CPT | Mod: 25

## 2023-08-28 PROCEDURE — 96372 THER/PROPH/DIAG INJ SC/IM: CPT

## 2023-08-28 RX ORDER — METHYLPRED ACET/NACL,ISO-OS/PF 40 MG/ML
40 VIAL (ML) INJECTION
Qty: 1 | Refills: 0 | Status: COMPLETED | OUTPATIENT
Start: 2023-08-28

## 2023-08-28 RX ADMIN — METHYLPREDNISOLONE ACETATE 0 MG/ML: 40 INJECTION, SUSPENSION INTRA-ARTICULAR; INTRALESIONAL; INTRAMUSCULAR; INTRASYNOVIAL; SOFT TISSUE at 00:00

## 2023-09-02 NOTE — PHYSICAL EXAM
[No Acute Distress] : no acute distress [Normal Sclera/Conjunctiva] : normal sclera/conjunctiva [Normal Outer Ear/Nose] : the outer ears and nose were normal in appearance [No JVD] : no jugular venous distention [No Respiratory Distress] : no respiratory distress  [No Accessory Muscle Use] : no accessory muscle use [Normal Rate] : normal rate  [Regular Rhythm] : with a regular rhythm [No Focal Deficits] : no focal deficits [Alert and Oriented x3] : oriented to person, place, and time [Normal Insight/Judgement] : insight and judgment were intact [de-identified] : calm and engaging [de-identified] : TARYN [de-identified] : scattered raised flat erythematous lesions RANDOM

## 2023-09-02 NOTE — HISTORY OF PRESENT ILLNESS
[FreeTextEntry8] : KORY VILLAGRAN is a 53 year old F who presents today for itching on side of face, arms and truck. States the symptoms started on Tuesday, 8/22.   Pt has been applying Nystatin-Triamcinolone topical for the itch. States relief while using it.   States she was weeding her yard last week.

## 2023-09-02 NOTE — REVIEW OF SYSTEMS
[Itching] : Itching [Skin Rash] : skin rash [Negative] : Psychiatric [Fever] : no fever [de-identified] : see HPI

## 2023-09-02 NOTE — ASSESSMENT
[FreeTextEntry1] : Acute encounter for 53 year old WF with PMH as stated in HPI / active list.   Management :   See HPI and Plan.  #1 Solu-Medrol 40 MG/ML injection given in office today   #2 Triamcinolone Acetonide 0.1% External Cream  #3 Prednisone 2 script provided  Best wishes offered! ADMIT

## 2023-09-02 NOTE — ADDENDUM
[FreeTextEntry1] : Medical record entries made by the scribe today, were at my direction and personally dictated to them by me, Dr. Mary Page on 08/28/2023.  I have reviewed the chart and agree that the record accurately reflects my personal performance of the history, physical exam, assessment and plan.  I, Lady Adams  acting as scribe for Dr. Mary Page MD on 08/28/2023 at 10:36 AM.

## 2023-09-11 ENCOUNTER — APPOINTMENT (OUTPATIENT)
Dept: FAMILY MEDICINE | Facility: CLINIC | Age: 53
End: 2023-09-11
Payer: COMMERCIAL

## 2023-09-11 VITALS
BODY MASS INDEX: 38.07 KG/M2 | SYSTOLIC BLOOD PRESSURE: 128 MMHG | HEART RATE: 102 BPM | OXYGEN SATURATION: 96 % | HEIGHT: 64 IN | WEIGHT: 223 LBS | DIASTOLIC BLOOD PRESSURE: 76 MMHG

## 2023-09-11 DIAGNOSIS — J30.89 OTHER ALLERGIC RHINITIS: ICD-10-CM

## 2023-09-11 DIAGNOSIS — R21 RASH AND OTHER NONSPECIFIC SKIN ERUPTION: ICD-10-CM

## 2023-09-11 PROCEDURE — 99214 OFFICE O/P EST MOD 30 MIN: CPT

## 2023-09-12 ENCOUNTER — APPOINTMENT (OUTPATIENT)
Dept: DERMATOLOGY | Facility: CLINIC | Age: 53
End: 2023-09-12
Payer: COMMERCIAL

## 2023-09-12 PROCEDURE — 99204 OFFICE O/P NEW MOD 45 MIN: CPT

## 2023-09-14 PROBLEM — J30.89 ALLERGIC RHINITIS DUE TO OTHER ALLERGIC TRIGGER: Status: ACTIVE | Noted: 2023-09-14

## 2023-09-14 PROBLEM — R21 RASH: Status: ACTIVE | Noted: 2023-09-14

## 2023-10-13 ENCOUNTER — APPOINTMENT (OUTPATIENT)
Dept: DERMATOLOGY | Facility: CLINIC | Age: 53
End: 2023-10-13
Payer: SELF-PAY

## 2023-10-13 PROCEDURE — D0127: CPT

## 2023-11-11 ENCOUNTER — OFFICE (OUTPATIENT)
Dept: URBAN - METROPOLITAN AREA CLINIC 94 | Facility: CLINIC | Age: 53
Setting detail: OPHTHALMOLOGY
End: 2023-11-11
Payer: COMMERCIAL

## 2023-11-11 DIAGNOSIS — E11.3293: ICD-10-CM

## 2023-11-11 PROCEDURE — 92014 COMPRE OPH EXAM EST PT 1/>: CPT | Performed by: SPECIALIST

## 2023-11-11 PROCEDURE — 92134 CPTRZ OPH DX IMG PST SGM RTA: CPT | Performed by: SPECIALIST

## 2023-11-11 PROCEDURE — 92235 FLUORESCEIN ANGRPH MLTIFRAME: CPT | Performed by: SPECIALIST

## 2023-11-11 ASSESSMENT — REFRACTION_AUTOREFRACTION
OD_AXIS: 011
OD_CYLINDER: -1.25
OS_SPHERE: -1.75
OS_AXIS: 126
OS_CYLINDER: -0.75
OD_SPHERE: -1.00

## 2023-11-11 ASSESSMENT — CONFRONTATIONAL VISUAL FIELD TEST (CVF)
OD_FINDINGS: FULL
OS_FINDINGS: FULL

## 2023-11-11 ASSESSMENT — SPHEQUIV_DERIVED
OS_SPHEQUIV: -2.125
OD_SPHEQUIV: -1.625

## 2023-11-13 ENCOUNTER — APPOINTMENT (OUTPATIENT)
Dept: FAMILY MEDICINE | Facility: CLINIC | Age: 53
End: 2023-11-13
Payer: COMMERCIAL

## 2023-11-13 VITALS
DIASTOLIC BLOOD PRESSURE: 86 MMHG | BODY MASS INDEX: 38.07 KG/M2 | OXYGEN SATURATION: 99 % | HEART RATE: 95 BPM | HEIGHT: 64 IN | SYSTOLIC BLOOD PRESSURE: 138 MMHG | WEIGHT: 223 LBS

## 2023-11-13 DIAGNOSIS — Z12.31 ENCOUNTER FOR SCREENING MAMMOGRAM FOR MALIGNANT NEOPLASM OF BREAST: ICD-10-CM

## 2023-11-13 PROCEDURE — 90686 IIV4 VACC NO PRSV 0.5 ML IM: CPT

## 2023-11-13 PROCEDURE — G0008: CPT

## 2023-11-13 PROCEDURE — 99214 OFFICE O/P EST MOD 30 MIN: CPT | Mod: 25

## 2023-11-13 PROCEDURE — 36415 COLL VENOUS BLD VENIPUNCTURE: CPT

## 2023-11-24 ENCOUNTER — NON-APPOINTMENT (OUTPATIENT)
Age: 53
End: 2023-11-24

## 2023-12-10 LAB
ALBUMIN SERPL ELPH-MCNC: 4.6 G/DL
ALP BLD-CCNC: 93 U/L
ALT SERPL-CCNC: 49 U/L
ANION GAP SERPL CALC-SCNC: 16 MMOL/L
AST SERPL-CCNC: 35 U/L
BASOPHILS # BLD AUTO: 0.07 K/UL
BASOPHILS NFR BLD AUTO: 0.7 %
BILIRUB SERPL-MCNC: 0.3 MG/DL
BUN SERPL-MCNC: 10 MG/DL
CALCIUM SERPL-MCNC: 9.6 MG/DL
CHLORIDE SERPL-SCNC: 97 MMOL/L
CHOLEST SERPL-MCNC: 146 MG/DL
CO2 SERPL-SCNC: 24 MMOL/L
CREAT SERPL-MCNC: 0.76 MG/DL
EGFR: 92 ML/MIN/1.73M2
EOSINOPHIL # BLD AUTO: 0.12 K/UL
EOSINOPHIL NFR BLD AUTO: 1.1 %
ESTIMATED AVERAGE GLUCOSE: 194 MG/DL
GLUCOSE SERPL-MCNC: 111 MG/DL
HBA1C MFR BLD HPLC: 8.4 %
HCT VFR BLD CALC: 40.7 %
HDLC SERPL-MCNC: 49 MG/DL
HGB BLD-MCNC: 12.6 G/DL
IMM GRANULOCYTES NFR BLD AUTO: 0.4 %
LDLC SERPL CALC-MCNC: 77 MG/DL
LYMPHOCYTES # BLD AUTO: 2.9 K/UL
LYMPHOCYTES NFR BLD AUTO: 27.2 %
MAN DIFF?: NORMAL
MCHC RBC-ENTMCNC: 27.8 PG
MCHC RBC-ENTMCNC: 31 GM/DL
MCV RBC AUTO: 89.8 FL
MONOCYTES # BLD AUTO: 0.53 K/UL
MONOCYTES NFR BLD AUTO: 5 %
NEUTROPHILS # BLD AUTO: 7.01 K/UL
NEUTROPHILS NFR BLD AUTO: 65.6 %
NONHDLC SERPL-MCNC: 97 MG/DL
PLATELET # BLD AUTO: 295 K/UL
POTASSIUM SERPL-SCNC: 4.3 MMOL/L
PROT SERPL-MCNC: 7 G/DL
RBC # BLD: 4.53 M/UL
RBC # FLD: 13.2 %
SODIUM SERPL-SCNC: 137 MMOL/L
TRIGL SERPL-MCNC: 109 MG/DL
WBC # FLD AUTO: 10.67 K/UL

## 2023-12-14 ENCOUNTER — APPOINTMENT (OUTPATIENT)
Dept: FAMILY MEDICINE | Facility: CLINIC | Age: 53
End: 2023-12-14
Payer: COMMERCIAL

## 2023-12-14 VITALS
HEART RATE: 99 BPM | BODY MASS INDEX: 37.73 KG/M2 | WEIGHT: 221 LBS | HEIGHT: 64 IN | DIASTOLIC BLOOD PRESSURE: 76 MMHG | SYSTOLIC BLOOD PRESSURE: 126 MMHG | OXYGEN SATURATION: 98 %

## 2023-12-14 PROCEDURE — 99214 OFFICE O/P EST MOD 30 MIN: CPT

## 2023-12-24 NOTE — PHYSICAL EXAM
[No Acute Distress] : no acute distress [Normal Sclera/Conjunctiva] : normal sclera/conjunctiva [Normal Outer Ear/Nose] : the outer ears and nose were normal in appearance [Supple] : supple [No Respiratory Distress] : no respiratory distress  [Normal Rate] : normal rate  [Regular Rhythm] : with a regular rhythm [No Focal Deficits] : no focal deficits [Alert and Oriented x3] : oriented to person, place, and time [Normal Insight/Judgement] : insight and judgment were intact [No Edema] : there was no peripheral edema [de-identified] : a bit heightened  engaging [de-identified] : TARYN

## 2023-12-24 NOTE — ADDENDUM
[FreeTextEntry1] : Medical record entries made by the scribe today, were at my direction and personally dictated to them by me, Dr. Mary Page on 12/14/2023.  I have reviewed the chart and agree that the record accurately reflects my personal performance of the history, physical exam, assessment and plan.  I, Lady Adams  acting as scribe for Dr. Mary Page MD on 12/14/2023 at 01:04 PM.

## 2023-12-24 NOTE — ASSESSMENT
[FreeTextEntry1] : The following plan was discussed with patient:  #1 Mounjaro 5 MG script provided  #2 Advised to d/c use of Metformin and Trulicity.   Will need ENDO CONSULT      #3 Importance of daily 30 minute walk stressed for exercise.  Other forms of exercise such as weight lifting encouraged as well.  FU in 2 weeks   Best wishes offered!

## 2023-12-24 NOTE — HISTORY OF PRESENT ILLNESS
[FreeTextEntry1] : Last seen in office November 2023 for same, encounter reviewed.  Presents to discuss DM management.  [de-identified] : In recent weeks KORY endorses:  SHAWANDA 1 C from November 2023   8.4%   Started wearing CGM on 12/11/23. States for the first three days, her BGL did not go under 200, "and I didn't eat anything". Has purchased a nutrition book to educate herself and has been actively searching for a nutritionist to help with meal plans. States she feels uncomfortable in her body and experiences low back pain carrying extra weight.  States she lays in bed for an hour in the morning reading. Will consider purchasing a treadmill and bicycle. Pt states she is not digesting metformin properly, states she sees the tablet in her stool, "I researched it, it's called a ghost pill". Is currently taking Trulicity 4.5, last dose was on Monday.

## 2023-12-28 ENCOUNTER — APPOINTMENT (OUTPATIENT)
Dept: FAMILY MEDICINE | Facility: CLINIC | Age: 53
End: 2023-12-28
Payer: COMMERCIAL

## 2023-12-28 VITALS
BODY MASS INDEX: 37.73 KG/M2 | OXYGEN SATURATION: 98 % | HEIGHT: 64 IN | SYSTOLIC BLOOD PRESSURE: 122 MMHG | HEART RATE: 81 BPM | WEIGHT: 221 LBS | DIASTOLIC BLOOD PRESSURE: 76 MMHG

## 2023-12-28 DIAGNOSIS — R45.4 IRRITABILITY AND ANGER: ICD-10-CM

## 2023-12-28 PROCEDURE — 99214 OFFICE O/P EST MOD 30 MIN: CPT

## 2024-01-02 PROBLEM — R45.4 IRRITABLE: Status: ACTIVE | Noted: 2024-01-02

## 2024-01-02 NOTE — REVIEW OF SYSTEMS
[Fatigue] : fatigue [Negative] : Neurological [Depression] : depression [Fever] : no fever [FreeTextEntry2] : see HPI

## 2024-01-02 NOTE — ASSESSMENT
[FreeTextEntry1] : The following plan was discussed with patient:  #1 Advised to record AM and PM BGL and call in 4-5 days with numbers. +/- switching to Dexcom sensors.   #2 Continue to take Mounjaro 5 MG and Metformin 100 MG BID.   #3 Follow with therapist regarding increasing mood changes.   #4 Advised to take Ashwagandha 500 MG.   #5 Advised MiraLAX every other day.    FU in 3-4 weeks  Best wishes offered!

## 2024-01-02 NOTE — PHYSICAL EXAM
[No Acute Distress] : no acute distress [Normal Sclera/Conjunctiva] : normal sclera/conjunctiva [Normal Outer Ear/Nose] : the outer ears and nose were normal in appearance [Supple] : supple [No Respiratory Distress] : no respiratory distress  [Normal Rate] : normal rate  [Regular Rhythm] : with a regular rhythm [No Edema] : there was no peripheral edema [No Focal Deficits] : no focal deficits [Alert and Oriented x3] : oriented to person, place, and time [Normal Insight/Judgement] : insight and judgment were intact [de-identified] : calm and engaging

## 2024-01-02 NOTE — ADDENDUM
[FreeTextEntry1] : Medical record entries made by the scribe today, were at my direction and personally dictated to them by me, Dr. Mary Page on 12/28/2023.  I have reviewed the chart and agree that the record accurately reflects my personal performance of the history, physical exam, assessment and plan.  I, Lady Adams  acting as scribe for Dr. Mary Page MD on 12/28/2023 at 12:53 AM.

## 2024-01-02 NOTE — HISTORY OF PRESENT ILLNESS
[FreeTextEntry1] : Last seen in office 12/14/23 for same, encounter reviewed.   Presents for FUV on chronic medical conditions of: DM management   Requesting medication refills of:  Freestyle kriss 14 day sensor  [de-identified] : In recent weeks KORY endorses:  Has taken two doses of Mounjaro 5MG. Reports no side effects. States she does not have decrease in appetite which she contributes to work stress. Has been taking half dose of Metformin, 500 BID and states her BGL was 400s. States she called the office and the nurse said she can go back on Metformin 1000 MG BID. Pt states GBM and BGL results greatly deviate from one another, ie. Freestyle kriss 106 while .  Pt states she become increasingly irate over the last few weeks, "It's out of control".   C/o fatigued. States she is having difficulty sleeping through the night. Would like to consider taking Omega XL for restless leg that she seen on TV. States she occasionally takes Benadryl to help sleep. Has taken Trazodone per a Neurologist in the past and states she was "jumpy" while on it. Has increased walking at night.   Had a bone density per Gyn and was advised to start taking calcium 1000 and D2 200. Is currently taking D3 supplement.

## 2024-01-09 ENCOUNTER — TRANSCRIPTION ENCOUNTER (OUTPATIENT)
Age: 54
End: 2024-01-09

## 2024-01-25 ENCOUNTER — APPOINTMENT (OUTPATIENT)
Dept: FAMILY MEDICINE | Facility: CLINIC | Age: 54
End: 2024-01-25

## 2024-02-29 ENCOUNTER — TRANSCRIPTION ENCOUNTER (OUTPATIENT)
Age: 54
End: 2024-02-29

## 2024-03-28 ENCOUNTER — APPOINTMENT (OUTPATIENT)
Dept: FAMILY MEDICINE | Facility: CLINIC | Age: 54
End: 2024-03-28
Payer: COMMERCIAL

## 2024-03-28 ENCOUNTER — TRANSCRIPTION ENCOUNTER (OUTPATIENT)
Age: 54
End: 2024-03-28

## 2024-03-28 VITALS
WEIGHT: 218 LBS | BODY MASS INDEX: 37.22 KG/M2 | SYSTOLIC BLOOD PRESSURE: 134 MMHG | OXYGEN SATURATION: 98 % | HEIGHT: 64 IN | DIASTOLIC BLOOD PRESSURE: 78 MMHG | HEART RATE: 96 BPM

## 2024-03-28 DIAGNOSIS — R51.9 HEADACHE, UNSPECIFIED: ICD-10-CM

## 2024-03-28 DIAGNOSIS — M54.2 CERVICALGIA: ICD-10-CM

## 2024-03-28 PROCEDURE — 99213 OFFICE O/P EST LOW 20 MIN: CPT

## 2024-03-28 PROCEDURE — G2211 COMPLEX E/M VISIT ADD ON: CPT

## 2024-03-28 RX ORDER — TRIAMCINOLONE ACETONIDE 1 MG/G
0.1 CREAM TOPICAL 3 TIMES DAILY
Qty: 45 | Refills: 1 | Status: COMPLETED | COMMUNITY
Start: 2023-08-28 | End: 2024-03-28

## 2024-03-28 RX ORDER — PREDNISONE 20 MG/1
20 TABLET ORAL DAILY
Qty: 8 | Refills: 0 | Status: COMPLETED | COMMUNITY
Start: 2023-08-28 | End: 2024-03-28

## 2024-03-28 NOTE — HISTORY OF PRESENT ILLNESS
[FreeTextEntry1] : headache and neck pain, refilled reymundo [de-identified] : 54 y.o F reports doing routine yoga class as per weekly. this past Sunday class was warm and patient developed a posterior headache radiating to the left neck and jaw 9/10. Pain decreased to 3/10 with Tylenol. headache returned the next morning. Patient has been taking Advil during the day and pain returns every morning. Patient has a routine dentist apt and BP was 139/90s. Patient usually has sinus headaches, and had an MRI years ago it was normal. Patient reports never having a headache like this before. Patient denies neck stiffness, nausea, vomiting, fever, chills, and  photosensitivity.

## 2024-03-28 NOTE — PHYSICAL EXAM
[No Acute Distress] : no acute distress [Well Nourished] : well nourished [Well Developed] : well developed [Well-Appearing] : well-appearing [Normal Sclera/Conjunctiva] : normal sclera/conjunctiva [PERRL] : pupils equal round and reactive to light [EOMI] : extraocular movements intact [Normal Outer Ear/Nose] : the outer ears and nose were normal in appearance [Normal Oropharynx] : the oropharynx was normal [No Lymphadenopathy] : no lymphadenopathy [No JVD] : no jugular venous distention [Supple] : supple [Thyroid Normal, No Nodules] : the thyroid was normal and there were no nodules present [No Respiratory Distress] : no respiratory distress  [No Accessory Muscle Use] : no accessory muscle use [Clear to Auscultation] : lungs were clear to auscultation bilaterally [Normal Rate] : normal rate  [Regular Rhythm] : with a regular rhythm [Normal S1, S2] : normal S1 and S2 [No Murmur] : no murmur heard [No Carotid Bruits] : no carotid bruits [No Abdominal Bruit] : a ~M bruit was not heard ~T in the abdomen [No Varicosities] : no varicosities [Pedal Pulses Present] : the pedal pulses are present [No Edema] : there was no peripheral edema [No Palpable Aorta] : no palpable aorta [No Extremity Clubbing/Cyanosis] : no extremity clubbing/cyanosis [Normal Posterior Cervical Nodes] : no posterior cervical lymphadenopathy [No CVA Tenderness] : no CVA  tenderness [Normal Anterior Cervical Nodes] : no anterior cervical lymphadenopathy [No Spinal Tenderness] : no spinal tenderness [No Joint Swelling] : no joint swelling [Grossly Normal Strength/Tone] : grossly normal strength/tone [No Rash] : no rash [Coordination Grossly Intact] : coordination grossly intact [No Focal Deficits] : no focal deficits [Normal Gait] : normal gait [Deep Tendon Reflexes (DTR)] : deep tendon reflexes were 2+ and symmetric [Normal Affect] : the affect was normal [Normal Insight/Judgement] : insight and judgment were intact

## 2024-04-05 ENCOUNTER — INPATIENT (INPATIENT)
Facility: HOSPITAL | Age: 54
LOS: 3 days | Discharge: HOME | DRG: 322 | End: 2024-04-09
Attending: HOSPITALIST | Admitting: STUDENT IN AN ORGANIZED HEALTH CARE EDUCATION/TRAINING PROGRAM
Payer: COMMERCIAL

## 2024-04-05 VITALS
TEMPERATURE: 98 F | HEIGHT: 64 IN | HEART RATE: 102 BPM | RESPIRATION RATE: 20 BRPM | OXYGEN SATURATION: 97 % | WEIGHT: 219.36 LBS | SYSTOLIC BLOOD PRESSURE: 145 MMHG | DIASTOLIC BLOOD PRESSURE: 86 MMHG

## 2024-04-05 LAB
ALBUMIN SERPL ELPH-MCNC: 4.4 G/DL — SIGNIFICANT CHANGE UP (ref 3.3–5.2)
ALP SERPL-CCNC: 90 U/L — SIGNIFICANT CHANGE UP (ref 40–120)
ALT FLD-CCNC: 29 U/L — SIGNIFICANT CHANGE UP
ANION GAP SERPL CALC-SCNC: 14 MMOL/L — SIGNIFICANT CHANGE UP (ref 5–17)
AST SERPL-CCNC: 21 U/L — SIGNIFICANT CHANGE UP
BASOPHILS # BLD AUTO: 0.05 K/UL — SIGNIFICANT CHANGE UP (ref 0–0.2)
BASOPHILS NFR BLD AUTO: 0.4 % — SIGNIFICANT CHANGE UP (ref 0–2)
BILIRUB SERPL-MCNC: 0.3 MG/DL — LOW (ref 0.4–2)
BUN SERPL-MCNC: 13.6 MG/DL — SIGNIFICANT CHANGE UP (ref 8–20)
CALCIUM SERPL-MCNC: 9.5 MG/DL — SIGNIFICANT CHANGE UP (ref 8.4–10.5)
CHLORIDE SERPL-SCNC: 97 MMOL/L — SIGNIFICANT CHANGE UP (ref 96–108)
CO2 SERPL-SCNC: 25 MMOL/L — SIGNIFICANT CHANGE UP (ref 22–29)
CREAT SERPL-MCNC: 0.72 MG/DL — SIGNIFICANT CHANGE UP (ref 0.5–1.3)
D DIMER BLD IA.RAPID-MCNC: <150 NG/ML DDU — SIGNIFICANT CHANGE UP
EGFR: 98 ML/MIN/1.73M2 — SIGNIFICANT CHANGE UP
EOSINOPHIL # BLD AUTO: 0.1 K/UL — SIGNIFICANT CHANGE UP (ref 0–0.5)
EOSINOPHIL NFR BLD AUTO: 0.7 % — SIGNIFICANT CHANGE UP (ref 0–6)
GLUCOSE BLDC GLUCOMTR-MCNC: 126 MG/DL — HIGH (ref 70–99)
GLUCOSE SERPL-MCNC: 123 MG/DL — HIGH (ref 70–99)
HCT VFR BLD CALC: 38 % — SIGNIFICANT CHANGE UP (ref 34.5–45)
HGB BLD-MCNC: 12.7 G/DL — SIGNIFICANT CHANGE UP (ref 11.5–15.5)
IMM GRANULOCYTES NFR BLD AUTO: 0.6 % — SIGNIFICANT CHANGE UP (ref 0–0.9)
LYMPHOCYTES # BLD AUTO: 26.7 % — SIGNIFICANT CHANGE UP (ref 13–44)
LYMPHOCYTES # BLD AUTO: 3.74 K/UL — HIGH (ref 1–3.3)
MCHC RBC-ENTMCNC: 28.3 PG — SIGNIFICANT CHANGE UP (ref 27–34)
MCHC RBC-ENTMCNC: 33.4 GM/DL — SIGNIFICANT CHANGE UP (ref 32–36)
MCV RBC AUTO: 84.8 FL — SIGNIFICANT CHANGE UP (ref 80–100)
MONOCYTES # BLD AUTO: 0.74 K/UL — SIGNIFICANT CHANGE UP (ref 0–0.9)
MONOCYTES NFR BLD AUTO: 5.3 % — SIGNIFICANT CHANGE UP (ref 2–14)
NEUTROPHILS # BLD AUTO: 9.31 K/UL — HIGH (ref 1.8–7.4)
NEUTROPHILS NFR BLD AUTO: 66.3 % — SIGNIFICANT CHANGE UP (ref 43–77)
PLATELET # BLD AUTO: 345 K/UL — SIGNIFICANT CHANGE UP (ref 150–400)
POTASSIUM SERPL-MCNC: 4.3 MMOL/L — SIGNIFICANT CHANGE UP (ref 3.5–5.3)
POTASSIUM SERPL-SCNC: 4.3 MMOL/L — SIGNIFICANT CHANGE UP (ref 3.5–5.3)
PROT SERPL-MCNC: 7.3 G/DL — SIGNIFICANT CHANGE UP (ref 6.6–8.7)
RBC # BLD: 4.48 M/UL — SIGNIFICANT CHANGE UP (ref 3.8–5.2)
RBC # FLD: 13.2 % — SIGNIFICANT CHANGE UP (ref 10.3–14.5)
SODIUM SERPL-SCNC: 136 MMOL/L — SIGNIFICANT CHANGE UP (ref 135–145)
TROPONIN T, HIGH SENSITIVITY RESULT: 8 NG/L — SIGNIFICANT CHANGE UP (ref 0–51)
TROPONIN T, HIGH SENSITIVITY RESULT: 9 NG/L — SIGNIFICANT CHANGE UP (ref 0–51)
WBC # BLD: 14.03 K/UL — HIGH (ref 3.8–10.5)
WBC # FLD AUTO: 14.03 K/UL — HIGH (ref 3.8–10.5)

## 2024-04-05 PROCEDURE — 99223 1ST HOSP IP/OBS HIGH 75: CPT

## 2024-04-05 PROCEDURE — 71045 X-RAY EXAM CHEST 1 VIEW: CPT | Mod: 26

## 2024-04-05 RX ORDER — INSULIN LISPRO 100/ML
VIAL (ML) SUBCUTANEOUS AT BEDTIME
Refills: 0 | Status: DISCONTINUED | OUTPATIENT
Start: 2024-04-05 | End: 2024-04-09

## 2024-04-05 RX ORDER — INSULIN LISPRO 100/ML
VIAL (ML) SUBCUTANEOUS
Refills: 0 | Status: DISCONTINUED | OUTPATIENT
Start: 2024-04-05 | End: 2024-04-09

## 2024-04-05 RX ORDER — ACETAMINOPHEN 500 MG
650 TABLET ORAL EVERY 6 HOURS
Refills: 0 | Status: DISCONTINUED | OUTPATIENT
Start: 2024-04-05 | End: 2024-04-09

## 2024-04-05 RX ORDER — ASPIRIN/CALCIUM CARB/MAGNESIUM 324 MG
324 TABLET ORAL ONCE
Refills: 0 | Status: COMPLETED | OUTPATIENT
Start: 2024-04-05 | End: 2024-04-05

## 2024-04-05 RX ORDER — DEXTROSE 50 % IN WATER 50 %
12.5 SYRINGE (ML) INTRAVENOUS ONCE
Refills: 0 | Status: DISCONTINUED | OUTPATIENT
Start: 2024-04-05 | End: 2024-04-09

## 2024-04-05 RX ORDER — DEXTROSE 50 % IN WATER 50 %
15 SYRINGE (ML) INTRAVENOUS ONCE
Refills: 0 | Status: DISCONTINUED | OUTPATIENT
Start: 2024-04-05 | End: 2024-04-09

## 2024-04-05 RX ORDER — DEXTROSE 50 % IN WATER 50 %
25 SYRINGE (ML) INTRAVENOUS ONCE
Refills: 0 | Status: DISCONTINUED | OUTPATIENT
Start: 2024-04-05 | End: 2024-04-09

## 2024-04-05 RX ORDER — DEXTROSE 10 % IN WATER 10 %
125 INTRAVENOUS SOLUTION INTRAVENOUS ONCE
Refills: 0 | Status: DISCONTINUED | OUTPATIENT
Start: 2024-04-05 | End: 2024-04-09

## 2024-04-05 RX ORDER — SODIUM CHLORIDE 9 MG/ML
1000 INJECTION, SOLUTION INTRAVENOUS
Refills: 0 | Status: DISCONTINUED | OUTPATIENT
Start: 2024-04-05 | End: 2024-04-09

## 2024-04-05 RX ORDER — GLUCAGON INJECTION, SOLUTION 0.5 MG/.1ML
1 INJECTION, SOLUTION SUBCUTANEOUS ONCE
Refills: 0 | Status: DISCONTINUED | OUTPATIENT
Start: 2024-04-05 | End: 2024-04-09

## 2024-04-05 RX ORDER — ATORVASTATIN CALCIUM 80 MG/1
10 TABLET, FILM COATED ORAL DAILY
Refills: 0 | Status: DISCONTINUED | OUTPATIENT
Start: 2024-04-05 | End: 2024-04-09

## 2024-04-05 RX ORDER — METHOCARBAMOL 500 MG/1
1500 TABLET, FILM COATED ORAL EVERY 8 HOURS
Refills: 0 | Status: DISCONTINUED | OUTPATIENT
Start: 2024-04-05 | End: 2024-04-09

## 2024-04-05 RX ADMIN — Medication 324 MILLIGRAM(S): at 16:39

## 2024-04-05 RX ADMIN — METHOCARBAMOL 1500 MILLIGRAM(S): 500 TABLET, FILM COATED ORAL at 22:27

## 2024-04-05 NOTE — ED PROVIDER NOTE - ATTENDING CONTRIBUTION TO CARE
54 f left jaw pain and cp over past few days. Lungs ctab and distal pulses intact. Cardiac w/u s/o to overnight team. I personally saw the patient with the resident, and completed the key components of the history and physical exam. I then discussed the management plan with the resident.

## 2024-04-05 NOTE — ED ADULT NURSE REASSESSMENT NOTE - NS ED NURSE REASSESS COMMENT FT1
Pt received from ARCHIE Person. Pt remains on CM in NSR. Pt is resting in stretcher comfortably at this time, no apparent distress noted at this time. Pt safety maintained. Pt denies any complaints at this time. Respirations even & unlabored. Awaiting CT, TTE, cardio consult.

## 2024-04-05 NOTE — ED ADULT TRIAGE NOTE - CHIEF COMPLAINT QUOTE
" I was having bath headache radiating to my jaw and neck and then yesterday I started to have pain under my ribs bilaterally and its radiating to left shoulder" pt denies chest pain, SOB. Was told by cardiologist to come to ED

## 2024-04-05 NOTE — ED PROVIDER NOTE - CLINICAL SUMMARY MEDICAL DECISION MAKING FREE TEXT BOX
Assessment: 54F with PMHx of HLD, T2DM presenting due to headache radiating to left jaw for the past 2 weeks and left under rib pain radiating to left upper shoulder since yesterday. Etiology likely MI vs musculoskeletal strain.    Plan:  - IV insert  - Aspirin 324 mg x1  - Cardiac monitoring  - Continuous pulse oximetry  - Vital signs  - CBC  - CMP  - Troponin x1  - CXR  - EKG

## 2024-04-05 NOTE — ED PROVIDER NOTE - MUSCULOSKELETAL, MLM
Spine appears normal, range of motion is not limited, no muscle or joint tenderness. Pain not reproducible upon palpation or head/body movement

## 2024-04-05 NOTE — ED PROVIDER NOTE - OBJECTIVE STATEMENT
54M with PMHx of HLD, T2DM presenting due to headache radiating to left jaw, 54M with PMHx of HLD, T2DM presenting due to headache radiating to left jaw for the past 2 weeks, and left under rib pain that radiates to left upper shoulder since yesterday. Patient endorses some palpitations but denies chest pain, SOB, abdominal pain. Headache starts from the back of the head and is relieved with ibuprofen, rates it as 8/10.

## 2024-04-05 NOTE — ED ADULT NURSE NOTE - NSFALLUNIVINTERV_ED_ALL_ED
Bed/Stretcher in lowest position, wheels locked, appropriate side rails in place/Call bell, personal items and telephone in reach/Instruct patient to call for assistance before getting out of bed/chair/stretcher/Non-slip footwear applied when patient is off stretcher/[place] to call system/Physically safe environment - no spills, clutter or unnecessary equipment/Purposeful proactive rounding/Room/bathroom lighting operational, light cord in reach

## 2024-04-05 NOTE — ED ADULT NURSE NOTE - OBJECTIVE STATEMENT
Patient care assumed at 3:50pm, A&O x 4, respiration even and unlabored, reports to ED with multiple complains. Patient reports having worst headache which lasted for about a week, B/L rib cage pain yesterday and today presents with pain to left side of rib radiating to the left shoulder.   No LOC, denies any nausea or vomiting.  No cough, SOB or respiratory distress noted. Patient on tele box with NSR. Safety maintained

## 2024-04-05 NOTE — ED PROVIDER NOTE - PRINCIPAL DIAGNOSIS
The Flu (Influenza)     The virus that causes the flu spreads through the air in droplets when someone who has the flu coughs, sneezes, laughs, or talks.   The flu (influenza) is an infection that affects your respiratory tract. This tract is made up of your mouth, nose, and lungs, and the passages between them. Unlike a cold, the flu can make you very ill. And it can lead to pneumonia, a serious lung infection. The flu can have serious complications and even cause death.  Who is at risk for the flu?  Anyone can get the flu. But you are more likely to become infected if you:  · Have a weakened immune system  · Work in a healthcare setting where you may be exposed to flu germs  · Live or work with someone who has the flu  · Havent had an annual flu shot  How does the flu spread?  The flu is caused by a virus. The virus spreads through the air in droplets when someone who has the flu coughs, sneezes, laughs, or talks. You can become infected when you inhale these viruses directly. You can also become infected when you touch a surface on which the droplets have landed and then transfer the germs to your eyes, nose, or mouth. Touching used tissues, or sharing utensils, drinking glasses, or a toothbrush from an infected person can expose you to flu viruses, too.  What are the symptoms of the flu?  Flu symptoms tend to come on quickly and may last a few days to a few weeks. They include:  · Fever usually higher than 100.4°F  (38°C) and chills  · Sore throat and headache  · Dry cough  · Runny nose  · Tiredness and weakness  · Muscle aches  Who is at risk for flu complications?  For some people, the flu can be very serious. The risk for complications is greater for:  · Children younger than age 5  · Adults ages 65 and older  · People with a chronic illness such as diabetes or heart, kidney, or lung disease  · People who live in a nursing home or long-term care facility   How is the flu treated?  The flu usually gets 
----- Message from Jessenia Jamison sent at 10/29/2018 12:09 PM CDT -----  Contact: pt   Name of Who is Calling: GUY CHAPPELL [[id number]]       What is the request in detail: patient is requesting a call in regards to follow up appointment for STD results.....Please contact to further discuss and advise.       Can the clinic reply by [facility]: no       What Number to Call Back if not [facility]: [phone]    
better after 7 days or so. In some cases, your healthcare provider may prescribe an antiviral medicine. This may help you get well a little sooner. For the medicine to help, you need to take it as soon as possible (ideally within 48 hours) after your symptoms start. If you develop pneumonia or other serious illness, you may need to stay in the hospital.  Easing flu symptoms  · Drink lots of fluids such as water, juice, and warm soup. A good rule is to drink enough so that you urinate your normal amount.  · Get plenty of rest.  · Ask your healthcare provider what to take for fever and pain.  · Call your provider if your fever is 100.4°F (38°C) or higher, or you become dizzy, lightheaded, or short of breath.  Taking steps to protect others  · Wash your hands often, especially after coughing or sneezing. Or clean your hands with an alcohol-based hand  containing at least 60% alcohol.  · Cough or sneeze into a tissue. Then throw the tissue away and wash your hands. If you dont have a tissue, cough and sneeze into your elbow.  · Stay home until at least 24 hours after you no longer have a fever or chills. Be sure the fever isnt being hidden by fever-reducing medicine.  · Dont share food, utensils, drinking glasses, or a toothbrush with others.  · Ask your healthcare provider if others in your household should get antiviral medicine to help them avoid infection.  How can the flu be prevented?  · One of the best ways to avoid the flu is to get a flu vaccine each year. The virus that causes the flu changes from year to year. For that reason, healthcare providers recommend getting the flu vaccine each year, as soon as it's available in your area. The vaccine is given as a shot. Your healthcare provider can tell you which vaccine is right for you. A nasal spray is also available but is not recommended for the 9350-1816 flu season. The CDC says this is because the nasal spray did not seem to protect against the flu 
over the last several flu seasons. In the past, it was meant for people ages 2 to 49.  · Wash your hands often. Frequent handwashing is a proven way to help prevent infection.  · Carry an alcohol-based hand gel containing at least 60% alcohol. Use it when you can't use soap and water. Then wash your hands as soon as you can.  · Avoid touching your eyes, nose, and mouth.  · At home and work, clean phones, computer keyboards, and toys often with disinfectant wipes.  · If possible, avoid close contact with others who have the flu or symptoms of the flu.  Handwashing tips  Handwashing is one of the best ways to prevent many common infections. If you are caring for or visiting someone with the flu, wash your hands each time you enter and leave the room. Follow these steps:  · Use warm water and plenty of soap. Rub your hands together well.  · Clean the whole hand, including under your nails, between your fingers, and up the wrists.  · Wash for at least 15 seconds.  · Rinse, letting the water run down your fingers, not up your wrists.  · Dry your hands well. Use a paper towel to turn off the faucet and open the door.  Using alcohol-based hand   Alcohol-based hand  are also a good choice. Use them when you can't use soap and water. Follow these steps:  · Squeeze about a tablespoon of gel into the palm of one hand.  · Rub your hands together briskly, cleaning the backs of your hands, the palms, between your fingers, and up the wrists.  · Rub until the gel is gone and your hands are completely dry.  Preventing the flu in healthcare settings  The flu is a special concern for people in hospitals and long-term care facilities. To help prevent the spread of flu, many hospitals and nursing homes take these steps:  · Healthcare providers wash their hands or use an alcohol-based hand  before and after treating each patient.  · People with the flu have private rooms and bathrooms or share a room with someone 
with the same infection.  · People who are at high risk for the flu but don't have it are encouraged to get the flu and pneumonia vaccines.  · All healthcare workers are encouraged or required to get flu shots.   Date Last Reviewed: 12/1/2016  © 9836-4329 [employer]. [address] [place] [address]. All rights reserved. This information is not intended as a substitute for professional medical care. Always follow your healthcare professional's instructions.        
Initial (On Arrival)
Chest pain

## 2024-04-05 NOTE — ED CDU PROVIDER INITIAL DAY NOTE - PHYSICAL EXAMINATION
Gen: No acute distress, non toxic  HEENT: Mucous membranes moist, pink conjunctivae, EOMI  CV: RRR, nl s1/s2.  Resp: CTAB, normal rate and effort  GI: Abdomen soft, NT, ND. No rebound, no guarding  Neuro: A&O x 3, moving all 4 extremities  MSK: +left cervical TTP, left anterior chest wall TTP. No spine or joint tenderness to palpation  Skin: No rashes. intact and perfused.

## 2024-04-05 NOTE — ED CDU PROVIDER INITIAL DAY NOTE - OBJECTIVE STATEMENT
54 year old female PMHx HLD, DM, depression presented to ED for left sided neck pain. Pt reports two weeks of left sided HA, with radiation to left neck. Pt was seen by PCP given muscle relaxers without relief. pt reports last night she felt bilateral lower rib cage pain L>R, was unable to sleep due to being nervous. pt states she has a [place] cardiology appt scheduled but has not seen group yet.   Pt denies fever, chills, cough, congestion, SOB, palpitations, diaphoresis, numbness, weakness, tingling, abd pain.

## 2024-04-05 NOTE — ED CDU PROVIDER INITIAL DAY NOTE - CLINICAL SUMMARY MEDICAL DECISION MAKING FREE TEXT BOX
PT with  chest pain  placed in obs for TTE, [place] cardiology consult, TELE. PT seen BY OBS PA found to be appropriate CDU PT care transferred to PA.

## 2024-04-05 NOTE — ED CDU PROVIDER INITIAL DAY NOTE - ATTENDING APP SHARED VISIT CONTRIBUTION OF CARE
I agree with the PA's note and was available for any issues/concerns. I was directly involved in patient care. My brief overall assessment is as follows:    54 year old female PMHx HLD, DM, depression presented to ED for left sided neck pain. Pt reports two weeks of left sided HA, with radiation to left neck. Pt was seen by PCP given muscle relaxers without relief. pt reports last night she felt bilateral lower rib cage pain L>R, was unable to sleep due to being nervous. pt states she has a [place] cardiology appt scheduled but has not seen group yet.  Patient with normal examination except for diffuse tenderness along the left lateral and left anterior chest regions. Patient with normal neurologic examination.    I, Heather Decker, personally saw the patient with the PA, and completed the key components of the history and physical exam. I then discussed the management plan with the PA.

## 2024-04-06 ENCOUNTER — RESULT REVIEW (OUTPATIENT)
Age: 54
End: 2024-04-06

## 2024-04-06 DIAGNOSIS — Z78.9 OTHER SPECIFIED HEALTH STATUS: Chronic | ICD-10-CM

## 2024-04-06 DIAGNOSIS — R07.9 CHEST PAIN, UNSPECIFIED: ICD-10-CM

## 2024-04-06 LAB
GLUCOSE BLDC GLUCOMTR-MCNC: 126 MG/DL — HIGH (ref 70–99)
GLUCOSE BLDC GLUCOMTR-MCNC: 134 MG/DL — HIGH (ref 70–99)
GLUCOSE BLDC GLUCOMTR-MCNC: 156 MG/DL — HIGH (ref 70–99)
GLUCOSE BLDC GLUCOMTR-MCNC: 183 MG/DL — HIGH (ref 70–99)

## 2024-04-06 PROCEDURE — 93306 TTE W/DOPPLER COMPLETE: CPT | Mod: 26

## 2024-04-06 PROCEDURE — 99223 1ST HOSP IP/OBS HIGH 75: CPT

## 2024-04-06 PROCEDURE — 99233 SBSQ HOSP IP/OBS HIGH 50: CPT

## 2024-04-06 PROCEDURE — 70450 CT HEAD/BRAIN W/O DYE: CPT | Mod: 26,MC

## 2024-04-06 PROCEDURE — 93010 ELECTROCARDIOGRAM REPORT: CPT

## 2024-04-06 RX ORDER — ATORVASTATIN CALCIUM 80 MG/1
1 TABLET, FILM COATED ORAL
Refills: 0 | DISCHARGE

## 2024-04-06 RX ORDER — TIRZEPATIDE 15 MG/.5ML
5 INJECTION, SOLUTION SUBCUTANEOUS
Refills: 0 | DISCHARGE

## 2024-04-06 RX ORDER — ENOXAPARIN SODIUM 100 MG/ML
40 INJECTION SUBCUTANEOUS EVERY 24 HOURS
Refills: 0 | Status: DISCONTINUED | OUTPATIENT
Start: 2024-04-06 | End: 2024-04-09

## 2024-04-06 RX ORDER — ONDANSETRON 8 MG/1
4 TABLET, FILM COATED ORAL EVERY 8 HOURS
Refills: 0 | Status: DISCONTINUED | OUTPATIENT
Start: 2024-04-06 | End: 2024-04-09

## 2024-04-06 RX ORDER — ASPIRIN/CALCIUM CARB/MAGNESIUM 324 MG
81 TABLET ORAL DAILY
Refills: 0 | Status: DISCONTINUED | OUTPATIENT
Start: 2024-04-06 | End: 2024-04-09

## 2024-04-06 RX ORDER — LANOLIN ALCOHOL/MO/W.PET/CERES
3 CREAM (GRAM) TOPICAL AT BEDTIME
Refills: 0 | Status: DISCONTINUED | OUTPATIENT
Start: 2024-04-06 | End: 2024-04-09

## 2024-04-06 RX ORDER — DESVENLAFAXINE 50 MG/1
1 TABLET, EXTENDED RELEASE ORAL
Refills: 0 | DISCHARGE

## 2024-04-06 RX ADMIN — METHOCARBAMOL 1500 MILLIGRAM(S): 500 TABLET, FILM COATED ORAL at 21:35

## 2024-04-06 RX ADMIN — ATORVASTATIN CALCIUM 10 MILLIGRAM(S): 80 TABLET, FILM COATED ORAL at 11:41

## 2024-04-06 RX ADMIN — Medication 650 MILLIGRAM(S): at 21:35

## 2024-04-06 RX ADMIN — Medication 650 MILLIGRAM(S): at 22:35

## 2024-04-06 NOTE — H&P ADULT - NSHPPHYSICALEXAM_GEN_ALL_CORE
Vital Signs Last 24 Hrs  T(C): 36.8 (06 Apr 2024 19:13), Max: 36.9 (05 Apr 2024 23:45)  T(F): 98.3 (06 Apr 2024 19:13), Max: 98.4 (05 Apr 2024 23:45)  HR: 87 (06 Apr 2024 19:13) (78 - 92)  BP: 137/79 (06 Apr 2024 19:13) (119/73 - 148/85)  BP(mean): --  RR: 20 (06 Apr 2024 19:13) (18 - 20)  SpO2: 95% (06 Apr 2024 19:13) (81% - 99%)    Parameters below as of 06 Apr 2024 19:13  Patient On (Oxygen Delivery Method): room air    General: Age-appearing, in no acute distress  Head: Normochephalic, atraumatic  ENMT: EOMI, neck supple  Cardiovascular: +S1, S2; Regular rate and rhythm, no murmurs, rubs, gallops  Respiratory: CTA BL, no wheezes, rales, rhonchi  Gastrointestinal: Abdomen soft, non-tender, +BS in all 4 quadrants  Extremities: No clubbing, cyanosis, or edema  Vascular: 2+ pulses, cap refill < 2 seconds  Neuro: Non-focal, AAOx4, sensation intact BL  Musculoskeletal: Normal tone, no deformities  Skin: Warm, dry; no acute rash seen  Psych: Appropriate, cooperative

## 2024-04-06 NOTE — ED CDU PROVIDER SUBSEQUENT DAY NOTE - CLINICAL SUMMARY MEDICAL DECISION MAKING FREE TEXT BOX
54F with PMHx of HLD, T2DM presenting due to headache radiating to left jaw for the past 2 weeks and left under rib pain radiating to left upper shoulder since yesterday.   Troponin stable EKG without ischemic changes  TTE, Tele, Cardiology

## 2024-04-06 NOTE — ED CDU PROVIDER SUBSEQUENT DAY NOTE - ATTENDING APP SHARED VISIT CONTRIBUTION OF CARE
I, Ethan Ball MD, performed the initial face to face bedside interview with this patient regarding history of present illness, review of symptoms and relevant past medical, social and family history.  I completed an independent physical examination.  I was the initial provider who evaluated this patient. I have signed out the follow up of any pending tests (i.e. labs, radiological studies) to the ACP.  I have communicated the patient’s plan of care and disposition with the ACP.

## 2024-04-06 NOTE — ED ADULT NURSE REASSESSMENT NOTE - NS ED NURSE REASSESS COMMENT FT1
tolerated lunch well, denies any new c/o at this time, updated on plan of care, questions asked and answered.

## 2024-04-06 NOTE — H&P ADULT - HISTORY OF PRESENT ILLNESS
55 yo female with pmhx HLD, T2DM, obesity presenting to the ED with complaint of intermittent CP, headache and radiating jaw pain x 2 weeks. She does not have person history of CAD, however, does have family hx of CAD on fathers side. she has never been to a cardiologist in the past, however, did have and upcoming appointment scheduled for establish care with Dr. Suarez on 4/17.  53 yo female with pmhx HLD, T2DM, obesity presenting to the ED with complaint of occipital headache radiating to the jaw x 2 weeks, with start of left sided chest pain radiating to the left shoulder/left arm. She does not have person history of CAD, however, does have family hx of CAD on fathers side. She has never been to a cardiologist in the past, however, did have and upcoming appointment scheduled for establish care with Dr. Suarez on 4/17. Patient  53 yo female with pmhx HLD, T2DM, obesity presenting to the ED with complaint of occipital headache radiating to the jaw x 2 weeks, with start of left sided chest pain radiating to the left shoulder/left arm. She does not have person history of CAD, however, does have family hx of CAD on fathers side. She has never been to a cardiologist in the past, however, did have and upcoming appointment scheduled for establish care with Dr. Suarez on 4/17. Patient was initially held in observation to be seen by cardiology. Given risk factors and family histroy, patient will go for cath Monday.

## 2024-04-06 NOTE — H&P ADULT - NSHPLABSRESULTS_GEN_ALL_CORE
12.7   14.03 )-----------( 345      ( 05 Apr 2024 16:47 )             38.0     05 Apr 2024 16:47    136    |  97     |  13.6   ----------------------------<  123    4.3     |  25.0   |  0.72     Ca    9.5        05 Apr 2024 16:47    TPro  7.3    /  Alb  4.4    /  TBili  0.3    /  DBili  x      /  AST  21     /  ALT  29     /  AlkPhos  90     05 Apr 2024 16:47      CAPILLARY BLOOD GLUCOSE      POCT Blood Glucose.: 156 mg/dL (06 Apr 2024 16:33)  POCT Blood Glucose.: 134 mg/dL (06 Apr 2024 11:43)  POCT Blood Glucose.: 126 mg/dL (06 Apr 2024 09:21)  POCT Blood Glucose.: 126 mg/dL (05 Apr 2024 22:30)    LIVER FUNCTIONS - ( 05 Apr 2024 16:47 )  Alb: 4.4 g/dL / Pro: 7.3 g/dL / ALK PHOS: 90 U/L / ALT: 29 U/L / AST: 21 U/L / GGT: x           Urinalysis Basic - ( 05 Apr 2024 16:47 )    Color: x / Appearance: x / SG: x / pH: x  Gluc: 123 mg/dL / Ketone: x  / Bili: x / Urobili: x   Blood: x / Protein: x / Nitrite: x   Leuk Esterase: x / RBC: x / WBC x   Sq Epi: x / Non Sq Epi: x / Bacteria: x 12.7   14.03 )-----------( 345      ( 05 Apr 2024 16:47 )             38.0     05 Apr 2024 16:47    136    |  97     |  13.6   ----------------------------<  123    4.3     |  25.0   |  0.72     Ca    9.5        05 Apr 2024 16:47    TPro  7.3    /  Alb  4.4    /  TBili  0.3    /  DBili  x      /  AST  21     /  ALT  29     /  AlkPhos  90     05 Apr 2024 16:47      CAPILLARY BLOOD GLUCOSE      POCT Blood Glucose.: 156 mg/dL (06 Apr 2024 16:33)  POCT Blood Glucose.: 134 mg/dL (06 Apr 2024 11:43)  POCT Blood Glucose.: 126 mg/dL (06 Apr 2024 09:21)  POCT Blood Glucose.: 126 mg/dL (05 Apr 2024 22:30)    LIVER FUNCTIONS - ( 05 Apr 2024 16:47 )  Alb: 4.4 g/dL / Pro: 7.3 g/dL / ALK PHOS: 90 U/L / ALT: 29 U/L / AST: 21 U/L / GGT: x           Urinalysis Basic - ( 05 Apr 2024 16:47 )    Color: x / Appearance: x / SG: x / pH: x  Gluc: 123 mg/dL / Ketone: x  / Bili: x / Urobili: x   Blood: x / Protein: x / Nitrite: x   Leuk Esterase: x / RBC: x / WBC x   Sq Epi: x / Non Sq Epi: x / Bacteria: x    < from: CT Head No Cont (04.06.24 @ 03:27) >      IMPRESSION:    No acute intracranial hemorrhage, mass effect, or CT evidence of an acute   vascular territorial infarct.    --- End of Report ---    < end of copied text >    < from: TTE W or WO Ultrasound Enhancing Agent (04.06.24 @ 14:51) >    CONCLUSIONS:      1. Sigmoid septum without LVOT obstruction.   2. Left ventricular cavity is normal in size. Left ventricular systolic function is normal with an ejection fraction visually estimated at 60 to 65 %. There are no regional wall motion abnormalities seen.   3. Normal left ventricular diastolic function.   4. Normal right ventricular cavity size and normal systolic function.   5. The left atrium is normal in size.   6. Trileaflet aortic valve with normal systolic excursion.   7. The right atrium is normal in size.   8. Trace mitral regurgitation.   9. Mild tricuspid regurgitation.  10. Estimated pulmonary artery systolic pressure is 31 mmHg, normal pulmonary artery pressure.  11. No pericardial effusion seen.    < end of copied text >

## 2024-04-06 NOTE — H&P ADULT - NSICDXFAMILYHX_GEN_ALL_CORE_FT
FAMILY HISTORY:  Father  Still living? Unknown  FH: CAD (coronary artery disease), Age at diagnosis: Age Unknown    Mother  Still living? Unknown  FH: pulmonary embolism, Age at diagnosis: Age Unknown    Sibling  Still living? Unknown  FH: CAD (coronary artery disease), Age at diagnosis: Age Unknown

## 2024-04-06 NOTE — CONSULT NOTE ADULT - SUBJECTIVE AND OBJECTIVE BOX
[facility] [facility]                                   [address] [place] [address]                                                      PHONE: [phone]                                                         FAX: [phone]  [email or web address]  ---------------------------------------------------------------------------------------------------------------------------------    Reason for Consult: CP/UA, jaw pain, HA    HPI:  KORY VILLAGRAN is an 54y Female with h/o DM, HLD, obesity, nonsmoker, and FH of CAD on father's side who presents c/o 2 weeks of intermittent atypical sx's of CP, HA, and radiating jaw pain of unclear etiology. No prior h/o CAD. She was awaiting an appt with Dr. Suarez for April 17. She has never seen a cardiologist.     PAST MEDICAL & SURGICAL HISTORY:  Hyperlipidemia    Diabetes    Major depression      No Known Allergies      MEDICATIONS  (STANDING):  atorvastatin 10 milliGRAM(s) Oral daily  dextrose 10% Bolus 125 milliLiter(s) IV Bolus once  dextrose 5%. 1000 milliLiter(s) (100 mL/Hr) IV Continuous <Continuous>  dextrose 5%. 1000 milliLiter(s) (50 mL/Hr) IV Continuous <Continuous>  dextrose 50% Injectable 25 Gram(s) IV Push once  dextrose 50% Injectable 12.5 Gram(s) IV Push once  glucagon  Injectable 1 milliGRAM(s) IntraMuscular once  insulin lispro (ADMELOG) corrective regimen sliding scale   SubCutaneous at bedtime  insulin lispro (ADMELOG) corrective regimen sliding scale   SubCutaneous three times a day before meals  methocarbamol 1500 milliGRAM(s) Oral every 8 hours    MEDICATIONS  (PRN):  acetaminophen     Tablet .. 650 milliGRAM(s) Oral every 6 hours PRN Temp greater or equal to 38C (100.4F), Mild Pain (1 - 3)  dextrose Oral Gel 15 Gram(s) Oral once PRN Blood Glucose LESS THAN 70 milliGRAM(s)/deciliter      Social History:  Cigarettes:        no            Alchohol:        no         Illicit Drug Abuse:  no    ROS: Negative other than as mentioned in HPI.    Vital Signs Last 24 Hrs  T(C): 36.4 (06 Apr 2024 07:32), Max: 37.1 (05 Apr 2024 15:38)  T(F): 97.6 (06 Apr 2024 07:32), Max: 98.8 (05 Apr 2024 15:38)  HR: 78 (06 Apr 2024 07:32) (78 - 102)  BP: 130/76 (06 Apr 2024 07:32) (119/73 - 145/86)  BP(mean): --  RR: 18 (06 Apr 2024 07:32) (18 - 20)  SpO2: 96% (06 Apr 2024 07:32) (81% - 99%)    Parameters below as of 06 Apr 2024 07:32  Patient On (Oxygen Delivery Method): room air      ICU Vital Signs Last 24 Hrs  KORY VILLAGRAN  I&O's Detail    I&O's Summary    Drug Dosing Weight  KORY VILLAGRAN      PHYSICAL EXAM:  General: NAD.  HEENT: Head; normocephalic.  Eyes: Pupils reactive.  Neck: Supple.  CARDIOVASCULAR: RRR.   LUNGS: Normal breath sounds bilaterally.  ABDOMEN: Soft, obese.  EXTREMITIES: No clubbing, cyanosis or edema.   SKIN: warm.  NEURO: Alert/oriented x 3.    PSYCH: normal affect.        LABS:                        12.7   14.03 )-----------( 345      ( 05 Apr 2024 16:47 )             38.0     04-05    136  |  97  |  13.6  ----------------------------<  123<H>  4.3   |  25.0  |  0.72    Ca    9.5      05 Apr 2024 16:47    TPro  7.3  /  Alb  4.4  /  TBili  0.3<L>  /  DBili  x   /  AST  21  /  ALT  29  /  AlkPhos  90  04-05    KORY VILLAGRAN        Urinalysis Basic - ( 05 Apr 2024 16:47 )    Color: x / Appearance: x / SG: x / pH: x  Gluc: 123 mg/dL / Ketone: x  / Bili: x / Urobili: x   Blood: x / Protein: x / Nitrite: x   Leuk Esterase: x / RBC: x / WBC x   Sq Epi: x / Non Sq Epi: x / Bacteria: x        RADIOLOGY & ADDITIONAL STUDIES:    INTERPRETATION OF TELEMETRY (personally reviewed): NSR 80's    ECG: Pending    ECHO: Pending        Assessment and Plan:  54y Female with h/o DM, HLD, obesity, nonsmoker, and FH of CAD on father's side who presents c/o 2 weeks of intermittent atypical sx's of CP, HA, and radiating jaw pain of unclear etiology. No prior h/o CAD. She was awaiting an appt with Dr. Suarez for April 17. She has never seen a cardiologist. Her sx's are concerning for possible UA.    1. Admit to observation with telemetry.  2. Trend CE's. Trop x 2 sets negative.  3. Obtain 2D echo to eval EF, valves, and wall motion.  4. Given concern for UA and with multiple cardiac risk factors, recommend cardiac cath on Monday to evaluate coronary anatomy.  5. Obtain 12 lead ECGs.  6. Patient is agreeable with above plan.

## 2024-04-06 NOTE — ED CDU PROVIDER SUBSEQUENT DAY NOTE - HISTORY
Pt resting comfortably at time of re-assessment. No events overnight. Pending TTE/Cardiolgoy. Will continue to monitor.

## 2024-04-06 NOTE — ED ADULT NURSE REASSESSMENT NOTE - NS ED NURSE REASSESS COMMENT FT1
Report received from off going RN, care of pt assumed at 0730. pt received resting on stretcher, resp even and unlabored. waiting for TTE test, denies chest pain or sob,  updated on plan of care, questions asked and answered.

## 2024-04-06 NOTE — H&P ADULT - ASSESSMENT
53 yo female with pmhx HLD, T2DM, obesity presenting to the ED with complaint of intermittent CP, headache and radiating jaw pain x 2 weeks.    Unstable Angina      HLD      T2DM      DVTppx: Lovenox 55 yo female with pmhx HLD, T2DM, obesity presenting to the ED with complaint of occipital headache radiating to the jaw x 2 weeks, with start of left sided chest pain radiating to the left shoulder/left arm.     Unstable Angina  -Admit to medicine on telemetry  -EKG NSR at 97 bpm  -Trop negative x3  -Continue Atorvastatin 10 mg qhs  -Start Aspirin 81 mg daily  -TTE with normal systolic and diastolic function  -Check Lipid profile/A1c/TSH in AM  -[facility] consulted, plan for cath monday    HLD  -Continue Atorvastatin as above  -FLP in am     T2DM  -Hold PO meds while hospitalized  -ISS/Accuchecks/A1c  -Carb controlled diet    DVTppx: Lovenox

## 2024-04-07 LAB
ALBUMIN SERPL ELPH-MCNC: 4 G/DL — SIGNIFICANT CHANGE UP (ref 3.3–5.2)
ALP SERPL-CCNC: 92 U/L — SIGNIFICANT CHANGE UP (ref 40–120)
ALT FLD-CCNC: 30 U/L — SIGNIFICANT CHANGE UP
ANION GAP SERPL CALC-SCNC: 12 MMOL/L — SIGNIFICANT CHANGE UP (ref 5–17)
AST SERPL-CCNC: 20 U/L — SIGNIFICANT CHANGE UP
BILIRUB SERPL-MCNC: <0.2 MG/DL — LOW (ref 0.4–2)
BUN SERPL-MCNC: 12.8 MG/DL — SIGNIFICANT CHANGE UP (ref 8–20)
CALCIUM SERPL-MCNC: 9.2 MG/DL — SIGNIFICANT CHANGE UP (ref 8.4–10.5)
CHLORIDE SERPL-SCNC: 102 MMOL/L — SIGNIFICANT CHANGE UP (ref 96–108)
CHOLEST SERPL-MCNC: 128 MG/DL — SIGNIFICANT CHANGE UP
CO2 SERPL-SCNC: 26 MMOL/L — SIGNIFICANT CHANGE UP (ref 22–29)
CREAT SERPL-MCNC: 0.8 MG/DL — SIGNIFICANT CHANGE UP (ref 0.5–1.3)
EGFR: 86 ML/MIN/1.73M2 — SIGNIFICANT CHANGE UP
GLUCOSE BLDC GLUCOMTR-MCNC: 122 MG/DL — HIGH (ref 70–99)
GLUCOSE BLDC GLUCOMTR-MCNC: 124 MG/DL — HIGH (ref 70–99)
GLUCOSE BLDC GLUCOMTR-MCNC: 161 MG/DL — HIGH (ref 70–99)
GLUCOSE BLDC GLUCOMTR-MCNC: 166 MG/DL — HIGH (ref 70–99)
GLUCOSE SERPL-MCNC: 172 MG/DL — HIGH (ref 70–99)
HDLC SERPL-MCNC: 39 MG/DL — LOW
LIPID PNL WITH DIRECT LDL SERPL: 72 MG/DL — SIGNIFICANT CHANGE UP
NON HDL CHOLESTEROL: 89 MG/DL — SIGNIFICANT CHANGE UP
POTASSIUM SERPL-MCNC: 4.2 MMOL/L — SIGNIFICANT CHANGE UP (ref 3.5–5.3)
POTASSIUM SERPL-SCNC: 4.2 MMOL/L — SIGNIFICANT CHANGE UP (ref 3.5–5.3)
PROT SERPL-MCNC: 6.7 G/DL — SIGNIFICANT CHANGE UP (ref 6.6–8.7)
SODIUM SERPL-SCNC: 140 MMOL/L — SIGNIFICANT CHANGE UP (ref 135–145)
TRIGL SERPL-MCNC: 85 MG/DL — SIGNIFICANT CHANGE UP
TSH SERPL-MCNC: 1.2 UIU/ML — SIGNIFICANT CHANGE UP (ref 0.27–4.2)

## 2024-04-07 PROCEDURE — 99233 SBSQ HOSP IP/OBS HIGH 50: CPT

## 2024-04-07 RX ADMIN — ATORVASTATIN CALCIUM 10 MILLIGRAM(S): 80 TABLET, FILM COATED ORAL at 12:36

## 2024-04-07 RX ADMIN — Medication 650 MILLIGRAM(S): at 13:30

## 2024-04-07 RX ADMIN — ENOXAPARIN SODIUM 40 MILLIGRAM(S): 100 INJECTION SUBCUTANEOUS at 12:36

## 2024-04-07 RX ADMIN — Medication 650 MILLIGRAM(S): at 12:36

## 2024-04-07 RX ADMIN — Medication 2: at 18:13

## 2024-04-07 RX ADMIN — Medication 81 MILLIGRAM(S): at 12:36

## 2024-04-07 RX ADMIN — Medication 2: at 09:45

## 2024-04-07 RX ADMIN — Medication 3 MILLIGRAM(S): at 21:44

## 2024-04-07 NOTE — PROGRESS NOTE ADULT - SUBJECTIVE AND OBJECTIVE BOX
[place] [facility] - [facility], THE [facility]                                   [address] [place] [address]                                                      PHONE: [phone]                                                         FAX: [phone]  [email or web address]  ---------------------------------------------------------------------------------------------------------------------------------    Overnight events/patient complaints: Still c/o intermittent jaw pain.        No Known Allergies    MEDICATIONS  (STANDING):  aspirin  chewable 81 milliGRAM(s) Oral daily  atorvastatin 10 milliGRAM(s) Oral daily  dextrose 10% Bolus 125 milliLiter(s) IV Bolus once  dextrose 5%. 1000 milliLiter(s) (100 mL/Hr) IV Continuous <Continuous>  dextrose 5%. 1000 milliLiter(s) (50 mL/Hr) IV Continuous <Continuous>  dextrose 50% Injectable 25 Gram(s) IV Push once  dextrose 50% Injectable 12.5 Gram(s) IV Push once  enoxaparin Injectable 40 milliGRAM(s) SubCutaneous every 24 hours  glucagon  Injectable 1 milliGRAM(s) IntraMuscular once  insulin lispro (ADMELOG) corrective regimen sliding scale   SubCutaneous three times a day before meals  insulin lispro (ADMELOG) corrective regimen sliding scale   SubCutaneous at bedtime  methocarbamol 1500 milliGRAM(s) Oral every 8 hours    MEDICATIONS  (PRN):  acetaminophen     Tablet .. 650 milliGRAM(s) Oral every 6 hours PRN Temp greater or equal to 38C (100.4F), Mild Pain (1 - 3)  aluminum hydroxide/magnesium hydroxide/simethicone Suspension 30 milliLiter(s) Oral every 4 hours PRN Dyspepsia  dextrose Oral Gel 15 Gram(s) Oral once PRN Blood Glucose LESS THAN 70 milliGRAM(s)/deciliter  melatonin 3 milliGRAM(s) Oral at bedtime PRN Insomnia  ondansetron Injectable 4 milliGRAM(s) IV Push every 8 hours PRN Nausea and/or Vomiting      Vital Signs Last 24 Hrs  T(C): 36.9 (07 Apr 2024 09:29), Max: 37.2 (07 Apr 2024 04:18)  T(F): 98.5 (07 Apr 2024 09:29), Max: 99 (07 Apr 2024 04:18)  HR: 93 (07 Apr 2024 09:29) (85 - 93)  BP: 129/75 (07 Apr 2024 09:29) (113/74 - 147/85)  BP(mean): --  RR: 17 (07 Apr 2024 09:29) (17 - 20)  SpO2: 98% (07 Apr 2024 09:29) (95% - 98%)    Parameters below as of 07 Apr 2024 09:29  Patient On (Oxygen Delivery Method): room air      ICU Vital Signs Last 24 Hrs  KORY VILLAGRAN  I&O's Detail    I&O's Summary    Drug Dosing Weight  KORY VILLAGRAN      PHYSICAL EXAM:  General: NAD.  HEENT: Head; normocephalic.  Eyes: Pupils reactive.  Neck: Supple.  CARDIOVASCULAR: RRR.   LUNGS: Normal breath sounds bilaterally.  ABDOMEN: Soft, obese.  EXTREMITIES: No clubbing, cyanosis or edema.   SKIN: warm.  NEURO: Alert/oriented x 3.    PSYCH: normal affect.        LABS:                        12.7   14.03 )-----------( 345      ( 05 Apr 2024 16:47 )             38.0     04-07    140  |  102  |  12.8  ----------------------------<  172<H>  4.2   |  26.0  |  0.80    Ca    9.2      07 Apr 2024 03:27    TPro  6.7  /  Alb  4.0  /  TBili  <0.2<L>  /  DBili  x   /  AST  20  /  ALT  30  /  AlkPhos  92  04-07    KORY VILLAGRAN        Urinalysis Basic - ( 07 Apr 2024 03:27 )    Color: x / Appearance: x / SG: x / pH: x  Gluc: 172 mg/dL / Ketone: x  / Bili: x / Urobili: x   Blood: x / Protein: x / Nitrite: x   Leuk Esterase: x / RBC: x / WBC x   Sq Epi: x / Non Sq Epi: x / Bacteria: x        RADIOLOGY & ADDITIONAL STUDIES:    INTERPRETATION OF TELEMETRY (personally reviewed): NSR 80's    ECG: NSR 97    ECHO:    1. Sigmoid septum without LVOT obstruction.   2. Left ventricular cavity is normal in size. Left ventricular systolic function is normal with an ejection fraction visually estimated at 60 to 65 %. There are no regional wall motion abnormalities seen.   3. Normal left ventricular diastolic function.   4. Normal right ventricular cavity size and normal systolic function.   5. The left atrium is normal in size.   6. Trileaflet aortic valve with normal systolic excursion.   7. The right atrium is normal in size.   8. Trace mitral regurgitation.   9. Mild tricuspid regurgitation.  10. Estimated pulmonary artery systolic pressure is 31 mmHg, normal pulmonary artery pressure.  11. No pericardial effusion seen.      Assessment and Plan:  54y Female with h/o DM, HLD, obesity, nonsmoker, and FH of CAD on father's side who presents c/o 2 weeks of intermittent atypical sx's of CP, HA, and radiating jaw pain of unclear etiology. No prior h/o CAD. She was awaiting an appt with Dr. Suarez for April 17. She has never seen a cardiologist. Her sx's are concerning for possible UA.    1. Continue to monitor telemetry.  2. Trend CE's. Trop x 2 sets negative.  3. 2D echo as noted above.  4. Given concern for UA and with multiple cardiac risk factors, recommend cardiac cath tomorrow to evaluate coronary anatomy.  5. She remains agreeable with above plan.

## 2024-04-07 NOTE — PROGRESS NOTE ADULT - SUBJECTIVE AND OBJECTIVE BOX
Patient is a 54y old  Female who presents with a chief complaint of Unstable Angina (06 Apr 2024 20:26)      Patient seen and examined at bedside  Chart reviewed , she is c/o mild left lower rib cage discomfort         ALLERGIES:  No Known Allergies    MEDICATIONS  (STANDING):  aspirin  chewable 81 milliGRAM(s) Oral daily  atorvastatin 10 milliGRAM(s) Oral daily  dextrose 10% Bolus 125 milliLiter(s) IV Bolus once  dextrose 5%. 1000 milliLiter(s) (100 mL/Hr) IV Continuous <Continuous>  dextrose 5%. 1000 milliLiter(s) (50 mL/Hr) IV Continuous <Continuous>  dextrose 50% Injectable 25 Gram(s) IV Push once  dextrose 50% Injectable 12.5 Gram(s) IV Push once  enoxaparin Injectable 40 milliGRAM(s) SubCutaneous every 24 hours  glucagon  Injectable 1 milliGRAM(s) IntraMuscular once  insulin lispro (ADMELOG) corrective regimen sliding scale   SubCutaneous three times a day before meals  insulin lispro (ADMELOG) corrective regimen sliding scale   SubCutaneous at bedtime  methocarbamol 1500 milliGRAM(s) Oral every 8 hours    MEDICATIONS  (PRN):  acetaminophen     Tablet .. 650 milliGRAM(s) Oral every 6 hours PRN Temp greater or equal to 38C (100.4F), Mild Pain (1 - 3)  aluminum hydroxide/magnesium hydroxide/simethicone Suspension 30 milliLiter(s) Oral every 4 hours PRN Dyspepsia  dextrose Oral Gel 15 Gram(s) Oral once PRN Blood Glucose LESS THAN 70 milliGRAM(s)/deciliter  melatonin 3 milliGRAM(s) Oral at bedtime PRN Insomnia  ondansetron Injectable 4 milliGRAM(s) IV Push every 8 hours PRN Nausea and/or Vomiting    Vital Signs Last 24 Hrs  T(F): 98.5 (07 Apr 2024 09:29), Max: 99 (07 Apr 2024 04:18)  HR: 93 (07 Apr 2024 09:29) (84 - 93)  BP: 129/75 (07 Apr 2024 09:29) (113/74 - 148/85)  RR: 17 (07 Apr 2024 09:29) (17 - 20)  SpO2: 98% (07 Apr 2024 09:29) (95% - 98%)  I&O's Summary      PHYSICAL EXAM:  General: sitting in the bed , comfortable   Neck: supple, no JVD   Lungs: CTA bilateral   Cardio: RRR, S1/S2, No murmur  Abdomen: Soft, Nontender, Nondistended; Bowel sounds present  Extremities: No calf tenderness, No pitting edema    LABS:                        12.7   14.03 )-----------( 345      ( 05 Apr 2024 16:47 )             38.0     04-07    140  |  102  |  12.8  ----------------------------<  172  4.2   |  26.0  |  0.80    Ca    9.2      07 Apr 2024 03:27    TPro  6.7  /  Alb  4.0  /  TBili  <0.2  /  DBili  x   /  AST  20  /  ALT  30  /  AlkPhos  92  04-07                    04-07 Chol 128 mg/dL LDL -- HDL 39 mg/dL Trig 85 mg/dL  TSH 1.20   TSH with FT4 reflex --  Total T3 --              POCT Blood Glucose.: 161 mg/dL (07 Apr 2024 08:43)  POCT Blood Glucose.: 183 mg/dL (06 Apr 2024 21:32)  POCT Blood Glucose.: 156 mg/dL (06 Apr 2024 16:33)  POCT Blood Glucose.: 134 mg/dL (06 Apr 2024 11:43)      Urinalysis Basic - ( 07 Apr 2024 03:27 )    Color: x / Appearance: x / SG: x / pH: x  Gluc: 172 mg/dL / Ketone: x  / Bili: x / Urobili: x   Blood: x / Protein: x / Nitrite: x   Leuk Esterase: x / RBC: x / WBC x   Sq Epi: x / Non Sq Epi: x / Bacteria: x          RADIOLOGY & ADDITIONAL TESTS:

## 2024-04-08 ENCOUNTER — TRANSCRIPTION ENCOUNTER (OUTPATIENT)
Age: 54
End: 2024-04-08

## 2024-04-08 LAB
ALBUMIN SERPL ELPH-MCNC: 4.1 G/DL — SIGNIFICANT CHANGE UP (ref 3.3–5.2)
ALP SERPL-CCNC: 81 U/L — SIGNIFICANT CHANGE UP (ref 40–120)
ALT FLD-CCNC: 30 U/L — SIGNIFICANT CHANGE UP
ANION GAP SERPL CALC-SCNC: 13 MMOL/L — SIGNIFICANT CHANGE UP (ref 5–17)
AST SERPL-CCNC: 19 U/L — SIGNIFICANT CHANGE UP
BASOPHILS # BLD AUTO: 0.04 K/UL — SIGNIFICANT CHANGE UP (ref 0–0.2)
BASOPHILS NFR BLD AUTO: 0.4 % — SIGNIFICANT CHANGE UP (ref 0–2)
BILIRUB SERPL-MCNC: 0.3 MG/DL — LOW (ref 0.4–2)
BUN SERPL-MCNC: 10.4 MG/DL — SIGNIFICANT CHANGE UP (ref 8–20)
CALCIUM SERPL-MCNC: 9.2 MG/DL — SIGNIFICANT CHANGE UP (ref 8.4–10.5)
CHLORIDE SERPL-SCNC: 102 MMOL/L — SIGNIFICANT CHANGE UP (ref 96–108)
CO2 SERPL-SCNC: 25 MMOL/L — SIGNIFICANT CHANGE UP (ref 22–29)
CREAT SERPL-MCNC: 0.72 MG/DL — SIGNIFICANT CHANGE UP (ref 0.5–1.3)
EGFR: 98 ML/MIN/1.73M2 — SIGNIFICANT CHANGE UP
EOSINOPHIL # BLD AUTO: 0.1 K/UL — SIGNIFICANT CHANGE UP (ref 0–0.5)
EOSINOPHIL NFR BLD AUTO: 1.1 % — SIGNIFICANT CHANGE UP (ref 0–6)
GLUCOSE BLDC GLUCOMTR-MCNC: 113 MG/DL — HIGH (ref 70–99)
GLUCOSE BLDC GLUCOMTR-MCNC: 119 MG/DL — HIGH (ref 70–99)
GLUCOSE BLDC GLUCOMTR-MCNC: 148 MG/DL — HIGH (ref 70–99)
GLUCOSE BLDC GLUCOMTR-MCNC: 233 MG/DL — HIGH (ref 70–99)
GLUCOSE SERPL-MCNC: 142 MG/DL — HIGH (ref 70–99)
HCT VFR BLD CALC: 36.8 % — SIGNIFICANT CHANGE UP (ref 34.5–45)
HGB BLD-MCNC: 12 G/DL — SIGNIFICANT CHANGE UP (ref 11.5–15.5)
IMM GRANULOCYTES NFR BLD AUTO: 0.6 % — SIGNIFICANT CHANGE UP (ref 0–0.9)
LYMPHOCYTES # BLD AUTO: 2.27 K/UL — SIGNIFICANT CHANGE UP (ref 1–3.3)
LYMPHOCYTES # BLD AUTO: 24.2 % — SIGNIFICANT CHANGE UP (ref 13–44)
MCHC RBC-ENTMCNC: 27.7 PG — SIGNIFICANT CHANGE UP (ref 27–34)
MCHC RBC-ENTMCNC: 32.6 GM/DL — SIGNIFICANT CHANGE UP (ref 32–36)
MCV RBC AUTO: 85 FL — SIGNIFICANT CHANGE UP (ref 80–100)
MONOCYTES # BLD AUTO: 0.56 K/UL — SIGNIFICANT CHANGE UP (ref 0–0.9)
MONOCYTES NFR BLD AUTO: 6 % — SIGNIFICANT CHANGE UP (ref 2–14)
NEUTROPHILS # BLD AUTO: 6.35 K/UL — SIGNIFICANT CHANGE UP (ref 1.8–7.4)
NEUTROPHILS NFR BLD AUTO: 67.7 % — SIGNIFICANT CHANGE UP (ref 43–77)
PLATELET # BLD AUTO: 296 K/UL — SIGNIFICANT CHANGE UP (ref 150–400)
POTASSIUM SERPL-MCNC: 4.2 MMOL/L — SIGNIFICANT CHANGE UP (ref 3.5–5.3)
POTASSIUM SERPL-SCNC: 4.2 MMOL/L — SIGNIFICANT CHANGE UP (ref 3.5–5.3)
PROT SERPL-MCNC: 6.8 G/DL — SIGNIFICANT CHANGE UP (ref 6.6–8.7)
RBC # BLD: 4.33 M/UL — SIGNIFICANT CHANGE UP (ref 3.8–5.2)
RBC # FLD: 13 % — SIGNIFICANT CHANGE UP (ref 10.3–14.5)
SODIUM SERPL-SCNC: 140 MMOL/L — SIGNIFICANT CHANGE UP (ref 135–145)
WBC # BLD: 9.38 K/UL — SIGNIFICANT CHANGE UP (ref 3.8–10.5)
WBC # FLD AUTO: 9.38 K/UL — SIGNIFICANT CHANGE UP (ref 3.8–10.5)

## 2024-04-08 PROCEDURE — 99233 SBSQ HOSP IP/OBS HIGH 50: CPT

## 2024-04-08 PROCEDURE — 93010 ELECTROCARDIOGRAM REPORT: CPT

## 2024-04-08 RX ORDER — TICAGRELOR 90 MG/1
90 TABLET ORAL EVERY 12 HOURS
Refills: 0 | Status: DISCONTINUED | OUTPATIENT
Start: 2024-04-08 | End: 2024-04-09

## 2024-04-08 RX ORDER — METOPROLOL TARTRATE 50 MG
12.5 TABLET ORAL DAILY
Refills: 0 | Status: DISCONTINUED | OUTPATIENT
Start: 2024-04-08 | End: 2024-04-09

## 2024-04-08 RX ADMIN — Medication 0: at 21:46

## 2024-04-08 RX ADMIN — ATORVASTATIN CALCIUM 10 MILLIGRAM(S): 80 TABLET, FILM COATED ORAL at 21:45

## 2024-04-08 RX ADMIN — METHOCARBAMOL 1500 MILLIGRAM(S): 500 TABLET, FILM COATED ORAL at 21:46

## 2024-04-08 RX ADMIN — Medication 81 MILLIGRAM(S): at 12:20

## 2024-04-08 RX ADMIN — TICAGRELOR 90 MILLIGRAM(S): 90 TABLET ORAL at 21:46

## 2024-04-08 NOTE — PROGRESS NOTE ADULT - SUBJECTIVE AND OBJECTIVE BOX
Patient is a 54y old  Female who presents with a chief complaint of Unstable Angina       Patient seen and examined at bedside  She is calm , less anxious today   denies any pain in the chest             ALLERGIES:  No Known Allergies    MEDICATIONS  (STANDING):  aspirin  chewable 81 milliGRAM(s) Oral daily  atorvastatin 10 milliGRAM(s) Oral daily  dextrose 10% Bolus 125 milliLiter(s) IV Bolus once  dextrose 5%. 1000 milliLiter(s) (100 mL/Hr) IV Continuous <Continuous>  dextrose 5%. 1000 milliLiter(s) (50 mL/Hr) IV Continuous <Continuous>  dextrose 50% Injectable 25 Gram(s) IV Push once  dextrose 50% Injectable 12.5 Gram(s) IV Push once  enoxaparin Injectable 40 milliGRAM(s) SubCutaneous every 24 hours  glucagon  Injectable 1 milliGRAM(s) IntraMuscular once  insulin lispro (ADMELOG) corrective regimen sliding scale   SubCutaneous three times a day before meals  insulin lispro (ADMELOG) corrective regimen sliding scale   SubCutaneous at bedtime  methocarbamol 1500 milliGRAM(s) Oral every 8 hours    MEDICATIONS  (PRN):  acetaminophen     Tablet .. 650 milliGRAM(s) Oral every 6 hours PRN Temp greater or equal to 38C (100.4F), Mild Pain (1 - 3)  aluminum hydroxide/magnesium hydroxide/simethicone Suspension 30 milliLiter(s) Oral every 4 hours PRN Dyspepsia  dextrose Oral Gel 15 Gram(s) Oral once PRN Blood Glucose LESS THAN 70 milliGRAM(s)/deciliter  melatonin 3 milliGRAM(s) Oral at bedtime PRN Insomnia  ondansetron Injectable 4 milliGRAM(s) IV Push every 8 hours PRN Nausea and/or Vomiting    Vital Signs Last 24 Hrs  T(C): 36.9 (08 Apr 2024 07:32), Max: 37 (07 Apr 2024 19:27)  T(F): 98.4 (08 Apr 2024 07:32), Max: 98.6 (07 Apr 2024 19:27)  HR: 80 (08 Apr 2024 07:32) (80 - 92)  BP: 105/71 (08 Apr 2024 07:32) (105/71 - 122/66)  BP(mean): --  RR: 18 (08 Apr 2024 07:32) (18 - 18)  SpO2: 94% (08 Apr 2024 07:32) (94% - 97%)    Parameters below as of 08 Apr 2024 07:32  Patient On (Oxygen Delivery Method): room air      PHYSICAL EXAM:  General: sitting in the bed , comfortable   Neck: supple, no JVD   Lungs: CTA bilateral   Cardio: RRR, S1/S2, No murmur  Abdomen: Soft, Nontender, Nondistended; Bowel sounds present  Extremities: No calf tenderness, No pitting edema    04-07    140  |  102  |  12.8  ----------------------------<  172<H>  4.2   |  26.0  |  0.80    Ca    9.2      07 Apr 2024 03:27    TPro  6.7  /  Alb  4.0  /  TBili  <0.2<L>  /  DBili  x   /  AST  20  /  ALT  30  /  AlkPhos  92  04-07            04-07 Chol 128 mg/dL LDL -- HDL 39 mg/dL Trig 85 mg/dL  TSH 1.20   TSH with FT4 reflex --  Total T3 --      CAPILLARY BLOOD GLUCOSE      POCT Blood Glucose.: 148 mg/dL (08 Apr 2024 09:24)  POCT Blood Glucose.: 124 mg/dL (07 Apr 2024 21:42)  POCT Blood Glucose.: 166 mg/dL (07 Apr 2024 17:36)  POCT Blood Glucose.: 122 mg/dL (07 Apr 2024 12:05)

## 2024-04-08 NOTE — PROGRESS NOTE ADULT - SUBJECTIVE AND OBJECTIVE BOX
Subjective:  54y  Female S/P PCI SHERRY to diagonal, via RRA band in place. Right wrist site benign. Patient awake and alert without complaints. Denies chest pain, sob, palps.    PAST MEDICAL & SURGICAL HISTORY:  Hyperlipidemia      Diabetes      Major depression      No pertinent past surgical history        FAMILY HISTORY:  FH: CAD (coronary artery disease) (Father, Sibling)    FH: pulmonary embolism (Mother)      MEDICATIONS  (STANDING):  aspirin  chewable 81 milliGRAM(s) Oral daily  atorvastatin 10 milliGRAM(s) Oral daily  dextrose 10% Bolus 125 milliLiter(s) IV Bolus once  dextrose 5%. 1000 milliLiter(s) (100 mL/Hr) IV Continuous <Continuous>  dextrose 5%. 1000 milliLiter(s) (50 mL/Hr) IV Continuous <Continuous>  dextrose 50% Injectable 25 Gram(s) IV Push once  dextrose 50% Injectable 12.5 Gram(s) IV Push once  enoxaparin Injectable 40 milliGRAM(s) SubCutaneous every 24 hours  glucagon  Injectable 1 milliGRAM(s) IntraMuscular once  insulin lispro (ADMELOG) corrective regimen sliding scale   SubCutaneous three times a day before meals  insulin lispro (ADMELOG) corrective regimen sliding scale   SubCutaneous at bedtime  methocarbamol 1500 milliGRAM(s) Oral every 8 hours  ticagrelor 90 milliGRAM(s) Oral every 12 hours    MEDICATIONS  (PRN):  acetaminophen     Tablet .. 650 milliGRAM(s) Oral every 6 hours PRN Temp greater or equal to 38C (100.4F), Mild Pain (1 - 3)  aluminum hydroxide/magnesium hydroxide/simethicone Suspension 30 milliLiter(s) Oral every 4 hours PRN Dyspepsia  dextrose Oral Gel 15 Gram(s) Oral once PRN Blood Glucose LESS THAN 70 milliGRAM(s)/deciliter  melatonin 3 milliGRAM(s) Oral at bedtime PRN Insomnia  ondansetron Injectable 4 milliGRAM(s) IV Push every 8 hours PRN Nausea and/or Vomiting        General: No fatigue, no fevers/chills  Respiratory: No dyspnea, no cough, no wheeze  CV: No chest pain, no palpitations  Abd: No nausea  Neuro: No headache, no dizziness  penicillins (Anaphylaxis; Rash)      Objective:  Vital Signs Last 24 Hrs  T(C): 36.7 (08 Apr 2024 13:40), Max: 37 (07 Apr 2024 19:27)  T(F): 98 (08 Apr 2024 13:40), Max: 98.6 (07 Apr 2024 19:27)  HR: 81 (08 Apr 2024 17:35) (74 - 92)  BP: 124/75 (08 Apr 2024 17:35) (105/71 - 146/80)  BP(mean): --  RR: 16 (08 Apr 2024 17:35) (16 - 18)  SpO2: 96% (08 Apr 2024 17:35) (94% - 97%)    Parameters below as of 08 Apr 2024 17:35  Patient On (Oxygen Delivery Method): room air                              12.0   9.38  )-----------( 296      ( 08 Apr 2024 09:51 )             36.8     04-08    140  |  102  |  10.4  ----------------------------<  142<H>  4.2   |  25.0  |  0.72    Ca    9.2      08 Apr 2024 09:51    TPro  6.8  /  Alb  4.1  /  TBili  0.3<L>  /  DBili  x   /  AST  19  /  ALT  30  /  AlkPhos  81  04-08        A/P: CAD s/p PCI: SHERRY to diagonal   1.                 Wrist management discussed with patient.  2.                 Post procedure EKG reviewed and stable.    3.                 Pt given instructions on importance of taking antiplatelet medication.    4.                 Aspirin/Brilinta/Statin.   5.                 Follow up with cardiologist in 2 weeks or sooner if needed  6.                 Bedrest x 2  hours  7.                 Remove radial band in 2 hours  8.                 cardiac rehab info provided/referral and communication to cardiac rehab completed  Subjective:  54y  Female S/P PCI SHERRY to diagonal, via RRA band in place. Right wrist site benign. Patient awake and alert without complaints. Denies chest pain, sob, palps.    PAST MEDICAL & SURGICAL HISTORY:  Hyperlipidemia      Diabetes      Major depression      No pertinent past surgical history        FAMILY HISTORY:  FH: CAD (coronary artery disease) (Father, Sibling)    FH: pulmonary embolism (Mother)      MEDICATIONS  (STANDING):  aspirin  chewable 81 milliGRAM(s) Oral daily  atorvastatin 10 milliGRAM(s) Oral daily  dextrose 10% Bolus 125 milliLiter(s) IV Bolus once  dextrose 5%. 1000 milliLiter(s) (100 mL/Hr) IV Continuous <Continuous>  dextrose 5%. 1000 milliLiter(s) (50 mL/Hr) IV Continuous <Continuous>  dextrose 50% Injectable 25 Gram(s) IV Push once  dextrose 50% Injectable 12.5 Gram(s) IV Push once  enoxaparin Injectable 40 milliGRAM(s) SubCutaneous every 24 hours  glucagon  Injectable 1 milliGRAM(s) IntraMuscular once  insulin lispro (ADMELOG) corrective regimen sliding scale   SubCutaneous three times a day before meals  insulin lispro (ADMELOG) corrective regimen sliding scale   SubCutaneous at bedtime  methocarbamol 1500 milliGRAM(s) Oral every 8 hours  ticagrelor 90 milliGRAM(s) Oral every 12 hours    MEDICATIONS  (PRN):  acetaminophen     Tablet .. 650 milliGRAM(s) Oral every 6 hours PRN Temp greater or equal to 38C (100.4F), Mild Pain (1 - 3)  aluminum hydroxide/magnesium hydroxide/simethicone Suspension 30 milliLiter(s) Oral every 4 hours PRN Dyspepsia  dextrose Oral Gel 15 Gram(s) Oral once PRN Blood Glucose LESS THAN 70 milliGRAM(s)/deciliter  melatonin 3 milliGRAM(s) Oral at bedtime PRN Insomnia  ondansetron Injectable 4 milliGRAM(s) IV Push every 8 hours PRN Nausea and/or Vomiting        General: No fatigue, no fevers/chills  Respiratory: No dyspnea, no cough, no wheeze  CV: No chest pain, no palpitations  Abd: No nausea  Neuro: No headache, no dizziness  penicillins (Anaphylaxis; Rash)      Objective:  Vital Signs Last 24 Hrs  T(C): 36.7 (08 Apr 2024 13:40), Max: 37 (07 Apr 2024 19:27)  T(F): 98 (08 Apr 2024 13:40), Max: 98.6 (07 Apr 2024 19:27)  HR: 81 (08 Apr 2024 17:35) (74 - 92)  BP: 124/75 (08 Apr 2024 17:35) (105/71 - 146/80)  BP(mean): --  RR: 16 (08 Apr 2024 17:35) (16 - 18)  SpO2: 96% (08 Apr 2024 17:35) (94% - 97%)    Parameters below as of 08 Apr 2024 17:35  Patient On (Oxygen Delivery Method): room air                              12.0   9.38  )-----------( 296      ( 08 Apr 2024 09:51 )             36.8     04-08    140  |  102  |  10.4  ----------------------------<  142<H>  4.2   |  25.0  |  0.72    Ca    9.2      08 Apr 2024 09:51    TPro  6.8  /  Alb  4.1  /  TBili  0.3<L>  /  DBili  x   /  AST  19  /  ALT  30  /  AlkPhos  81  04-08        A/P: CAD s/p PCI: SHERRY to diagonal   1.                 Wrist management discussed with patient.  2.                 Post procedure EKG reviewed and stable.    3.                 Pt given instructions on importance of taking antiplatelet medication.    4.                 Aspirin/Brilinta/Statin. Toprol xl 12.5mg po daily added, with parameters.  5.                 Follow up with cardiologist in 2 weeks or sooner if needed  6.                 Bedrest x 2  hours  7.                 Remove radial band in 2 hours  8.                 cardiac rehab info provided/referral and communication to cardiac rehab completed

## 2024-04-08 NOTE — DISCHARGE NOTE PROVIDER - NSDCMRMEDTOKEN_GEN_ALL_CORE_FT
atorvastatin 10 mg oral tablet: 1 tab(s) orally once a day (at bedtime)  desvenlafaxine (as succinate) 25 mg oral tablet, extended release: 1 tab(s) orally once a day (at bedtime)  MetFORMIN (Eqv-Fortamet) 500 mg oral tablet, extended release: 2 tab(s) orally 2 times a day  Mounjaro 5 mg/0.5 mL subcutaneous solution: 5 milligram(s) subcutaneously once a week   acetaminophen 325 mg oral tablet: 2 tab(s) orally every 6 hours As needed Temp greater or equal to 38C (100.4F), Mild Pain (1 - 3)  aspirin 81 mg oral tablet, chewable: 1 tab(s) orally once a day  atorvastatin 10 mg oral tablet: 1 tab(s) orally once a day (at bedtime)  desvenlafaxine (as succinate) 25 mg oral tablet, extended release: 1 tab(s) orally once a day (at bedtime)  melatonin 3 mg oral tablet: 1 tab(s) orally once a day (at bedtime) As needed Insomnia  methocarbamol 750 mg oral tablet: 1 tab(s) orally every 8 hours as needed for  moderate pain  Metoprolol Tartrate 25 mg oral tablet: 0.5 tab(s) orally once a day  Mounjaro 5 mg/0.5 mL subcutaneous solution: 5 milligram(s) subcutaneously once a week  ticagrelor 90 mg oral tablet: 1 tab(s) orally every 12 hours

## 2024-04-08 NOTE — PROGRESS NOTE ADULT - SUBJECTIVE AND OBJECTIVE BOX
Interventional Cardiology NP Precath Note      HPI: 54y Female with h/o DM, HLD, obesity, nonsmoker, and FH of CAD on father's side who presents c/o 2 weeks of intermittent atypical sx's of CP, HA, and radiating jaw pain of unclear etiology. No prior h/o CAD. She was awaiting an appt with Dr. Suarez for April 17. She has never seen a cardiologist. Her sx's are concerning for possible unstable for angina. Now for [facility].        ECHO: < from: TTE W or WO Ultrasound Enhancing Agent (04.06.24 @ 14:51) >     CONCLUSIONS:      1. Sigmoid septum without LVOT obstruction.   2. Left ventricular cavity is normal in size. Left ventricular systolic function is normal with an ejection fraction visually estimated at 60 to 65 %. There are no regional wall motion abnormalities seen.   3. Normal left ventricular diastolic function.   4. Normal right ventricular cavity size and normal systolic function.   5. The left atrium is normal in size.   6. Trileaflet aortic valve with normal systolic excursion.   7. The right atrium is normal in size.   8. Trace mitral regurgitation.   9. Mild tricuspid regurgitation.  10. Estimated pulmonary artery systolic pressure is 31 mmHg, normal pulmonary artery pressure.  11. No pericardial effusion seen.    < end of copied text >          ALL: NKDA, NKFA. Denies shellfish/Contrast dye allergy.  PAST MEDICAL & SURGICAL HISTORY:  Hyperlipidemia      Diabetes      Major depression      No pertinent past surgical history        SocHX: Denies EtoH/TOB/IVDU    MEDS:   acetaminophen     Tablet .. 650 milliGRAM(s) Oral every 6 hours PRN  aluminum hydroxide/magnesium hydroxide/simethicone Suspension 30 milliLiter(s) Oral every 4 hours PRN  aspirin  chewable 81 milliGRAM(s) Oral daily  atorvastatin 10 milliGRAM(s) Oral daily  dextrose 10% Bolus 125 milliLiter(s) IV Bolus once  dextrose 5%. 1000 milliLiter(s) IV Continuous <Continuous>  dextrose 5%. 1000 milliLiter(s) IV Continuous <Continuous>  dextrose 50% Injectable 25 Gram(s) IV Push once  dextrose 50% Injectable 12.5 Gram(s) IV Push once  dextrose Oral Gel 15 Gram(s) Oral once PRN  enoxaparin Injectable 40 milliGRAM(s) SubCutaneous every 24 hours  glucagon  Injectable 1 milliGRAM(s) IntraMuscular once  insulin lispro (ADMELOG) corrective regimen sliding scale   SubCutaneous at bedtime  insulin lispro (ADMELOG) corrective regimen sliding scale   SubCutaneous three times a day before meals  melatonin 3 milliGRAM(s) Oral at bedtime PRN  methocarbamol 1500 milliGRAM(s) Oral every 8 hours  ondansetron Injectable 4 milliGRAM(s) IV Push every 8 hours PRN    T(C): 36.7 (04-08-24 @ 13:40), Max: 37 (04-07-24 @ 19:27)  HR: 79 (04-08-24 @ 13:40) (79 - 92)  BP: 146/80 (04-08-24 @ 13:40) (105/71 - 146/80)  RR: 16 (04-08-24 @ 13:40) (16 - 18)  SpO2: 96% (04-08-24 @ 13:40) (94% - 97%)  Wt(kg): --                              12.0   9.38  )-----------( 296      ( 08 Apr 2024 09:51 )             36.8     04-08    140  |  102  |  10.4  ----------------------------<  142<H>  4.2   |  25.0  |  0.72    Ca    9.2      08 Apr 2024 09:51    TPro  6.8  /  Alb  4.1  /  TBili  0.3<L>  /  DBili  x   /  AST  19  /  ALT  30  /  AlkPhos  81  04-08          EKG:	NSR no acute st changes				  ASA _____3				Mallampati class: _______2_	            BRA: ____1.4%_____  A/P:  54y Female with h/o DM, HLD, obesity, nonsmoker, and FH of CAD on father's side who presents c/o 2 weeks of intermittent atypical sx's of CP, HA, and radiating jaw pain of unclear etiology now for C  -Proceed with [facility] as planned  -Asa 81mg po x 1  -Keep NPO  -Consent to obtained by IC  -Procedure explained at lenght with patient and verbalizes understanding          Risks & benefits of procedure and sedation and risks and benefits for the alternative therapy have been explained to the patient in layman’s terms including but not limited to: allergic reaction, bleeding, infection, arrhythmia, respiratory compromise, renal and vascular compromise, limb damage, MI, CVA, emergent CABG/Vascular Surgery and death. Informed consent obtained and in chart.

## 2024-04-08 NOTE — PROGRESS NOTE ADULT - ASSESSMENT
53 yo female with pmhx HLD, T2DM, obesity presenting to the ED with complaint of occipital headache radiating to the jaw x 2 weeks, with start of left sided chest pain radiating to the left shoulder/left arm.     1-Unstable Angina  -Trop negative x3  -Continue Atorvastatin 10 mg qhs  -Start Aspirin 81 mg daily  -TTE with normal systolic and diastolic function  -[facility] consulted, plan for cath monday    2-HLD  -Continue Atorvastatin  LDL 72 at goal     3-T2DM  -Hold PO meds while hospitalized  -ISS/Accuchecks/A1c  -Carb controlled diet    4- Depression / anxiety   cont home meds     DVTppx: Lovenox
53 yo female with pmhx HLD, T2DM, obesity presenting to the ED with complaint of occipital headache radiating to the jaw x 2 weeks, with start of left sided chest pain radiating to the left shoulder/left arm.     1-Unstable Angina  for LHC today   -Continue Atorvastatin 10 mg qhs and asa   -TTE with normal systolic and diastolic function  cardiology on board     2-HLD  -Continue Atorvastatin  LDL is 72 at goal     3-T2DM  -Hold PO meds while hospitalized  -ISS/Accuchecks/A1c  -Carb controlled diet    4- Depression / anxiety   cont home meds     DVTppx: Lovenox    Dc home pending LHC and result

## 2024-04-08 NOTE — DISCHARGE NOTE PROVIDER - CARE PROVIDER_API CALL
Mario Arreguin  Interventional Cardiology  [address] [place] [id number]  Phone: [phone]  Fax: [phone]  Follow Up Time: 1 week

## 2024-04-08 NOTE — DISCHARGE NOTE PROVIDER - NSDCFUSCHEDAPPT_GEN_ALL_CORE_FT
[facility]  [facility] [address]  Scheduled Appointment: 04/25/2024    [facility] [address]  Scheduled Appointment: 05/16/2024     Dapsone Counseling: I discussed with the patient the risks of dapsone including but not limited to hemolytic anemia, agranulocytosis, rashes, methemoglobinemia, kidney failure, peripheral neuropathy, headaches, GI upset, and liver toxicity.  Patients who start dapsone require monitoring including baseline LFTs and weekly CBCs for the first month, then every month thereafter.  The patient verbalized understanding of the proper use and possible adverse effects of dapsone.  All of the patient's questions and concerns were addressed.

## 2024-04-08 NOTE — DISCHARGE NOTE PROVIDER - HOSPITAL COURSE
53 yo female with pmhx HLD, T2DM, obesity presenting to the ED with complaint of occipital headache radiating to the jaw x 2 weeks, with start of left sided chest pain radiating to the left shoulder/left arm.     1-Unstable Angina-LHC   2 weeks of intermittent atypical sx's of CP, HA, and radiating jaw pain s/p PCI to the Diag 1 artery.   CAD  -s/p PCI to D1  -c/w aspirin 81mg daily  -c/w brillinta 90mg q12h  -c/w atorvastatin 10mg daily  -c/w toprol 12.5mg daily  -TTE with normal systolic and diastolic function  cardiology follow up     2-HLD  -Continue Atorvastatin  LDL is 72 at goal     3-T2DM  -Hold PO meds while hospitalized  -ISS/Accuchecks/A1c  -Carb controlled diet    4- Depression / anxiety   cont home meds

## 2024-04-09 ENCOUNTER — TRANSCRIPTION ENCOUNTER (OUTPATIENT)
Age: 54
End: 2024-04-09

## 2024-04-09 VITALS
SYSTOLIC BLOOD PRESSURE: 115 MMHG | RESPIRATION RATE: 18 BRPM | OXYGEN SATURATION: 94 % | TEMPERATURE: 98 F | HEART RATE: 79 BPM | DIASTOLIC BLOOD PRESSURE: 74 MMHG

## 2024-04-09 LAB
ANION GAP SERPL CALC-SCNC: 14 MMOL/L — SIGNIFICANT CHANGE UP (ref 5–17)
BASOPHILS # BLD AUTO: 0.05 K/UL — SIGNIFICANT CHANGE UP (ref 0–0.2)
BASOPHILS NFR BLD AUTO: 0.5 % — SIGNIFICANT CHANGE UP (ref 0–2)
BUN SERPL-MCNC: 11.2 MG/DL — SIGNIFICANT CHANGE UP (ref 8–20)
CALCIUM SERPL-MCNC: 9.5 MG/DL — SIGNIFICANT CHANGE UP (ref 8.4–10.5)
CHLORIDE SERPL-SCNC: 103 MMOL/L — SIGNIFICANT CHANGE UP (ref 96–108)
CO2 SERPL-SCNC: 25 MMOL/L — SIGNIFICANT CHANGE UP (ref 22–29)
CREAT SERPL-MCNC: 0.87 MG/DL — SIGNIFICANT CHANGE UP (ref 0.5–1.3)
EGFR: 78 ML/MIN/1.73M2 — SIGNIFICANT CHANGE UP
EOSINOPHIL # BLD AUTO: 0.11 K/UL — SIGNIFICANT CHANGE UP (ref 0–0.5)
EOSINOPHIL NFR BLD AUTO: 1.1 % — SIGNIFICANT CHANGE UP (ref 0–6)
GLUCOSE BLDC GLUCOMTR-MCNC: 171 MG/DL — HIGH (ref 70–99)
GLUCOSE SERPL-MCNC: 139 MG/DL — HIGH (ref 70–99)
HCT VFR BLD CALC: 38.5 % — SIGNIFICANT CHANGE UP (ref 34.5–45)
HGB BLD-MCNC: 12.5 G/DL — SIGNIFICANT CHANGE UP (ref 11.5–15.5)
IMM GRANULOCYTES NFR BLD AUTO: 0.5 % — SIGNIFICANT CHANGE UP (ref 0–0.9)
LYMPHOCYTES # BLD AUTO: 2.23 K/UL — SIGNIFICANT CHANGE UP (ref 1–3.3)
LYMPHOCYTES # BLD AUTO: 22.6 % — SIGNIFICANT CHANGE UP (ref 13–44)
MCHC RBC-ENTMCNC: 28 PG — SIGNIFICANT CHANGE UP (ref 27–34)
MCHC RBC-ENTMCNC: 32.5 GM/DL — SIGNIFICANT CHANGE UP (ref 32–36)
MCV RBC AUTO: 86.3 FL — SIGNIFICANT CHANGE UP (ref 80–100)
MONOCYTES # BLD AUTO: 0.64 K/UL — SIGNIFICANT CHANGE UP (ref 0–0.9)
MONOCYTES NFR BLD AUTO: 6.5 % — SIGNIFICANT CHANGE UP (ref 2–14)
NEUTROPHILS # BLD AUTO: 6.78 K/UL — SIGNIFICANT CHANGE UP (ref 1.8–7.4)
NEUTROPHILS NFR BLD AUTO: 68.8 % — SIGNIFICANT CHANGE UP (ref 43–77)
PLATELET # BLD AUTO: 287 K/UL — SIGNIFICANT CHANGE UP (ref 150–400)
POTASSIUM SERPL-MCNC: 4.5 MMOL/L — SIGNIFICANT CHANGE UP (ref 3.5–5.3)
POTASSIUM SERPL-SCNC: 4.5 MMOL/L — SIGNIFICANT CHANGE UP (ref 3.5–5.3)
RBC # BLD: 4.46 M/UL — SIGNIFICANT CHANGE UP (ref 3.8–5.2)
RBC # FLD: 13.2 % — SIGNIFICANT CHANGE UP (ref 10.3–14.5)
SODIUM SERPL-SCNC: 141 MMOL/L — SIGNIFICANT CHANGE UP (ref 135–145)
WBC # BLD: 9.86 K/UL — SIGNIFICANT CHANGE UP (ref 3.8–10.5)
WBC # FLD AUTO: 9.86 K/UL — SIGNIFICANT CHANGE UP (ref 3.8–10.5)

## 2024-04-09 PROCEDURE — 99238 HOSP IP/OBS DSCHRG MGMT 30/<: CPT

## 2024-04-09 PROCEDURE — C1725: CPT

## 2024-04-09 PROCEDURE — 80061 LIPID PANEL: CPT

## 2024-04-09 PROCEDURE — 71045 X-RAY EXAM CHEST 1 VIEW: CPT

## 2024-04-09 PROCEDURE — C9600: CPT | Mod: LD

## 2024-04-09 PROCEDURE — 99285 EMERGENCY DEPT VISIT HI MDM: CPT | Mod: 25

## 2024-04-09 PROCEDURE — 84484 ASSAY OF TROPONIN QUANT: CPT

## 2024-04-09 PROCEDURE — C1887: CPT

## 2024-04-09 PROCEDURE — 93306 TTE W/DOPPLER COMPLETE: CPT

## 2024-04-09 PROCEDURE — 70450 CT HEAD/BRAIN W/O DYE: CPT | Mod: MC

## 2024-04-09 PROCEDURE — 93005 ELECTROCARDIOGRAM TRACING: CPT

## 2024-04-09 PROCEDURE — 36415 COLL VENOUS BLD VENIPUNCTURE: CPT

## 2024-04-09 PROCEDURE — 85025 COMPLETE CBC W/AUTO DIFF WBC: CPT

## 2024-04-09 PROCEDURE — 80048 BASIC METABOLIC PNL TOTAL CA: CPT

## 2024-04-09 PROCEDURE — 80053 COMPREHEN METABOLIC PANEL: CPT

## 2024-04-09 PROCEDURE — C1874: CPT

## 2024-04-09 PROCEDURE — 84443 ASSAY THYROID STIM HORMONE: CPT

## 2024-04-09 PROCEDURE — C1894: CPT

## 2024-04-09 PROCEDURE — C1769: CPT

## 2024-04-09 PROCEDURE — 93458 L HRT ARTERY/VENTRICLE ANGIO: CPT | Mod: 59

## 2024-04-09 PROCEDURE — 82962 GLUCOSE BLOOD TEST: CPT

## 2024-04-09 PROCEDURE — 85379 FIBRIN DEGRADATION QUANT: CPT

## 2024-04-09 PROCEDURE — G0378: CPT

## 2024-04-09 RX ORDER — METOPROLOL TARTRATE 50 MG
0.5 TABLET ORAL
Qty: 15 | Refills: 0
Start: 2024-04-09 | End: 2024-05-08

## 2024-04-09 RX ORDER — METHOCARBAMOL 500 MG/1
1 TABLET, FILM COATED ORAL
Qty: 15 | Refills: 0
Start: 2024-04-09 | End: 2024-04-13

## 2024-04-09 RX ORDER — LANOLIN ALCOHOL/MO/W.PET/CERES
1 CREAM (GRAM) TOPICAL
Qty: 0 | Refills: 0 | DISCHARGE
Start: 2024-04-09

## 2024-04-09 RX ORDER — METFORMIN HYDROCHLORIDE 850 MG/1
2 TABLET ORAL
Refills: 0 | DISCHARGE

## 2024-04-09 RX ORDER — ASPIRIN/CALCIUM CARB/MAGNESIUM 324 MG
1 TABLET ORAL
Qty: 0 | Refills: 0 | DISCHARGE
Start: 2024-04-09

## 2024-04-09 RX ORDER — ACETAMINOPHEN 500 MG
2 TABLET ORAL
Qty: 0 | Refills: 0 | DISCHARGE
Start: 2024-04-09

## 2024-04-09 RX ORDER — METFORMIN HYDROCHLORIDE 850 MG/1
2 TABLET ORAL
Qty: 0 | Refills: 0
Start: 2024-04-09

## 2024-04-09 RX ORDER — TICAGRELOR 90 MG/1
1 TABLET ORAL
Qty: 60 | Refills: 0
Start: 2024-04-09 | End: 2024-05-08

## 2024-04-09 RX ADMIN — Medication 12.5 MILLIGRAM(S): at 06:39

## 2024-04-09 RX ADMIN — TICAGRELOR 90 MILLIGRAM(S): 90 TABLET ORAL at 06:40

## 2024-04-09 RX ADMIN — METHOCARBAMOL 1500 MILLIGRAM(S): 500 TABLET, FILM COATED ORAL at 06:39

## 2024-04-09 RX ADMIN — Medication 81 MILLIGRAM(S): at 10:30

## 2024-04-09 NOTE — DISCHARGE NOTE NURSING/CASE MANAGEMENT/SOCIAL WORK - PATIENT PORTAL LINK FT
You can access the FollowMyHealth Patient Portal offered by [facility] by registering at the following website: [email or web address]. By joining [employer]’s FollowMyHealth portal, you will also be able to view your health information using other applications (apps) compatible with our system.

## 2024-04-09 NOTE — DISCHARGE NOTE NURSING/CASE MANAGEMENT/SOCIAL WORK - NSDCPEFALRISK_GEN_ALL_CORE
For information on Fall & Injury Prevention, visit: [email or web address] OR  [email or web address] OR  https://www.cdc.gov/steadi/patient.html

## 2024-04-09 NOTE — CHART NOTE - NSCHARTNOTEFT_GEN_A_CORE
Patient seen at bedside this AM. RRA access site with no bleeding, hematoma. Patient's labs HDS. Patient educated regarding strict adherence to DAPT therapy with ASA/Brilinta. Patient to follow up with Dr. Arreguin within a week.

## 2024-04-09 NOTE — PROGRESS NOTE ADULT - SUBJECTIVE AND OBJECTIVE BOX
[place] [facility] - [facility], THE HEART CENTER                                   [address] [place] [address]                                                      PHONE: [phone]                                                         FAX: [phone]  [email or web address]  ---------------------------------------------------------------------------------------------------------------------------------    Overnight events/patient complaints: No complaints overnight. Patient states her jaw pain is now resolved.      penicillins (Anaphylaxis; Rash)    MEDICATIONS  (STANDING):  aspirin  chewable 81 milliGRAM(s) Oral daily  atorvastatin 10 milliGRAM(s) Oral daily  dextrose 10% Bolus 125 milliLiter(s) IV Bolus once  dextrose 5%. 1000 milliLiter(s) (100 mL/Hr) IV Continuous <Continuous>  dextrose 5%. 1000 milliLiter(s) (50 mL/Hr) IV Continuous <Continuous>  dextrose 50% Injectable 12.5 Gram(s) IV Push once  dextrose 50% Injectable 25 Gram(s) IV Push once  enoxaparin Injectable 40 milliGRAM(s) SubCutaneous every 24 hours  glucagon  Injectable 1 milliGRAM(s) IntraMuscular once  insulin lispro (ADMELOG) corrective regimen sliding scale   SubCutaneous at bedtime  insulin lispro (ADMELOG) corrective regimen sliding scale   SubCutaneous three times a day before meals  methocarbamol 1500 milliGRAM(s) Oral every 8 hours  metoprolol succinate ER 12.5 milliGRAM(s) Oral daily  ticagrelor 90 milliGRAM(s) Oral every 12 hours    MEDICATIONS  (PRN):  acetaminophen     Tablet .. 650 milliGRAM(s) Oral every 6 hours PRN Temp greater or equal to 38C (100.4F), Mild Pain (1 - 3)  aluminum hydroxide/magnesium hydroxide/simethicone Suspension 30 milliLiter(s) Oral every 4 hours PRN Dyspepsia  dextrose Oral Gel 15 Gram(s) Oral once PRN Blood Glucose LESS THAN 70 milliGRAM(s)/deciliter  melatonin 3 milliGRAM(s) Oral at bedtime PRN Insomnia  ondansetron Injectable 4 milliGRAM(s) IV Push every 8 hours PRN Nausea and/or Vomiting      Vital Signs Last 24 Hrs  T(C): 36.6 (09 Apr 2024 08:26), Max: 36.9 (08 Apr 2024 11:38)  T(F): 97.8 (09 Apr 2024 08:26), Max: 98.5 (08 Apr 2024 11:38)  HR: 79 (09 Apr 2024 08:26) (74 - 97)  BP: 115/74 (09 Apr 2024 08:26) (105/77 - 146/80)  BP(mean): --  RR: 18 (09 Apr 2024 08:26) (16 - 19)  SpO2: 94% (09 Apr 2024 08:26) (94% - 99%)    Parameters below as of 09 Apr 2024 08:26  Patient On (Oxygen Delivery Method): room air      ICU Vital Signs Last 24 Hrs  KORY VILLAGRAN  I&O's Detail    Drug Dosing Weight  KORY VILLAGRAN      PHYSICAL EXAM:  General: NAD  HEENT: Head; normocephalic, atraumatic.  Eyes: EOMI, conjunctiva normal  Neck: Supple, no JVD noted  CARDIOVASCULAR: RRR, S1 S2, No murmurs or gallops  LUNGS: Clear to auscultation b/l, No rales, rhonchi or wheeze, normal inspiratory effort  ABDOMEN: Soft, nontender, non-distended, +bowel sounds  EXTREMITIES: No edema b/l, no cyanosis   SKIN: warm and dry  NEURO: Alert/oriented x 3  PSYCH: normal affect.        LABS:                        12.5   9.86  )-----------( 287      ( 09 Apr 2024 05:47 )             38.5     04-09    141  |  103  |  11.2  ----------------------------<  139<H>  4.5   |  25.0  |  0.87    Ca    9.5      09 Apr 2024 05:47    TPro  6.8  /  Alb  4.1  /  TBili  0.3<L>  /  DBili  x   /  AST  19  /  ALT  30  /  AlkPhos  81  04-08    KORY VILLAGRAN        Urinalysis Basic - ( 09 Apr 2024 05:47 )    Color: x / Appearance: x / SG: x / pH: x  Gluc: 139 mg/dL / Ketone: x  / Bili: x / Urobili: x   Blood: x / Protein: x / Nitrite: x   Leuk Esterase: x / RBC: x / WBC x   Sq Epi: x / Non Sq Epi: x / Bacteria: x        RADIOLOGY & ADDITIONAL STUDIES:    INTERPRETATION OF TELEMETRY (personally reviewed): No cardiac events.     ASSESSMENT AND PLAN:  54y Female with h/o DM, HLD, obesity, nonsmoker, and FH of CAD on father's side who presents c/o 2 weeks of intermittent atypical sx's of CP, HA, and radiating jaw pain s/p PCI to the Diag 1 artery.     CAD  -s/p PCI to D1  -c/w aspirin 81mg daily  -c/w brillinta 90mg q12h  -c/w atorvastatin 10mg daily  -c/w toprol 12.5mg daily    Patient to follow closely in the office. Patient can be discharged from a cardiac perspective.    Thank you for letting [facility] to assist in the management of this patient. Please call with any questions.

## 2024-04-10 ENCOUNTER — EMERGENCY (EMERGENCY)
Facility: HOSPITAL | Age: 54
LOS: 1 days | Discharge: HOME | End: 2024-04-10
Attending: EMERGENCY MEDICINE
Payer: COMMERCIAL

## 2024-04-10 VITALS
OXYGEN SATURATION: 98 % | RESPIRATION RATE: 20 BRPM | DIASTOLIC BLOOD PRESSURE: 96 MMHG | HEIGHT: 64 IN | SYSTOLIC BLOOD PRESSURE: 154 MMHG | WEIGHT: 211.64 LBS | HEART RATE: 95 BPM | TEMPERATURE: 98 F

## 2024-04-10 VITALS
SYSTOLIC BLOOD PRESSURE: 125 MMHG | RESPIRATION RATE: 19 BRPM | OXYGEN SATURATION: 99 % | TEMPERATURE: 98 F | DIASTOLIC BLOOD PRESSURE: 77 MMHG | HEART RATE: 88 BPM

## 2024-04-10 DIAGNOSIS — Z78.9 OTHER SPECIFIED HEALTH STATUS: Chronic | ICD-10-CM

## 2024-04-10 PROBLEM — E78.5 HYPERLIPIDEMIA, UNSPECIFIED: Chronic | Status: ACTIVE | Noted: 2024-04-05

## 2024-04-10 PROBLEM — E11.9 TYPE 2 DIABETES MELLITUS WITHOUT COMPLICATIONS: Chronic | Status: ACTIVE | Noted: 2024-04-05

## 2024-04-10 PROBLEM — F32.9 MAJOR DEPRESSIVE DISORDER, SINGLE EPISODE, UNSPECIFIED: Chronic | Status: ACTIVE | Noted: 2024-04-05

## 2024-04-10 LAB
ALBUMIN SERPL ELPH-MCNC: 4.4 G/DL — SIGNIFICANT CHANGE UP (ref 3.3–5.2)
ALP SERPL-CCNC: 94 U/L — SIGNIFICANT CHANGE UP (ref 40–120)
ALT FLD-CCNC: 32 U/L — SIGNIFICANT CHANGE UP
ANION GAP SERPL CALC-SCNC: 16 MMOL/L — SIGNIFICANT CHANGE UP (ref 5–17)
APTT BLD: 38.9 SEC — HIGH (ref 24.5–35.6)
AST SERPL-CCNC: 21 U/L — SIGNIFICANT CHANGE UP
BASOPHILS # BLD AUTO: 0.05 K/UL — SIGNIFICANT CHANGE UP (ref 0–0.2)
BASOPHILS NFR BLD AUTO: 0.4 % — SIGNIFICANT CHANGE UP (ref 0–2)
BILIRUB SERPL-MCNC: 0.3 MG/DL — LOW (ref 0.4–2)
BUN SERPL-MCNC: 13.3 MG/DL — SIGNIFICANT CHANGE UP (ref 8–20)
CALCIUM SERPL-MCNC: 10 MG/DL — SIGNIFICANT CHANGE UP (ref 8.4–10.5)
CHLORIDE SERPL-SCNC: 102 MMOL/L — SIGNIFICANT CHANGE UP (ref 96–108)
CK SERPL-CCNC: 88 U/L — SIGNIFICANT CHANGE UP (ref 25–170)
CO2 SERPL-SCNC: 24 MMOL/L — SIGNIFICANT CHANGE UP (ref 22–29)
CREAT SERPL-MCNC: 0.93 MG/DL — SIGNIFICANT CHANGE UP (ref 0.5–1.3)
EGFR: 72 ML/MIN/1.73M2 — SIGNIFICANT CHANGE UP
EOSINOPHIL # BLD AUTO: 0.15 K/UL — SIGNIFICANT CHANGE UP (ref 0–0.5)
EOSINOPHIL NFR BLD AUTO: 1.1 % — SIGNIFICANT CHANGE UP (ref 0–6)
GLUCOSE SERPL-MCNC: 122 MG/DL — HIGH (ref 70–99)
HCT VFR BLD CALC: 39.7 % — SIGNIFICANT CHANGE UP (ref 34.5–45)
HGB BLD-MCNC: 13 G/DL — SIGNIFICANT CHANGE UP (ref 11.5–15.5)
IMM GRANULOCYTES NFR BLD AUTO: 0.4 % — SIGNIFICANT CHANGE UP (ref 0–0.9)
INR BLD: 1.07 RATIO — SIGNIFICANT CHANGE UP (ref 0.85–1.18)
LYMPHOCYTES # BLD AUTO: 24.6 % — SIGNIFICANT CHANGE UP (ref 13–44)
LYMPHOCYTES # BLD AUTO: 3.37 K/UL — HIGH (ref 1–3.3)
MCHC RBC-ENTMCNC: 27.9 PG — SIGNIFICANT CHANGE UP (ref 27–34)
MCHC RBC-ENTMCNC: 32.7 GM/DL — SIGNIFICANT CHANGE UP (ref 32–36)
MCV RBC AUTO: 85.2 FL — SIGNIFICANT CHANGE UP (ref 80–100)
MONOCYTES # BLD AUTO: 0.87 K/UL — SIGNIFICANT CHANGE UP (ref 0–0.9)
MONOCYTES NFR BLD AUTO: 6.3 % — SIGNIFICANT CHANGE UP (ref 2–14)
NEUTROPHILS # BLD AUTO: 9.22 K/UL — HIGH (ref 1.8–7.4)
NEUTROPHILS NFR BLD AUTO: 67.2 % — SIGNIFICANT CHANGE UP (ref 43–77)
PLATELET # BLD AUTO: 351 K/UL — SIGNIFICANT CHANGE UP (ref 150–400)
POTASSIUM SERPL-MCNC: 4.3 MMOL/L — SIGNIFICANT CHANGE UP (ref 3.5–5.3)
POTASSIUM SERPL-SCNC: 4.3 MMOL/L — SIGNIFICANT CHANGE UP (ref 3.5–5.3)
PROT SERPL-MCNC: 7.5 G/DL — SIGNIFICANT CHANGE UP (ref 6.6–8.7)
PROTHROM AB SERPL-ACNC: 11.9 SEC — SIGNIFICANT CHANGE UP (ref 9.5–13)
RBC # BLD: 4.66 M/UL — SIGNIFICANT CHANGE UP (ref 3.8–5.2)
RBC # FLD: 13.4 % — SIGNIFICANT CHANGE UP (ref 10.3–14.5)
SODIUM SERPL-SCNC: 142 MMOL/L — SIGNIFICANT CHANGE UP (ref 135–145)
TROPONIN T, HIGH SENSITIVITY RESULT: 20 NG/L — SIGNIFICANT CHANGE UP (ref 0–51)
TROPONIN T, HIGH SENSITIVITY RESULT: 21 NG/L — SIGNIFICANT CHANGE UP (ref 0–51)
WBC # BLD: 13.72 K/UL — HIGH (ref 3.8–10.5)
WBC # FLD AUTO: 13.72 K/UL — HIGH (ref 3.8–10.5)

## 2024-04-10 PROCEDURE — 82550 ASSAY OF CK (CPK): CPT

## 2024-04-10 PROCEDURE — 85025 COMPLETE CBC W/AUTO DIFF WBC: CPT

## 2024-04-10 PROCEDURE — 93005 ELECTROCARDIOGRAM TRACING: CPT

## 2024-04-10 PROCEDURE — 71046 X-RAY EXAM CHEST 2 VIEWS: CPT

## 2024-04-10 PROCEDURE — 99285 EMERGENCY DEPT VISIT HI MDM: CPT | Mod: 25

## 2024-04-10 PROCEDURE — 85730 THROMBOPLASTIN TIME PARTIAL: CPT

## 2024-04-10 PROCEDURE — 93010 ELECTROCARDIOGRAM REPORT: CPT

## 2024-04-10 PROCEDURE — 84484 ASSAY OF TROPONIN QUANT: CPT

## 2024-04-10 PROCEDURE — 85610 PROTHROMBIN TIME: CPT

## 2024-04-10 PROCEDURE — 80053 COMPREHEN METABOLIC PANEL: CPT

## 2024-04-10 PROCEDURE — 36415 COLL VENOUS BLD VENIPUNCTURE: CPT

## 2024-04-10 PROCEDURE — 99285 EMERGENCY DEPT VISIT HI MDM: CPT

## 2024-04-10 PROCEDURE — 71046 X-RAY EXAM CHEST 2 VIEWS: CPT | Mod: 26

## 2024-04-10 NOTE — ED PROVIDER NOTE - PHYSICAL EXAMINATION
Gen: NAD, AOx3  Head: NCAT  HEENT: oral mucosa moist, normal conjunctiva, oropharynx clear without exudate or erythema  Lung: CTAB, no respiratory distress, no wheezing, rales, rhonchi  CV: normal s1/s2, rrr, no murmurs, Normal perfusion, pulses 2+ throughout  Abd: soft, NTND  MSK: No edema, no visible deformities, full range of motion in all 4 extremities  Neuro: CN II-XII grossly intact, No focal neurologic deficits  Skin: No rash, ecchymosis present over right wrist, pulses intact throughout  Psych: normal affect

## 2024-04-10 NOTE — ED ADULT TRIAGE NOTE - GLASGOW COMA SCALE: EYE OPENING, MLM
[Nausea: Grade 1 - Loss of appetite without alteration in eating habits] : Nausea: Grade 1 - Loss of appetite without alteration in eating habits [Fatigue: Grade 1 - Fatigue relieved by rest] : Fatigue: Grade 1 - Fatigue relieved by rest [Skin Hyperpigmentation: Grade 0] : Skin Hyperpigmentation: Grade 0 [Dermatitis Radiation: Grade 1 - Faint erythema or dry desquamation] : Dermatitis Radiation: Grade 1 - Faint erythema or dry desquamation [Localized Edema: Grade 0] : Localized Edema: Grade 0  [Breast Pain: Grade 0] : Breast Pain: Grade 0 [Pruritus: Grade 0] : Pruritus: Grade 0 (E4) spontaneous

## 2024-04-10 NOTE — ED ADULT NURSE NOTE - NSFALLUNIVINTERV_ED_ALL_ED
Bed/Stretcher in lowest position, wheels locked, appropriate side rails in place/Call bell, personal items and telephone in reach/Instruct patient to call for assistance before getting out of bed/chair/stretcher/Non-slip footwear applied when patient is off stretcher/[place] to call system/Physically safe environment - no spills, clutter or unnecessary equipment/Purposeful proactive rounding/Room/bathroom lighting operational, light cord in reach

## 2024-04-10 NOTE — ED ADULT NURSE REASSESSMENT NOTE - NS ED NURSE REASSESS COMMENT FT1
patient resting on stretcher quietly, non acute distress noted at this time and family at bedside. patient reports improvement with pain, pt pending cxr and Cards.

## 2024-04-10 NOTE — PROGRESS NOTE ADULT - SUBJECTIVE AND OBJECTIVE BOX
[place] [facility], THE HEART CENTER                                   [address] [place] [address]                                                      PHONE: [phone]                                                         FAX: [phone]  [email or web address]  ---------------------------------------------------------------------------------------------------------------------------------    Overnight events/patient complaints: Patient represented to the ER with chest pain.       penicillins (Anaphylaxis; Rash)    MEDICATIONS  (STANDING):    MEDICATIONS  (PRN):      Vital Signs Last 24 Hrs  T(C): 36.8 (10 Apr 2024 20:19), Max: 36.8 (10 Apr 2024 20:19)  T(F): 98.2 (10 Apr 2024 20:19), Max: 98.2 (10 Apr 2024 20:19)  HR: 88 (10 Apr 2024 20:19) (88 - 95)  BP: 125/77 (10 Apr 2024 20:19) (125/77 - 154/96)  BP(mean): --  RR: 19 (10 Apr 2024 20:19) (19 - 20)  SpO2: 99% (10 Apr 2024 20:19) (98% - 99%)    Parameters below as of 10 Apr 2024 20:19  Patient On (Oxygen Delivery Method): room air      ICU Vital Signs Last 24 Hrs  KORY VILLAGRAN  I&O's Detail    Drug Dosing Weight  KORY VILLAGRAN      PHYSICAL EXAM:  General: NAD  HEENT: Head; normocephalic, atraumatic.  Eyes: EOMI, conjunctiva normal  Neck: Supple, no JVD noted  CARDIOVASCULAR: RRR, S1 S2, No murmurs or gallops  LUNGS: Clear to auscultation b/l, No rales, rhonchi or wheeze, normal inspiratory effort  ABDOMEN: Soft, nontender, non-distended, +bowel sounds  EXTREMITIES: No edema b/l, no cyanosis   SKIN: warm and dry  NEURO: Alert/oriented x 3  PSYCH: normal affect.        LABS:                        13.0   13.72 )-----------( 351      ( 10 Apr 2024 18:49 )             39.7     04-10    142  |  102  |  13.3  ----------------------------<  122<H>  4.3   |  24.0  |  0.93    Ca    10.0      10 Apr 2024 18:49    TPro  7.5  /  Alb  4.4  /  TBili  0.3<L>  /  DBili  x   /  AST  21  /  ALT  32  /  AlkPhos  94  04-10    KORYSAVANA VILLAGRAN  CARDIAC MARKERS ( 10 Apr 2024 18:49 )  x     / x     / 88 U/L / x     / x          PT/INR - ( 10 Apr 2024 18:49 )   PT: 11.9 sec;   INR: 1.07 ratio         PTT - ( 10 Apr 2024 18:49 )  PTT:38.9 sec  Urinalysis Basic - ( 10 Apr 2024 18:49 )    Color: x / Appearance: x / SG: x / pH: x  Gluc: 122 mg/dL / Ketone: x  / Bili: x / Urobili: x   Blood: x / Protein: x / Nitrite: x   Leuk Esterase: x / RBC: x / WBC x   Sq Epi: x / Non Sq Epi: x / Bacteria: x        ASSESSMENT AND PLAN:  54y Female with h/o CAD s/p PCI to the D1 (4/8/24), DM, HLD, obesity, nonsmoker, and FH of CAD on father's side who presents with chest pain.    CAD/Chest Pain  -s/p recent PCI to D1  -ECG without acute ischemic changes  -troponin negative x1  -trend troponin one more time. If stays negative, can discharge from a cardiac perspective  -c/w aspirin 81mg daily  -c/w brillinta 90mg q12h  -c/w atorvastatin 10mg daily  -c/w toprol 12.5mg daily      Thank you for letting [facility] to assist in the management of this patient. Please call with any questions.                 [place] [facility] - [facility], THE HEART CENTER                                   [address] [place] [address]                                                      PHONE: [phone]                                                         FAX: [phone]  [email or web address]  ---------------------------------------------------------------------------------------------------------------------------------    Overnight events/patient complaints: Patient represented to the ER with chest discomfort. Patient states she was home and has been experiencing on and off discomfort today. Patient called the office and was referred to the ER for further evaluation.       penicillins (Anaphylaxis; Rash)    MEDICATIONS  (STANDING):    MEDICATIONS  (PRN):      Vital Signs Last 24 Hrs  T(C): 36.8 (10 Apr 2024 20:19), Max: 36.8 (10 Apr 2024 20:19)  T(F): 98.2 (10 Apr 2024 20:19), Max: 98.2 (10 Apr 2024 20:19)  HR: 88 (10 Apr 2024 20:19) (88 - 95)  BP: 125/77 (10 Apr 2024 20:19) (125/77 - 154/96)  BP(mean): --  RR: 19 (10 Apr 2024 20:19) (19 - 20)  SpO2: 99% (10 Apr 2024 20:19) (98% - 99%)    Parameters below as of 10 Apr 2024 20:19  Patient On (Oxygen Delivery Method): room air      ICU Vital Signs Last 24 Hrs  KORY VILLAGRAN  I&O's Detail    Drug Dosing Weight  KORY VILLAGRAN      PHYSICAL EXAM:  General: NAD  HEENT: Head; normocephalic, atraumatic.  Eyes: EOMI, conjunctiva normal  Neck: Supple, no JVD noted  CARDIOVASCULAR: RRR, S1 S2, No murmurs or gallops  LUNGS: Clear to auscultation b/l, No rales, rhonchi or wheeze, normal inspiratory effort  ABDOMEN: Soft, nontender, non-distended, +bowel sounds  EXTREMITIES: No edema b/l, no cyanosis   SKIN: warm and dry  NEURO: Alert/oriented x 3  PSYCH: normal affect.        LABS:                        13.0   13.72 )-----------( 351      ( 10 Apr 2024 18:49 )             39.7     04-10    142  |  102  |  13.3  ----------------------------<  122<H>  4.3   |  24.0  |  0.93    Ca    10.0      10 Apr 2024 18:49    TPro  7.5  /  Alb  4.4  /  TBili  0.3<L>  /  DBili  x   /  AST  21  /  ALT  32  /  AlkPhos  94  04-10    KORY VILLAGRAN  CARDIAC MARKERS ( 10 Apr 2024 18:49 )  x     / x     / 88 U/L / x     / x          PT/INR - ( 10 Apr 2024 18:49 )   PT: 11.9 sec;   INR: 1.07 ratio         PTT - ( 10 Apr 2024 18:49 )  PTT:38.9 sec  Urinalysis Basic - ( 10 Apr 2024 18:49 )    Color: x / Appearance: x / SG: x / pH: x  Gluc: 122 mg/dL / Ketone: x  / Bili: x / Urobili: x   Blood: x / Protein: x / Nitrite: x   Leuk Esterase: x / RBC: x / WBC x   Sq Epi: x / Non Sq Epi: x / Bacteria: x        ASSESSMENT AND PLAN:  54y Female with h/o CAD s/p PCI to the D1 (4/8/24), DM, HLD, obesity, nonsmoker, and FH of CAD on father's side who presents with chest pain.    CAD/Chest Pain  -s/p recent PCI to D1  -ECG without acute ischemic changes  -troponin negative x1  -trend troponin one more time. If stays negative, can discharge from a cardiac perspective  -c/w aspirin 81mg daily  -c/w brillinta 90mg q12h  -c/w atorvastatin 10mg daily  -c/w toprol 12.5mg daily      Thank you for letting [facility] to assist in the management of this patient. Please call with any questions.

## 2024-04-10 NOTE — ED PROVIDER NOTE - OBJECTIVE STATEMENT
54-year-old female with  past medical history diabetes, hyperlipidemia, CAD status post stent placed to diagonal 2 days ago by Dr. Arreguin  Presenting with chest tightness.  Chest tightness started this morning, constant, located midsternal region and radiating to left chest wall.   No associated shortness of breath, no nausea or vomiting, no diaphoresis.   Patient states that she called her cardiologist,  who told her to come to the ED.  Patient states that she has been compliant with all medications since discharge yesterday.

## 2024-04-10 NOTE — ED ADULT NURSE NOTE - OBJECTIVE STATEMENT
A&Ox4, RR even and unlabored. Skin is warm and dry. Pt c/O chest tightness that began this afternoon while sitting at her desk. Denies SOB or radiating pain. PT had cardiac cath done on Monday. Was hospitalized last week for headache and neck pain radiating to chest. On CM. IV placed and labs sent.

## 2024-04-10 NOTE — ED PROVIDER NOTE - NSFOLLOWUPINSTRUCTIONS_ED_ALL_ED_FT
1. Follow up with your cardiologist within 2-3days for reevaluation.  2.  Return to the Emergency Department immediately for any worsening, progressive or concerning symptoms.     Chest Pain    Chest pain can be caused by many different conditions which may or may not be dangerous. Causes include heartburn, lung infections, heart attack, blood clot in lungs, skin infections, strain or damage to muscle, cartilage, or bones, etc. In addition to a history and physical examination, an electrocardiogram (ECG) or other lab tests may have been performed to determine the cause of your chest pain. Follow up with your primary care provider or with a cardiologist as instructed.     SEEK IMMEDIATE MEDICAL CARE IF YOU HAVE ANY OF THE FOLLOWING SYMPTOMS: worsening chest pain, coughing up blood, unexplained back/neck/jaw pain, severe abdominal pain, dizziness or lightheadedness, fainting, shortness of breath, sweaty or clammy skin, vomiting, or racing heart beat. These symptoms may represent a serious problem that is an emergency. Do not wait to see if the symptoms will go away. Get medical help right away. Call 911 and do not drive yourself to the hospital.

## 2024-04-10 NOTE — ED PROVIDER NOTE - CLINICAL SUMMARY MEDICAL DECISION MAKING FREE TEXT BOX
patient presenting with chest pain status postcardiac catheterization 2 days ago.  Patient had EKG performed, which showed normal sinus rhythm, no ST elevations or depressions, rate of 90, no signs of ischemia.  Her physical exam is reassuring.  Plan to obtain labs including troponin and CK, discussed with cardiology, chest x-ray, and reassess. patient presenting with chest pain status postcardiac catheterization 2 days ago.  Patient had EKG performed, which showed normal sinus rhythm, no ST elevations or depressions, rate of 90, no signs of ischemia.  Her physical exam is reassuring.  Plan to obtain labs including troponin and CK, discussed with cardiology, chest x-ray, and reassess.    Troponin trended, 20-21.  No significant change.  Not significantly elevated.  Remainder of labs are nonactionable.  Patient seen by cardiology, Dr. Barraza, who I spoke with.  Patient can be discharged, follow-up with cardiologist.

## 2024-04-10 NOTE — ED ADULT TRIAGE NOTE - SOURCE OF INFORMATION
Jarrett Moore is a 59 year old male presenting for   Chief Complaint   Patient presents with   • Office Visit     vertigo   • Ear Problem     Ringing month an half, mostly in left however will also intermittently occur in the right.       Denies Latex allergy or sensitivity.    Medication verified, no changes  Refills needed today: No    Health Maintenance Due   Topic Date Due   • Shingles Vaccine (1 of 2) Never done       Patient is due for topics as listed above but is not proceeding with Immunization(s) Shingles at this time.           Last lab results:   No results found for: HGBA1C  Cholesterol (mg/dL)   Date Value   01/10/2023 171     HDL (mg/dL)   Date Value   01/10/2023 45     Triglycerides (mg/dL)   Date Value   01/10/2023 250 (H)     LDL (mg/dL)   Date Value   01/10/2023 76     No results found for: URMIC, UCR, MALBCR  Lab Results   Component Value Date/Time    IFOB NEGATIVE 05/10/2013 04:54 PM                  Depression Screening:  Recent Review Flowsheet Data     Date 1/23/2023    Adult PHQ 2 Score 0    Adult PHQ 2 Interpretation No further screening needed    Little interest or pleasure in activity? Not at all    Feeling down, depressed or hopeless? Not at all           Advance Directives:  discussed and patient declined   Patient

## 2024-04-10 NOTE — ED PROVIDER NOTE - PATIENT PORTAL LINK FT
You can access the FollowMyHealth Patient Portal offered by [facility] by registering at the following website: [email or web address]. By joining [employer]’s FollowMyHealth portal, you will also be able to view your health information using other applications (apps) compatible with our system.

## 2024-04-19 ENCOUNTER — APPOINTMENT (OUTPATIENT)
Dept: DERMATOLOGY | Facility: CLINIC | Age: 54
End: 2024-04-19

## 2024-04-25 ENCOUNTER — APPOINTMENT (OUTPATIENT)
Dept: FAMILY MEDICINE | Facility: CLINIC | Age: 54
End: 2024-04-25

## 2024-04-25 ENCOUNTER — APPOINTMENT (OUTPATIENT)
Dept: INTERNAL MEDICINE | Facility: CLINIC | Age: 54
End: 2024-04-25
Payer: COMMERCIAL

## 2024-04-25 VITALS
WEIGHT: 212 LBS | HEIGHT: 64 IN | SYSTOLIC BLOOD PRESSURE: 126 MMHG | DIASTOLIC BLOOD PRESSURE: 70 MMHG | HEART RATE: 97 BPM | BODY MASS INDEX: 36.19 KG/M2 | OXYGEN SATURATION: 97 %

## 2024-04-25 LAB — GLUCOSE BLDC GLUCOMTR-MCNC: 125

## 2024-04-25 PROCEDURE — 36415 COLL VENOUS BLD VENIPUNCTURE: CPT

## 2024-04-25 PROCEDURE — 82962 GLUCOSE BLOOD TEST: CPT

## 2024-04-25 PROCEDURE — 99214 OFFICE O/P EST MOD 30 MIN: CPT

## 2024-04-25 PROCEDURE — 81003 URINALYSIS AUTO W/O SCOPE: CPT | Mod: QW

## 2024-04-25 PROCEDURE — G2211 COMPLEX E/M VISIT ADD ON: CPT

## 2024-04-25 RX ORDER — LIRAGLUTIDE 6 MG/ML
18 INJECTION SUBCUTANEOUS
Qty: 1 | Refills: 2 | Status: DISCONTINUED | COMMUNITY
Start: 2023-08-25 | End: 2024-04-25

## 2024-04-25 RX ORDER — DESVENLAFAXINE 25 MG/1
25 TABLET, EXTENDED RELEASE ORAL DAILY
Refills: 0 | Status: ACTIVE | COMMUNITY
Start: 2021-03-11

## 2024-04-25 RX ORDER — BLOOD-GLUCOSE SENSOR
EACH MISCELLANEOUS
Qty: 12 | Refills: 0 | Status: ACTIVE | COMMUNITY
Start: 2024-01-04 | End: 1900-01-01

## 2024-04-25 RX ORDER — BLOOD-GLUCOSE,RECEIVER,CONT
EACH MISCELLANEOUS
Qty: 1 | Refills: 0 | Status: ACTIVE | COMMUNITY
Start: 2024-01-04 | End: 1900-01-01

## 2024-04-25 NOTE — REVIEW OF SYSTEMS
[Easy Bruising] : easy bruising [Negative] : Psychiatric [Easy Bleeding] : no easy bleeding [Swollen Glands] : no swollen glands

## 2024-04-25 NOTE — PLAN
[FreeTextEntry1] : s/p PCI: - Patient stable on brilinta, metoprolol tartrate, and aspirin 81 mg daily - Follows with cardiologist at [facility] - Cardiology note from [facility] says d/c on toprol 12.5 mg, but patient was prescribed metoprolol tartrate 12.5 mg once daily. Obtained records from cardiologist, patient's med from that visit listed metoprolol tartrate 12.5 mg. Spoke with patient and let her know. - Labs drawn and sent today  DM2: - Endo referral placed, patient has not seen endo in many years - Has been on mounjaro 5 mg since December and metformin ER 2000 mg daily. Patient does not want to go up on Mounjaro dose at this moment as she is making significant life changes. - Will recheck A1C today - Patient uses freestyle kriss, having issues with pharmacy providing dexcom. Dexcom ordered today. Patient aware that we do not have the program to automatically upload Dexcom data and she would need to see endocrinology for this feature. - Task sent to enroll patient in [employer] 4: Weight Management program  f/u 3 months

## 2024-04-25 NOTE — HISTORY OF PRESENT ILLNESS
[FreeTextEntry1] : F/U medication renewal  [de-identified] : Patient is a 54-year-old female presenting for a follow-up visit. Patient had stent placed earlier this month, now on Brilinta, metoprolol, and aspirin. Has been compliant. D/C note from [facility] stated d/c on toprol 12.5 mg once daily, patient was actually discharged on metoprolol tartrate 12.5 mg once daily. Following up with cardiology June 5th. Reports that every once in a while she feels left-sided rib discomfort and chest discomfort. It usually lasts for a few seconds and then passes. It started around the time of the stent placement has been happening less and less over time. Has drastically improved diet, doesn't eat nearly as much sugar. Patient has freestyle kriss, replaced this morning and had a little bit of bleeding which resolved. Patient reports she walks every night for 25-30 minutes. Is going to try to start walking twice a day or walking longer with a goal of 10,000 steps. Has pretty much cut out cold cuts.

## 2024-04-26 DIAGNOSIS — N39.0 URINARY TRACT INFECTION, SITE NOT SPECIFIED: ICD-10-CM

## 2024-04-26 LAB
25(OH)D3 SERPL-MCNC: 64.3 NG/ML
ALBUMIN SERPL ELPH-MCNC: 4.7 G/DL
ALP BLD-CCNC: 105 U/L
ALT SERPL-CCNC: 29 U/L
ANION GAP SERPL CALC-SCNC: 16 MMOL/L
APPEARANCE: ABNORMAL
AST SERPL-CCNC: 22 U/L
BACTERIA: ABNORMAL /HPF
BASOPHILS # BLD AUTO: 0.06 K/UL
BASOPHILS NFR BLD AUTO: 0.5 %
BILIRUB SERPL-MCNC: 0.2 MG/DL
BILIRUBIN URINE: NEGATIVE
BLOOD URINE: NEGATIVE
BUN SERPL-MCNC: 12 MG/DL
CALCIUM OXALATE CRYSTALS: PRESENT
CALCIUM SERPL-MCNC: 10 MG/DL
CAST: 2 /LPF
CHLORIDE SERPL-SCNC: 98 MMOL/L
CHOLEST SERPL-MCNC: 129 MG/DL
CO2 SERPL-SCNC: 23 MMOL/L
COLOR: YELLOW
CREAT SERPL-MCNC: 0.91 MG/DL
EGFR: 74 ML/MIN/1.73M2
EOSINOPHIL # BLD AUTO: 0.15 K/UL
EOSINOPHIL NFR BLD AUTO: 1.2 %
EPITHELIAL CELLS: 10 /HPF
ESTIMATED AVERAGE GLUCOSE: 154 MG/DL
FERRITIN SERPL-MCNC: 49 NG/ML
FOLATE SERPL-MCNC: 12.7 NG/ML
GLUCOSE QUALITATIVE U: NEGATIVE MG/DL
GLUCOSE SERPL-MCNC: 129 MG/DL
HBA1C MFR BLD HPLC: 7 %
HCT VFR BLD CALC: 41.1 %
HDLC SERPL-MCNC: 47 MG/DL
HGB BLD-MCNC: 12.9 G/DL
HYALINE CASTS: PRESENT
IMM GRANULOCYTES NFR BLD AUTO: 0.4 %
KETONES URINE: ABNORMAL MG/DL
LDLC SERPL CALC-MCNC: 63 MG/DL
LEUKOCYTE ESTERASE URINE: ABNORMAL
LYMPHOCYTES # BLD AUTO: 2.91 K/UL
LYMPHOCYTES NFR BLD AUTO: 24.1 %
MAN DIFF?: NORMAL
MCHC RBC-ENTMCNC: 27.7 PG
MCHC RBC-ENTMCNC: 31.4 GM/DL
MCV RBC AUTO: 88.4 FL
MICROSCOPIC-UA: NORMAL
MONOCYTES # BLD AUTO: 0.64 K/UL
MONOCYTES NFR BLD AUTO: 5.3 %
NEUTROPHILS # BLD AUTO: 8.28 K/UL
NEUTROPHILS NFR BLD AUTO: 68.5 %
NITRITE URINE: NEGATIVE
NONHDLC SERPL-MCNC: 82 MG/DL
PH URINE: 5.5
PLATELET # BLD AUTO: 343 K/UL
POTASSIUM SERPL-SCNC: 4.8 MMOL/L
PROT SERPL-MCNC: 7.4 G/DL
PROTEIN URINE: NORMAL MG/DL
RBC # BLD: 4.65 M/UL
RBC # FLD: 13.5 %
RED BLOOD CELLS URINE: 2 /HPF
REVIEW: NORMAL
SODIUM SERPL-SCNC: 137 MMOL/L
SPECIFIC GRAVITY URINE: 1.03
TRIGL SERPL-MCNC: 101 MG/DL
TSH SERPL-ACNC: 0.71 UIU/ML
UROBILINOGEN URINE: 0.2 MG/DL
VIT B12 SERPL-MCNC: 472 PG/ML
WBC # FLD AUTO: 12.09 K/UL
WHITE BLOOD CELLS URINE: 10 /HPF

## 2024-04-30 DIAGNOSIS — I25.10 ATHEROSCLEROTIC HEART DISEASE OF NATIVE CORONARY ARTERY W/OUT ANGINA PECTORIS: ICD-10-CM

## 2024-04-30 RX ORDER — METOPROLOL TARTRATE 25 MG/1
25 TABLET, FILM COATED ORAL DAILY
Refills: 0 | Status: ACTIVE | COMMUNITY
Start: 2024-04-30

## 2024-04-30 RX ORDER — SULFAMETHOXAZOLE AND TRIMETHOPRIM 800; 160 MG/1; MG/1
800-160 TABLET ORAL TWICE DAILY
Qty: 6 | Refills: 0 | Status: DISCONTINUED | COMMUNITY
Start: 2024-04-26 | End: 2024-04-30

## 2024-04-30 RX ORDER — TICAGRELOR 90 MG/1
90 TABLET ORAL TWICE DAILY
Refills: 0 | Status: ACTIVE | COMMUNITY
Start: 2024-04-30

## 2024-05-15 DIAGNOSIS — R06.02 SHORTNESS OF BREATH: ICD-10-CM

## 2024-05-16 ENCOUNTER — APPOINTMENT (OUTPATIENT)
Dept: FAMILY MEDICINE | Facility: CLINIC | Age: 54
End: 2024-05-16

## 2024-06-12 ENCOUNTER — APPOINTMENT (OUTPATIENT)
Dept: INTERNAL MEDICINE | Facility: CLINIC | Age: 54
End: 2024-06-12
Payer: COMMERCIAL

## 2024-06-12 VITALS
HEIGHT: 64 IN | BODY MASS INDEX: 36.37 KG/M2 | HEART RATE: 88 BPM | WEIGHT: 213 LBS | DIASTOLIC BLOOD PRESSURE: 82 MMHG | OXYGEN SATURATION: 97 % | SYSTOLIC BLOOD PRESSURE: 130 MMHG

## 2024-06-12 DIAGNOSIS — E66.9 OBESITY, UNSPECIFIED: ICD-10-CM

## 2024-06-12 DIAGNOSIS — E55.9 VITAMIN D DEFICIENCY, UNSPECIFIED: ICD-10-CM

## 2024-06-12 DIAGNOSIS — D72.829 ELEVATED WHITE BLOOD CELL COUNT, UNSPECIFIED: ICD-10-CM

## 2024-06-12 DIAGNOSIS — E78.5 HYPERLIPIDEMIA, UNSPECIFIED: ICD-10-CM

## 2024-06-12 DIAGNOSIS — E11.9 TYPE 2 DIABETES MELLITUS W/OUT COMPLICATIONS: ICD-10-CM

## 2024-06-12 DIAGNOSIS — Z00.00 ENCOUNTER FOR GENERAL ADULT MEDICAL EXAMINATION W/OUT ABNORMAL FINDINGS: ICD-10-CM

## 2024-06-12 PROCEDURE — 99396 PREV VISIT EST AGE 40-64: CPT

## 2024-06-12 PROCEDURE — G2211 COMPLEX E/M VISIT ADD ON: CPT | Mod: NC,1L

## 2024-06-12 PROCEDURE — 36415 COLL VENOUS BLD VENIPUNCTURE: CPT

## 2024-06-12 PROCEDURE — 81003 URINALYSIS AUTO W/O SCOPE: CPT | Mod: QW

## 2024-06-12 RX ORDER — DULAGLUTIDE 1.5 MG/.5ML
1.5 INJECTION, SOLUTION SUBCUTANEOUS
Qty: 1 | Refills: 2 | Status: ACTIVE | COMMUNITY
Start: 2024-06-12

## 2024-06-12 RX ORDER — METHOCARBAMOL 500 MG/1
500 TABLET, FILM COATED ORAL AT BEDTIME
Qty: 14 | Refills: 0 | Status: DISCONTINUED | COMMUNITY
Start: 2024-03-28 | End: 2024-06-12

## 2024-06-12 RX ORDER — METFORMIN ER 500 MG 500 MG/1
500 TABLET ORAL
Qty: 240 | Refills: 1 | Status: ACTIVE | COMMUNITY
Start: 2018-11-07 | End: 1900-01-01

## 2024-06-12 RX ORDER — TIRZEPATIDE 5 MG/.5ML
5 INJECTION, SOLUTION SUBCUTANEOUS
Qty: 3 | Refills: 0 | Status: DISCONTINUED | COMMUNITY
Start: 2023-12-14 | End: 2024-06-12

## 2024-06-12 RX ORDER — ATORVASTATIN CALCIUM 40 MG/1
40 TABLET, FILM COATED ORAL
Refills: 0 | Status: ACTIVE | COMMUNITY
Start: 2020-06-29

## 2024-06-12 RX ORDER — VITAMIN K2 90 MCG
400 CAPSULE ORAL
Refills: 0 | Status: DISCONTINUED | COMMUNITY
Start: 2018-11-07 | End: 2024-06-12

## 2024-06-12 RX ORDER — IBUPROFEN 600 MG/1
600 TABLET, FILM COATED ORAL
Qty: 60 | Refills: 1 | Status: DISCONTINUED | COMMUNITY
Start: 2021-01-08 | End: 2024-06-12

## 2024-06-12 RX ORDER — NYSTATIN AND TRIAMCINOLONE ACETONIDE 100000; 1 MG/G; MG/G
100000-0.1 CREAM TOPICAL TWICE DAILY
Qty: 1 | Refills: 3 | Status: DISCONTINUED | COMMUNITY
Start: 2022-06-06 | End: 2024-06-12

## 2024-06-12 NOTE — REVIEW OF SYSTEMS
[Negative] : Psychiatric [Easy Bruising] : easy bruising [Easy Bleeding] : no easy bleeding [Swollen Glands] : no swollen glands [FreeTextEntry8] : no UTI symptoms

## 2024-06-12 NOTE — HEALTH RISK ASSESSMENT
[Good] : ~his/her~  mood as  good [No] : In the past 12 months have you used drugs other than those required for medical reasons? No [0] : 2) Feeling down, depressed, or hopeless: Not at all (0) [PHQ-2 Negative - No further assessment needed] : PHQ-2 Negative - No further assessment needed [Never] : Never [NO] : No [No Retinopathy] : No retinopathy [Patient reported mammogram was normal] : Patient reported mammogram was normal [Patient reported bone density results were normal] : Patient reported bone density results were normal [Patient reported colonoscopy was normal] : Patient reported colonoscopy was normal [HIV test declined] : HIV test declined [Hepatitis C test declined] : Hepatitis C test declined [With Family] : lives with family [Employed] : employed [Single] : single [Feels Safe at Home] : Feels safe at home [Fully functional (bathing, dressing, toileting, transferring, walking, feeding)] : Fully functional (bathing, dressing, toileting, transferring, walking, feeding) [Fully functional (using the telephone, shopping, preparing meals, housekeeping, doing laundry, using] : Fully functional and needs no help or supervision to perform IADLs (using the telephone, shopping, preparing meals, housekeeping, doing laundry, using transportation, managing medications and managing finances) [Reports normal functional visual acuity (ie: able to read med bottle)] : Reports normal functional visual acuity [Smoke Detector] : smoke detector [Carbon Monoxide Detector] : carbon monoxide detector [Seat Belt] :  uses seat belt [de-identified] : walking daily for 25 minutes since January and yoga [de-identified] : see HPI [[id number]] : 0 [EyeExamDate] : 11/2023 [Sexually Active] : not sexually active [High Risk Behavior] : no high risk behavior [Reports changes in hearing] : Reports no changes in hearing [Reports changes in vision] : Reports no changes in vision [Reports changes in dental health] : Reports no changes in dental health [Safety elements used in home] : no safety elements used in home [Sunscreen] : does not use sunscreen [Travel to Developing Areas] : does not  travel to developing areas [TB Exposure] : is not being exposed to tuberculosis [Caregiver Concerns] : does not have caregiver concerns [MammogramDate] : 11/23 [MammogramComments] : BR2 [BoneDensityDate] : 11/23 [ColonoscopyDate] : 11/23 [ColonoscopyComments] : f/u 5 years [HepatitisCDate] : 09/2014 [HepatitisCComments] : negative [FreeTextEntry2] : purchasing

## 2024-06-12 NOTE — HISTORY OF PRESENT ILLNESS
[FreeTextEntry1] : CPE [de-identified] : Patient is a 54-year-old female presenting for a CPE. Patient had shortness of breath/dyspnea on exertion, went to cardiologist and was switched from Brilinta to Plavix. Feels tremendously better. Shortness of breath has greatly improved. Otherwise feeling pretty good. She does yoga once a week, gets winded during yoga. She's worried about her blood sugar, sees her metformin in her stool sometimes. Usually takes it on an empty stomach. Feels like her blood sugars have been trending up. Fasting BG now is 150. BG spikes after lunch (highest 258). Will eat wraps for lunch, but also has twizzlers, chocolate, candy with lunch. Hx uterine fibroid, had recent TVUS through GYN.  Last mammo- 11/23 (BR2) Last pap- 11/23 (negative) Last DEXA- 11/23 (normal) Last colonoscopy- 11/23 (f/u 5 years) Last eye exam- 7 months ago Last dental exam- 03/2024 (cap placed) Last skin cancer screening- appt coming up in August Shingles vaccine- had 2 doses a few years ago

## 2024-06-12 NOTE — PLAN
[FreeTextEntry1] : DM2: - Extensive discussion regarding the importance of following low-carb diet, avoiding concentrated sweets such as candy/chocolate, as these are non-nutritious sources of carbs. Recommend complex carbohydrates such as quinoa, sweet potatoes, farro.  - Spikes in BG likely due to diet. Recommend maintaining current diabetes regimen with stricter adherence to low-carb diet. Will check A1C and make regimen adjustments if significantly elevated. - [facility] Nutrition Referral placed. Patient agreeable to working with a nutritionist - Recommend taking metformin with meals, rather than on empty stomach to try to improve digestion/absorption. - Recommend continuing walking and yoga, as exercise lowers blood sugars. - Discussed option of increasing Trulicity dose. Patient would prefer to remain on current dose, as she has several pens left  Leukocytosis: - Patient with leukocytosis on previous 2 lab results - If leukocytosis persists on today's labs, will send for CXR given recent shortness of breath to r/o PNA - If CXR negative, will refer to hematology. Patient aware and in agreement with plan  Hepatic steatosis: - Noted on previous CT from 10/23. Recommend weight loss to reduce risks associated with fatty liver - Will obtain records from GI Dr. Cardona for further workup  Cardiac: - Patient follows closely with [facility]. Will obtain records - Recommend continued f/u with cardiologist given recent PCI - Patient tolerating Plavix well. Discussed risks associated with blood thinners including easy bruising and risk of cerebral hemorrhage if she sustains a head injury. Recommend caution to avoid falls/injury.  HCM: - Labs collected and sent today. - Patient had EKG done at cardiology appt last week. Will obtain records. - Next mammo- 11/24 - Next pap- 11/24 - Next DEXA- 11/25 - Next colonoscopy- 11/28 - Next eye exam- next month - Next dental exam- 09/2024 - Next skin cancer screening- appt coming up in August - Patient received shingles vaccine x 2 already  f/u 1 month

## 2024-06-12 NOTE — PHYSICAL EXAM
[No Acute Distress] : no acute distress [Well Nourished] : well nourished [Well Developed] : well developed [Well-Appearing] : well-appearing [Normal Sclera/Conjunctiva] : normal sclera/conjunctiva [PERRL] : pupils equal round and reactive to light [EOMI] : extraocular movements intact [Normal Outer Ear/Nose] : the outer ears and nose were normal in appearance [Normal Oropharynx] : the oropharynx was normal [No JVD] : no jugular venous distention [No Lymphadenopathy] : no lymphadenopathy [Supple] : supple [Thyroid Normal, No Nodules] : the thyroid was normal and there were no nodules present [No Respiratory Distress] : no respiratory distress  [No Accessory Muscle Use] : no accessory muscle use [Clear to Auscultation] : lungs were clear to auscultation bilaterally [Normal Rate] : normal rate  [Regular Rhythm] : with a regular rhythm [Normal S1, S2] : normal S1 and S2 [No Murmur] : no murmur heard [No Carotid Bruits] : no carotid bruits [No Abdominal Bruit] : a ~M bruit was not heard ~T in the abdomen [No Varicosities] : no varicosities [Pedal Pulses Present] : the pedal pulses are present [No Edema] : there was no peripheral edema [No Palpable Aorta] : no palpable aorta [No Extremity Clubbing/Cyanosis] : no extremity clubbing/cyanosis [Soft] : abdomen soft [Non Tender] : non-tender [Non-distended] : non-distended [No Masses] : no abdominal mass palpated [No HSM] : no HSM [Normal Bowel Sounds] : normal bowel sounds [Normal Posterior Cervical Nodes] : no posterior cervical lymphadenopathy [Normal Anterior Cervical Nodes] : no anterior cervical lymphadenopathy [No CVA Tenderness] : no CVA  tenderness [No Spinal Tenderness] : no spinal tenderness [No Joint Swelling] : no joint swelling [Grossly Normal Strength/Tone] : grossly normal strength/tone [No Rash] : no rash [Coordination Grossly Intact] : coordination grossly intact [No Focal Deficits] : no focal deficits [Normal Gait] : normal gait [Deep Tendon Reflexes (DTR)] : deep tendon reflexes were 2+ and symmetric [Normal Affect] : the affect was normal [Normal Insight/Judgement] : insight and judgment were intact [de-identified] : scattered ecchymosis

## 2024-06-13 LAB
25(OH)D3 SERPL-MCNC: 64.3 NG/ML
ALBUMIN SERPL ELPH-MCNC: 4.8 G/DL
ALP BLD-CCNC: 106 U/L
ALT SERPL-CCNC: 26 U/L
ANION GAP SERPL CALC-SCNC: 16 MMOL/L
APPEARANCE: CLEAR
AST SERPL-CCNC: 19 U/L
BACTERIA: ABNORMAL /HPF
BASOPHILS # BLD AUTO: 0.05 K/UL
BASOPHILS NFR BLD AUTO: 0.5 %
BILIRUB SERPL-MCNC: 0.2 MG/DL
BILIRUBIN URINE: NEGATIVE
BLOOD URINE: NEGATIVE
BUN SERPL-MCNC: 12 MG/DL
CALCIUM SERPL-MCNC: 9.7 MG/DL
CAST: 2 /LPF
CHLORIDE SERPL-SCNC: 100 MMOL/L
CHOLEST SERPL-MCNC: 130 MG/DL
CO2 SERPL-SCNC: 22 MMOL/L
COLOR: YELLOW
CREAT SERPL-MCNC: 0.84 MG/DL
EGFR: 82 ML/MIN/1.73M2
EOSINOPHIL # BLD AUTO: 0.17 K/UL
EOSINOPHIL NFR BLD AUTO: 1.6 %
EPITHELIAL CELLS: 8 /HPF
ESTIMATED AVERAGE GLUCOSE: 160 MG/DL
FERRITIN SERPL-MCNC: 34 NG/ML
FOLATE SERPL-MCNC: 8.7 NG/ML
GLUCOSE QUALITATIVE U: NEGATIVE MG/DL
GLUCOSE SERPL-MCNC: 145 MG/DL
HBA1C MFR BLD HPLC: 7.2 %
HCT VFR BLD CALC: 42 %
HDLC SERPL-MCNC: 46 MG/DL
HGB BLD-MCNC: 12.6 G/DL
IMM GRANULOCYTES NFR BLD AUTO: 0.3 %
KETONES URINE: NEGATIVE MG/DL
LDLC SERPL CALC-MCNC: 65 MG/DL
LEUKOCYTE ESTERASE URINE: ABNORMAL
LYMPHOCYTES # BLD AUTO: 2.58 K/UL
LYMPHOCYTES NFR BLD AUTO: 23.6 %
MAN DIFF?: NORMAL
MCHC RBC-ENTMCNC: 27.5 PG
MCHC RBC-ENTMCNC: 30 GM/DL
MCV RBC AUTO: 91.5 FL
MICROSCOPIC-UA: NORMAL
MONOCYTES # BLD AUTO: 0.63 K/UL
MONOCYTES NFR BLD AUTO: 5.8 %
NEUTROPHILS # BLD AUTO: 7.45 K/UL
NEUTROPHILS NFR BLD AUTO: 68.2 %
NITRITE URINE: NEGATIVE
NONHDLC SERPL-MCNC: 84 MG/DL
PH URINE: 5.5
PLATELET # BLD AUTO: 341 K/UL
POTASSIUM SERPL-SCNC: 4.8 MMOL/L
PROT SERPL-MCNC: 7.5 G/DL
PROTEIN URINE: NEGATIVE MG/DL
RBC # BLD: 4.59 M/UL
RBC # FLD: 13.8 %
RED BLOOD CELLS URINE: 1 /HPF
SODIUM SERPL-SCNC: 138 MMOL/L
SPECIFIC GRAVITY URINE: 1.02
TRIGL SERPL-MCNC: 102 MG/DL
TSH SERPL-ACNC: 0.89 UIU/ML
UROBILINOGEN URINE: 0.2 MG/DL
VIT B12 SERPL-MCNC: 449 PG/ML
WBC # FLD AUTO: 10.91 K/UL
WHITE BLOOD CELLS URINE: 4 /HPF

## 2024-06-18 ENCOUNTER — APPOINTMENT (OUTPATIENT)
Dept: INTERNAL MEDICINE | Facility: CLINIC | Age: 54
End: 2024-06-18
Payer: COMMERCIAL

## 2024-06-18 VITALS
OXYGEN SATURATION: 99 % | DIASTOLIC BLOOD PRESSURE: 82 MMHG | HEART RATE: 99 BPM | BODY MASS INDEX: 36.37 KG/M2 | HEIGHT: 64 IN | SYSTOLIC BLOOD PRESSURE: 122 MMHG | WEIGHT: 213 LBS | TEMPERATURE: 98.2 F

## 2024-06-18 DIAGNOSIS — J06.9 ACUTE UPPER RESPIRATORY INFECTION, UNSPECIFIED: ICD-10-CM

## 2024-06-18 PROCEDURE — 99212 OFFICE O/P EST SF 10 MIN: CPT

## 2024-06-18 RX ORDER — FLUTICASONE PROPIONATE 50 UG/1
50 SPRAY, METERED NASAL TWICE DAILY
Qty: 1 | Refills: 0 | Status: ACTIVE | COMMUNITY
Start: 2024-06-18 | End: 1900-01-01

## 2024-06-18 NOTE — PLAN
[FreeTextEntry1] : URI: - Likely viral in nature. Patient overall well-appearing. Fevers have stopped. No shortness of breath or dyspnea on exertion. - POCT flu negative - RVP collected and sent today - Advised patient she can take Coricidin for symptom control. Advised patient not to take Tylenol with Corcidin, but can take Tylenol if she is not taking Coricidin. - Rx fluticasone proopionate 50 mcg nasal suspension 1 spray in each nostril BID sent to patient's pharamcy for PND - Recommend aggressive hydration - Advised patient to continue all diabetic medications. Recommend low-carb diet. Elevated blood glucoses likely 2/2 infection  f/u PRN

## 2024-06-18 NOTE — HISTORY OF PRESENT ILLNESS
[FreeTextEntry8] : Patient is a 54-year-old female presenting for upper respiratory infection symptoms. Patient reports on Saturday night, she developed a sore throat, nasal congestion, sneezing, non-productive cough, body aches, and fatigue. She had a fever on Sunday night of 101. She also endorses a post nasal drip, mild nausea with no vomiting. She denies shortness of breath, dyspnea on exertion, and diarrhea. She has not been taking any OTC medications for her symptoms. She reports she has not been sleeping well due to the nasal congestion. She endorses her blood sugars have been slightly elevated the last few days since she's been sick. She has not stopped her DM medications.

## 2024-06-18 NOTE — REVIEW OF SYSTEMS
[Fever] : fever [Chills] : chills [Fatigue] : fatigue [Nasal Discharge] : nasal discharge [Sore Throat] : sore throat [Postnasal Drip] : postnasal drip [Cough] : cough [Negative] : Heme/Lymph [Hot Flashes] : no hot flashes [Night Sweats] : no night sweats [Recent Change In Weight] : ~T no recent weight change [Earache] : no earache [Hearing Loss] : no hearing loss [Nosebleed] : no nosebleeds [Hoarseness] : no hoarseness [Shortness Of Breath] : no shortness of breath [Wheezing] : no wheezing [Dyspnea on Exertion] : no dyspnea on exertion

## 2024-06-18 NOTE — PHYSICAL EXAM
[No Acute Distress] : no acute distress [Well Nourished] : well nourished [Well Developed] : well developed [Well-Appearing] : well-appearing [Normal Sclera/Conjunctiva] : normal sclera/conjunctiva [PERRL] : pupils equal round and reactive to light [EOMI] : extraocular movements intact [Normal Outer Ear/Nose] : the outer ears and nose were normal in appearance [Normal Oropharynx] : the oropharynx was normal [No JVD] : no jugular venous distention [No Lymphadenopathy] : no lymphadenopathy [Supple] : supple [Thyroid Normal, No Nodules] : the thyroid was normal and there were no nodules present [No Respiratory Distress] : no respiratory distress  [No Accessory Muscle Use] : no accessory muscle use [Clear to Auscultation] : lungs were clear to auscultation bilaterally [Normal Rate] : normal rate  [Regular Rhythm] : with a regular rhythm [Normal S1, S2] : normal S1 and S2 [No Murmur] : no murmur heard [No Carotid Bruits] : no carotid bruits [No Abdominal Bruit] : a ~M bruit was not heard ~T in the abdomen [No Varicosities] : no varicosities [Pedal Pulses Present] : the pedal pulses are present [No Edema] : there was no peripheral edema [No Palpable Aorta] : no palpable aorta [No Extremity Clubbing/Cyanosis] : no extremity clubbing/cyanosis [Soft] : abdomen soft [Non Tender] : non-tender [Non-distended] : non-distended [No Masses] : no abdominal mass palpated [No HSM] : no HSM [Normal Bowel Sounds] : normal bowel sounds [Normal Posterior Cervical Nodes] : no posterior cervical lymphadenopathy [Normal Anterior Cervical Nodes] : no anterior cervical lymphadenopathy [No CVA Tenderness] : no CVA  tenderness [No Spinal Tenderness] : no spinal tenderness [No Joint Swelling] : no joint swelling [Grossly Normal Strength/Tone] : grossly normal strength/tone [No Rash] : no rash [Coordination Grossly Intact] : coordination grossly intact [No Focal Deficits] : no focal deficits [Normal Gait] : normal gait [Deep Tendon Reflexes (DTR)] : deep tendon reflexes were 2+ and symmetric [Normal Affect] : the affect was normal [Normal Insight/Judgement] : insight and judgment were intact [de-identified] : bilateral bulging of TMs. No pharyngeal erythema. + PND on exam

## 2024-06-19 LAB
RAPID RVP RESULT: DETECTED
RV+EV RNA SPEC QL NAA+PROBE: DETECTED
SARS-COV-2 RNA PNL RESP NAA+PROBE: NOT DETECTED

## 2024-06-22 ENCOUNTER — OFFICE (OUTPATIENT)
Dept: URBAN - METROPOLITAN AREA CLINIC 94 | Facility: CLINIC | Age: 54
Setting detail: OPHTHALMOLOGY
End: 2024-06-22
Payer: COMMERCIAL

## 2024-06-22 DIAGNOSIS — E11.3293: ICD-10-CM

## 2024-06-22 PROCEDURE — 92014 COMPRE OPH EXAM EST PT 1/>: CPT | Performed by: SPECIALIST

## 2024-06-22 PROCEDURE — 92134 CPTRZ OPH DX IMG PST SGM RTA: CPT | Performed by: SPECIALIST

## 2024-06-22 PROCEDURE — 92235 FLUORESCEIN ANGRPH MLTIFRAME: CPT | Performed by: SPECIALIST

## 2024-06-22 ASSESSMENT — CONFRONTATIONAL VISUAL FIELD TEST (CVF)
OS_FINDINGS: FULL
OD_FINDINGS: FULL

## 2024-06-27 ENCOUNTER — APPOINTMENT (OUTPATIENT)
Dept: NUTRITION | Facility: CLINIC | Age: 54
End: 2024-06-27

## 2024-07-10 ENCOUNTER — APPOINTMENT (OUTPATIENT)
Dept: INTERNAL MEDICINE | Facility: CLINIC | Age: 54
End: 2024-07-10
Payer: COMMERCIAL

## 2024-07-10 VITALS
HEART RATE: 80 BPM | OXYGEN SATURATION: 98 % | DIASTOLIC BLOOD PRESSURE: 76 MMHG | BODY MASS INDEX: 35.68 KG/M2 | TEMPERATURE: 98.3 F | SYSTOLIC BLOOD PRESSURE: 120 MMHG | WEIGHT: 209 LBS | HEIGHT: 64 IN

## 2024-07-10 PROCEDURE — 99213 OFFICE O/P EST LOW 20 MIN: CPT

## 2024-07-10 PROCEDURE — G2211 COMPLEX E/M VISIT ADD ON: CPT

## 2024-07-10 RX ORDER — CLOPIDOGREL BISULFATE 75 MG/1
75 TABLET, FILM COATED ORAL
Refills: 0 | Status: ACTIVE | COMMUNITY

## 2024-07-10 RX ORDER — LEVOFLOXACIN 500 MG/1
500 TABLET, FILM COATED ORAL DAILY
Qty: 7 | Refills: 0 | Status: ACTIVE | COMMUNITY
Start: 2024-07-10 | End: 1900-01-01

## 2024-07-15 ENCOUNTER — APPOINTMENT (OUTPATIENT)
Dept: INTERNAL MEDICINE | Facility: CLINIC | Age: 54
End: 2024-07-15
Payer: COMMERCIAL

## 2024-07-15 ENCOUNTER — NON-APPOINTMENT (OUTPATIENT)
Age: 54
End: 2024-07-15

## 2024-07-15 VITALS
HEIGHT: 64 IN | BODY MASS INDEX: 35.51 KG/M2 | OXYGEN SATURATION: 98 % | WEIGHT: 208 LBS | HEART RATE: 83 BPM | DIASTOLIC BLOOD PRESSURE: 82 MMHG | SYSTOLIC BLOOD PRESSURE: 120 MMHG

## 2024-07-15 DIAGNOSIS — E66.9 OBESITY, UNSPECIFIED: ICD-10-CM

## 2024-07-15 DIAGNOSIS — L03.213 PERIORBITAL CELLULITIS: ICD-10-CM

## 2024-07-15 DIAGNOSIS — I25.10 ATHEROSCLEROTIC HEART DISEASE OF NATIVE CORONARY ARTERY W/OUT ANGINA PECTORIS: ICD-10-CM

## 2024-07-15 DIAGNOSIS — E11.9 TYPE 2 DIABETES MELLITUS W/OUT COMPLICATIONS: ICD-10-CM

## 2024-07-15 PROCEDURE — 93000 ELECTROCARDIOGRAM COMPLETE: CPT

## 2024-07-15 PROCEDURE — G2211 COMPLEX E/M VISIT ADD ON: CPT

## 2024-07-15 PROCEDURE — 99215 OFFICE O/P EST HI 40 MIN: CPT

## 2024-07-30 ENCOUNTER — APPOINTMENT (OUTPATIENT)
Dept: NUTRITION | Facility: CLINIC | Age: 54
End: 2024-07-30
Payer: COMMERCIAL

## 2024-07-30 PROCEDURE — 97802 MEDICAL NUTRITION INDIV IN: CPT | Mod: 95

## 2024-07-31 ENCOUNTER — APPOINTMENT (OUTPATIENT)
Dept: INTERNAL MEDICINE | Facility: CLINIC | Age: 54
End: 2024-07-31
Payer: COMMERCIAL

## 2024-07-31 VITALS
WEIGHT: 207 LBS | BODY MASS INDEX: 35.34 KG/M2 | HEART RATE: 87 BPM | SYSTOLIC BLOOD PRESSURE: 124 MMHG | OXYGEN SATURATION: 99 % | DIASTOLIC BLOOD PRESSURE: 78 MMHG | HEIGHT: 64 IN

## 2024-07-31 DIAGNOSIS — M54.9 DORSALGIA, UNSPECIFIED: ICD-10-CM

## 2024-07-31 DIAGNOSIS — N28.9 DISORDER OF KIDNEY AND URETER, UNSPECIFIED: ICD-10-CM

## 2024-07-31 DIAGNOSIS — R10.9 UNSPECIFIED ABDOMINAL PAIN: ICD-10-CM

## 2024-07-31 PROCEDURE — 81003 URINALYSIS AUTO W/O SCOPE: CPT | Mod: QW

## 2024-07-31 PROCEDURE — 99213 OFFICE O/P EST LOW 20 MIN: CPT

## 2024-07-31 RX ORDER — METHOCARBAMOL 750 MG/1
750 TABLET, FILM COATED ORAL 3 TIMES DAILY
Qty: 18 | Refills: 0 | Status: ACTIVE | COMMUNITY
Start: 2024-07-31 | End: 1900-01-01

## 2024-07-31 NOTE — PLAN
[FreeTextEntry1] : Back pain: - Likely strain of latissimus dorsi given tenderness to palpation, pain worse when sitting/bending - Rx methocarbamol 750 mg x 2 tabs TID x 3 days. - Recommend Tylenol for pain with max daily dose 4000 mg. - No NSAIDs given plavix use - No spinal tenderness on exam. No falls/injury. No need for L-spine xray at this time, as pain is located on right side of midback - Will check UA for hematuria given pain radiating to flank - CTAP w/wo contrast as patient is due for follow-up of hypodensity of left kidney anyway - Recommend increasing fluid intake - Advised patient to avoid long periods of sitting/laying down. Recommend standing/walking as much as possible - Can use either heat/ice depending on which provides symptom relief - Confirmed with patient that bruises were not due to someone hitting/hurting patient. She reports it may be from massaging the area in the back. Patient brusies easily due to plavix use. - Advised patient to call office if pain not improving

## 2024-07-31 NOTE — PHYSICAL EXAM
[No Acute Distress] : no acute distress [Well Nourished] : well nourished [Well Developed] : well developed [Well-Appearing] : well-appearing [Normal Sclera/Conjunctiva] : normal sclera/conjunctiva [PERRL] : pupils equal round and reactive to light [EOMI] : extraocular movements intact [Normal Outer Ear/Nose] : the outer ears and nose were normal in appearance [Normal Oropharynx] : the oropharynx was normal [No JVD] : no jugular venous distention [No Lymphadenopathy] : no lymphadenopathy [Supple] : supple [Thyroid Normal, No Nodules] : the thyroid was normal and there were no nodules present [No Respiratory Distress] : no respiratory distress  [No Accessory Muscle Use] : no accessory muscle use [Clear to Auscultation] : lungs were clear to auscultation bilaterally [Normal Rate] : normal rate  [Regular Rhythm] : with a regular rhythm [Normal S1, S2] : normal S1 and S2 [No Murmur] : no murmur heard [No Carotid Bruits] : no carotid bruits [No Abdominal Bruit] : a ~M bruit was not heard ~T in the abdomen [No Varicosities] : no varicosities [Pedal Pulses Present] : the pedal pulses are present [No Edema] : there was no peripheral edema [No Palpable Aorta] : no palpable aorta [No Extremity Clubbing/Cyanosis] : no extremity clubbing/cyanosis [Soft] : abdomen soft [Non Tender] : non-tender [Non-distended] : non-distended [No Masses] : no abdominal mass palpated [No HSM] : no HSM [Normal Bowel Sounds] : normal bowel sounds [Normal Posterior Cervical Nodes] : no posterior cervical lymphadenopathy [Normal Anterior Cervical Nodes] : no anterior cervical lymphadenopathy [No Spinal Tenderness] : no spinal tenderness [No Joint Swelling] : no joint swelling [Grossly Normal Strength/Tone] : grossly normal strength/tone [No Rash] : no rash [Coordination Grossly Intact] : coordination grossly intact [No Focal Deficits] : no focal deficits [Normal Gait] : normal gait [Deep Tendon Reflexes (DTR)] : deep tendon reflexes were 2+ and symmetric [Normal Affect] : the affect was normal [Normal Insight/Judgement] : insight and judgment were intact [de-identified] : No CVA tenderness [de-identified] : Tenderness to palpation of the right latissimus dorsi of the back. Area feels tight/hard on palpation.  [de-identified] : area of back where pain is located with 4 distinct well-healing bruises the size of a thumbprint

## 2024-07-31 NOTE — HISTORY OF PRESENT ILLNESS
[FreeTextEntry8] : Patient is a 54-year-old female presenting for right midback pain. Patient reports she woke up 2 weeks ago with pain in her right midback. Patient reports the pain is constant. It is not sharp or burning pain. She said she's unable to describe pain. Today, pain started radiating laterally near the right flank. No hematuria, fevers/chills. Patient reports she threw up once last week. No bladder/bowel incontinence or saddle anesthesia. Patient reports she is urinating normally. Pain is worse when sitting and twisting. She feels the pain when standing, but it's not as bad. It is worse today than it's been. The area on her back is tender when she touches it. Patient denies falls/injury/trauma to the area. Patient reports she feels very well aside from back pain.

## 2024-08-01 LAB
APPEARANCE: CLEAR
BACTERIA: ABNORMAL /HPF
BILIRUBIN URINE: NEGATIVE
BLOOD URINE: NEGATIVE
CAST: 0 /LPF
COLOR: NORMAL
EPITHELIAL CELLS: 16 /HPF
GLUCOSE QUALITATIVE U: NEGATIVE MG/DL
KETONES URINE: ABNORMAL MG/DL
LEUKOCYTE ESTERASE URINE: ABNORMAL
MICROSCOPIC-UA: NORMAL
NITRITE URINE: NEGATIVE
PH URINE: 5.5
PROTEIN URINE: NORMAL MG/DL
RED BLOOD CELLS URINE: 18 /HPF
REVIEW: NORMAL
SPECIFIC GRAVITY URINE: >1.03
UROBILINOGEN URINE: 1 MG/DL
WHITE BLOOD CELLS URINE: 8 /HPF
YEAST-LIKE CELLS: PRESENT

## 2024-08-22 ENCOUNTER — APPOINTMENT (OUTPATIENT)
Dept: ENDOCRINOLOGY | Facility: CLINIC | Age: 54
End: 2024-08-22
Payer: COMMERCIAL

## 2024-08-22 VITALS
SYSTOLIC BLOOD PRESSURE: 110 MMHG | BODY MASS INDEX: 34.66 KG/M2 | OXYGEN SATURATION: 99 % | DIASTOLIC BLOOD PRESSURE: 64 MMHG | HEART RATE: 75 BPM | WEIGHT: 203 LBS | HEIGHT: 64 IN

## 2024-08-22 DIAGNOSIS — E78.5 HYPERLIPIDEMIA, UNSPECIFIED: ICD-10-CM

## 2024-08-22 DIAGNOSIS — E66.9 OBESITY, UNSPECIFIED: ICD-10-CM

## 2024-08-22 DIAGNOSIS — E11.9 TYPE 2 DIABETES MELLITUS W/OUT COMPLICATIONS: ICD-10-CM

## 2024-08-22 LAB — GLUCOSE BLDC GLUCOMTR-MCNC: 122

## 2024-08-22 PROCEDURE — 82962 GLUCOSE BLOOD TEST: CPT

## 2024-08-22 PROCEDURE — 99205 OFFICE O/P NEW HI 60 MIN: CPT

## 2024-08-22 RX ORDER — ASPIRIN 81 MG
81 TABLET, DELAYED RELEASE (ENTERIC COATED) ORAL
Refills: 0 | Status: ACTIVE | COMMUNITY

## 2024-08-22 RX ORDER — TIRZEPATIDE 5 MG/.5ML
5 INJECTION, SOLUTION SUBCUTANEOUS
Refills: 0 | Status: ACTIVE | COMMUNITY

## 2024-08-22 NOTE — ASSESSMENT
[Long Term Vascular Complications] : long term vascular complications of diabetes [Carbohydrate Consistent Diet] : carbohydrate consistent diet [Importance of Diet and Exercise] : importance of diet and exercise to improve glycemic control, achieve weight loss and improve cardiovascular health [FreeTextEntry1] :  Saumya is a 54 yr old female, who presents today for a consultation in regards type  2 diabetes.   Per patient , has been diabetic since 10 years. Family history includes father and mother with diabetes.  Has cardiac stent placed in 4/24 in [facility].  Currently taking metformin 500 mg , 2 pills in am and 1 pill in pm,  and mounjaro 5 mg weekly.( was on trulicity, was on it for 4 years,  then switched to mounjaro since 1 yr and since then her sugars better)  Lost 25 pounds.  FS in office 122 current severity: uncontrolled A1C  7.2% in 6/24  Medication changes: no changes today, continue same meds for now.  To check sugars  at different times. Diet and exercise reviewed. Hypoglycemia reviewed.  Eyes: no  active issues,  no known  retinopathy Feet: no active issues, no neuropathy.  Diabetic foot care reviewed.  Lipids: dyslipidemia:  on statin/ anti-lipid treatment. Last LDL:  65  Renal/ B/P : B/P wnl ,  not on ACE/ ARB. will check for proteinuria.   Weight:   obese:  Balanced diet and exercise reviewed. On mounjaro, loosing weight.   Diabetes counselling:  The patient was counseled on diabetes foot care, long term vascular complications of diabetes, carbohydrate consistent diet, importance of diet and exercise to improve glycemic control, achieve weight loss and improve cardiovascular health and action and use of short and long-acting insulin. Patient was referred to ophthalmology for retinopathy screening. ADA diet (30-50 gm carbohydrates with each meal, 15 grams with snacks. Balance with lean proteins and low fat diet.) Exercise daily per ability, work up to 30 minutes a day. Medication, risks and benefits reviewed. Glucose testing and insulin administration for glycemic management.   FOLLOWUP@ 3 months

## 2024-08-22 NOTE — REVIEW OF SYSTEMS
[Fatigue] : fatigue [Recent Weight Loss (___ Lbs)] : recent weight loss: [unfilled] lbs [Decreased Appetite] : appetite not decreased [Recent Weight Gain (___ Lbs)] : no recent weight gain [Visual Field Defect] : no visual field defect [Dysphagia] : no dysphagia [Chest Pain] : no chest pain [Palpitations] : no palpitations [Shortness Of Breath] : no shortness of breath [Nausea] : no nausea [Constipation] : no constipation [Abdominal Pain] : no abdominal pain [Vomiting] : no vomiting [Diarrhea] : no diarrhea [Polyuria] : no polyuria [Joint Pain] : no joint pain [Myalgia] : no myalgia  [Acanthosis] : no acanthosis  [Acne] : no acne [Headaches] : no headaches [Dizziness] : no dizziness [Tremors] : no tremors [Depression] : no depression [Insomnia] : no insomnia [Cold Intolerance] : no cold intolerance [Heat Intolerance] : no heat intolerance

## 2024-08-22 NOTE — HISTORY OF PRESENT ILLNESS
[FreeTextEntry1] : Saumya is a 54 yr old female, who presents today for a consultation in regards type  2 diabetes.   Per patient , has been diabetic since 10 years. Family history includes father and mother with diabetes.  Has cardiac stent placed in  in [facility].  Currently taking metformin 500 mg , 2 pills in am and 1 pill in pm,  and mounjaro 5 mg weekly.( was on trulicity, was on it for 4 years,  then switched to mounjaro since 1 yr and since then her sugars better)  Lost 25 pounds.  FS in office 122 current severity: uncontrolled A1C  7.2% in    Home Glucose Monitoring Frequency: has dexcom, but we could not download today. Reviewed her sugars on phone: 14 day av In range: 97% high :3% below range:0%  Has changed her diet a lot, cut back on carbs.   Diabetic History ER Visits:  none Hospitalizations:   none Diet Therapy: now watching his diet Diabetic Education:   no Exercise:  walks 30 min few days a week.  Last eye exam: gets twice a year. No changes per patient

## 2024-08-22 NOTE — PHYSICAL EXAM
Diflucan sent to pharmacy   [Alert] : alert [Well Nourished] : well nourished [No Acute Distress] : no acute distress [Normal Sclera/Conjunctiva] : normal sclera/conjunctiva [No Neck Mass] : no neck mass was observed [No LAD] : no lymphadenopathy [Thyroid Not Enlarged] : the thyroid was not enlarged [No Respiratory Distress] : no respiratory distress [No Accessory Muscle Use] : no accessory muscle use [Clear to Auscultation] : lungs were clear to auscultation bilaterally [Normal S1, S2] : normal S1 and S2 [Normal Rate] : heart rate was normal [Regular Rhythm] : with a regular rhythm [Normal Bowel Sounds] : normal bowel sounds [Not Tender] : non-tender [Soft] : abdomen soft [No Stigmata of Cushings Syndrome] : no stigmata of Cushings Syndrome [Normal Gait] : normal gait [No Rash] : no rash [Normal] : normal [Oriented x3] : oriented to person, place, and time [Normal Affect] : the affect was normal [Normal Insight/Judgement] : insight and judgment were intact [Normal Mood] : the mood was normal [Acanthosis Nigricans] : no acanthosis nigricans [Diminished Throughout Both Feet] : normal tactile sensation with monofilament testing throughout both feet

## 2024-08-27 ENCOUNTER — APPOINTMENT (OUTPATIENT)
Dept: NUTRITION | Facility: CLINIC | Age: 54
End: 2024-08-27
Payer: COMMERCIAL

## 2024-08-27 PROCEDURE — 97803 MED NUTRITION INDIV SUBSEQ: CPT | Mod: 95

## 2024-09-06 ENCOUNTER — NON-APPOINTMENT (OUTPATIENT)
Age: 54
End: 2024-09-06

## 2024-09-06 ENCOUNTER — APPOINTMENT (OUTPATIENT)
Dept: DERMATOLOGY | Facility: CLINIC | Age: 54
End: 2024-09-06
Payer: COMMERCIAL

## 2024-09-06 PROCEDURE — 99213 OFFICE O/P EST LOW 20 MIN: CPT | Mod: 25

## 2024-09-06 PROCEDURE — 11900 INJECT SKIN LESIONS </W 7: CPT

## 2024-09-26 ENCOUNTER — APPOINTMENT (OUTPATIENT)
Dept: INTERNAL MEDICINE | Facility: CLINIC | Age: 54
End: 2024-09-26
Payer: COMMERCIAL

## 2024-09-26 VITALS
DIASTOLIC BLOOD PRESSURE: 84 MMHG | HEART RATE: 90 BPM | WEIGHT: 205 LBS | HEIGHT: 64 IN | SYSTOLIC BLOOD PRESSURE: 128 MMHG | OXYGEN SATURATION: 98 % | BODY MASS INDEX: 35 KG/M2

## 2024-09-26 DIAGNOSIS — L23.7 ALLERGIC CONTACT DERMATITIS DUE TO PLANTS, EXCEPT FOOD: ICD-10-CM

## 2024-09-26 PROCEDURE — 99212 OFFICE O/P EST SF 10 MIN: CPT

## 2024-09-26 RX ORDER — BETAMETHASONE DIPROPIONATE 0.5 MG/G
0.05 CREAM TOPICAL TWICE DAILY
Qty: 1 | Refills: 0 | Status: ACTIVE | COMMUNITY
Start: 2024-09-26 | End: 1900-01-01

## 2024-09-26 NOTE — PHYSICAL EXAM
[No Acute Distress] : no acute distress [Well Nourished] : well nourished [Well Developed] : well developed [Well-Appearing] : well-appearing [Normal Sclera/Conjunctiva] : normal sclera/conjunctiva [PERRL] : pupils equal round and reactive to light [EOMI] : extraocular movements intact [Normal Outer Ear/Nose] : the outer ears and nose were normal in appearance [Normal Oropharynx] : the oropharynx was normal [No JVD] : no jugular venous distention [No Lymphadenopathy] : no lymphadenopathy [Supple] : supple [Thyroid Normal, No Nodules] : the thyroid was normal and there were no nodules present [No Respiratory Distress] : no respiratory distress  [No Accessory Muscle Use] : no accessory muscle use [Clear to Auscultation] : lungs were clear to auscultation bilaterally [Normal Rate] : normal rate  [Regular Rhythm] : with a regular rhythm [Normal S1, S2] : normal S1 and S2 [No Murmur] : no murmur heard [No Carotid Bruits] : no carotid bruits [No Abdominal Bruit] : a ~M bruit was not heard ~T in the abdomen [No Varicosities] : no varicosities [Pedal Pulses Present] : the pedal pulses are present [No Edema] : there was no peripheral edema [No Palpable Aorta] : no palpable aorta [No Extremity Clubbing/Cyanosis] : no extremity clubbing/cyanosis [Soft] : abdomen soft [Non Tender] : non-tender [Non-distended] : non-distended [No Masses] : no abdominal mass palpated [No HSM] : no HSM [Normal Bowel Sounds] : normal bowel sounds [Normal Posterior Cervical Nodes] : no posterior cervical lymphadenopathy [Normal Anterior Cervical Nodes] : no anterior cervical lymphadenopathy [No CVA Tenderness] : no CVA  tenderness [No Spinal Tenderness] : no spinal tenderness [No Joint Swelling] : no joint swelling [Grossly Normal Strength/Tone] : grossly normal strength/tone [Coordination Grossly Intact] : coordination grossly intact [No Focal Deficits] : no focal deficits [Normal Gait] : normal gait [Deep Tendon Reflexes (DTR)] : deep tendon reflexes were 2+ and symmetric [Normal Affect] : the affect was normal [Normal Insight/Judgement] : insight and judgment were intact [de-identified] : papular rash with erythematous base of bilateral upper extremities and left upper thigh

## 2024-09-26 NOTE — HISTORY OF PRESENT ILLNESS
[FreeTextEntry8] : Patient is a 54-year-old female presenting for a rash. Patient reports a rash that started on her right arm and then spread to her groin area. She described it as 'bumps and hives', with some areas appearing blister-like. She stated that the rash was painful to the touch and itchy. She had been applying previously prescribed triamcinolone cream from a few years ago which had seemingly improved the condition, but as the rash spread, newer areas became painful. She mentioned she was gardening and had contact with some rosa a few days prior to the appearance of the rash and suspected it to be either poison ivy or sumac, as the symptoms are similar to her poison sumac dermatitis she had a few years ago.

## 2024-09-26 NOTE — PHYSICAL EXAM
[No Acute Distress] : no acute distress [Well Nourished] : well nourished [Well Developed] : well developed [Well-Appearing] : well-appearing [Normal Sclera/Conjunctiva] : normal sclera/conjunctiva [PERRL] : pupils equal round and reactive to light [EOMI] : extraocular movements intact [Normal Outer Ear/Nose] : the outer ears and nose were normal in appearance [Normal Oropharynx] : the oropharynx was normal [No JVD] : no jugular venous distention [No Lymphadenopathy] : no lymphadenopathy [Supple] : supple [Thyroid Normal, No Nodules] : the thyroid was normal and there were no nodules present [No Respiratory Distress] : no respiratory distress  [No Accessory Muscle Use] : no accessory muscle use [Clear to Auscultation] : lungs were clear to auscultation bilaterally [Normal Rate] : normal rate  [Regular Rhythm] : with a regular rhythm [Normal S1, S2] : normal S1 and S2 [No Murmur] : no murmur heard [No Carotid Bruits] : no carotid bruits [No Abdominal Bruit] : a ~M bruit was not heard ~T in the abdomen [No Varicosities] : no varicosities [Pedal Pulses Present] : the pedal pulses are present [No Edema] : there was no peripheral edema [No Palpable Aorta] : no palpable aorta [No Extremity Clubbing/Cyanosis] : no extremity clubbing/cyanosis [Soft] : abdomen soft [Non Tender] : non-tender [Non-distended] : non-distended [No Masses] : no abdominal mass palpated [No HSM] : no HSM [Normal Bowel Sounds] : normal bowel sounds [Normal Posterior Cervical Nodes] : no posterior cervical lymphadenopathy [Normal Anterior Cervical Nodes] : no anterior cervical lymphadenopathy [No CVA Tenderness] : no CVA  tenderness [No Spinal Tenderness] : no spinal tenderness [No Joint Swelling] : no joint swelling [Grossly Normal Strength/Tone] : grossly normal strength/tone [Coordination Grossly Intact] : coordination grossly intact [No Focal Deficits] : no focal deficits [Normal Gait] : normal gait [Deep Tendon Reflexes (DTR)] : deep tendon reflexes were 2+ and symmetric [Normal Affect] : the affect was normal [Normal Insight/Judgement] : insight and judgment were intact [de-identified] : papular rash with erythematous base of bilateral upper extremities and left upper thigh

## 2024-09-26 NOTE — PLAN
[FreeTextEntry1] : Poison sumac/ivy: - Patient with rash c/w poison sumac or poison ivy. Rash appears mild without bullae or widespread rash - Patient's last A1C 7.2% and patient reports she has been eating a lot of carbs lately. Would prefer to avoid systemic corticosteroids - No signs/symptoms of secondary infection - Rx betamethasone cream sent to patient's pharmacy. Advised patient to avoid use on face and genitals. Only use for max 2 weeks to avoid hyperpigmentation/atrophy - Recommend use of calamine lotion, Burow's solution, and cool compresses for symptomatic relief - Wash all sheets/clothing to avoid spread - Avoid scratching to reduce risk of secondary infection  f/u 1 week

## 2024-10-02 ENCOUNTER — APPOINTMENT (OUTPATIENT)
Dept: DERMATOLOGY | Facility: CLINIC | Age: 54
End: 2024-10-02

## 2024-10-15 ENCOUNTER — APPOINTMENT (OUTPATIENT)
Dept: INTERNAL MEDICINE | Facility: CLINIC | Age: 54
End: 2024-10-15
Payer: COMMERCIAL

## 2024-10-15 VITALS
OXYGEN SATURATION: 98 % | HEART RATE: 84 BPM | DIASTOLIC BLOOD PRESSURE: 78 MMHG | BODY MASS INDEX: 34.49 KG/M2 | SYSTOLIC BLOOD PRESSURE: 122 MMHG | HEIGHT: 64 IN | WEIGHT: 202 LBS

## 2024-10-15 DIAGNOSIS — F32.A DEPRESSION, UNSPECIFIED: ICD-10-CM

## 2024-10-15 DIAGNOSIS — E78.5 HYPERLIPIDEMIA, UNSPECIFIED: ICD-10-CM

## 2024-10-15 DIAGNOSIS — E66.9 OBESITY, UNSPECIFIED: ICD-10-CM

## 2024-10-15 DIAGNOSIS — E11.9 TYPE 2 DIABETES MELLITUS W/OUT COMPLICATIONS: ICD-10-CM

## 2024-10-15 DIAGNOSIS — I25.10 ATHEROSCLEROTIC HEART DISEASE OF NATIVE CORONARY ARTERY W/OUT ANGINA PECTORIS: ICD-10-CM

## 2024-10-15 PROCEDURE — 90673 RIV3 VACCINE NO PRESERV IM: CPT

## 2024-10-15 PROCEDURE — G0008: CPT

## 2024-10-15 PROCEDURE — 99214 OFFICE O/P EST MOD 30 MIN: CPT | Mod: 25

## 2024-10-15 PROCEDURE — 36415 COLL VENOUS BLD VENIPUNCTURE: CPT

## 2024-10-16 LAB
25(OH)D3 SERPL-MCNC: 75.2 NG/ML
ALBUMIN SERPL ELPH-MCNC: 4.5 G/DL
ALP BLD-CCNC: 114 U/L
ALT SERPL-CCNC: 23 U/L
ANION GAP SERPL CALC-SCNC: 14 MMOL/L
APPEARANCE: CLEAR
AST SERPL-CCNC: 15 U/L
BACTERIA: NEGATIVE /HPF
BASOPHILS # BLD AUTO: 0.06 K/UL
BASOPHILS NFR BLD AUTO: 0.6 %
BILIRUB SERPL-MCNC: 0.3 MG/DL
BILIRUBIN URINE: NEGATIVE
BLOOD URINE: NEGATIVE
BUN SERPL-MCNC: 11 MG/DL
CALCIUM SERPL-MCNC: 9.5 MG/DL
CAST: 1 /LPF
CHLORIDE SERPL-SCNC: 101 MMOL/L
CHOLEST SERPL-MCNC: 123 MG/DL
CO2 SERPL-SCNC: 25 MMOL/L
COLOR: YELLOW
CREAT SERPL-MCNC: 0.85 MG/DL
EGFR: 80 ML/MIN/1.73M2
EOSINOPHIL # BLD AUTO: 0.2 K/UL
EOSINOPHIL NFR BLD AUTO: 2 %
EPITHELIAL CELLS: 6 /HPF
ESTIMATED AVERAGE GLUCOSE: 134 MG/DL
FOLATE SERPL-MCNC: 8.7 NG/ML
GLUCOSE QUALITATIVE U: NEGATIVE MG/DL
GLUCOSE SERPL-MCNC: 113 MG/DL
HBA1C MFR BLD HPLC: 6.3 %
HCT VFR BLD CALC: 42 %
HDLC SERPL-MCNC: 42 MG/DL
HGB BLD-MCNC: 13.4 G/DL
IMM GRANULOCYTES NFR BLD AUTO: 0.3 %
KETONES URINE: NEGATIVE MG/DL
LDLC SERPL CALC-MCNC: 66 MG/DL
LEUKOCYTE ESTERASE URINE: ABNORMAL
LYMPHOCYTES # BLD AUTO: 2.33 K/UL
LYMPHOCYTES NFR BLD AUTO: 22.7 %
MAN DIFF?: NORMAL
MCHC RBC-ENTMCNC: 28.5 PG
MCHC RBC-ENTMCNC: 31.9 GM/DL
MCV RBC AUTO: 89.2 FL
MICROSCOPIC-UA: NORMAL
MONOCYTES # BLD AUTO: 0.54 K/UL
MONOCYTES NFR BLD AUTO: 5.3 %
NEUTROPHILS # BLD AUTO: 7.09 K/UL
NEUTROPHILS NFR BLD AUTO: 69.1 %
NITRITE URINE: NEGATIVE
NONHDLC SERPL-MCNC: 81 MG/DL
PH URINE: 6.5
PLATELET # BLD AUTO: 302 K/UL
POTASSIUM SERPL-SCNC: 5.1 MMOL/L
PROT SERPL-MCNC: 6.8 G/DL
PROTEIN URINE: NEGATIVE MG/DL
RBC # BLD: 4.71 M/UL
RBC # FLD: 14.6 %
RED BLOOD CELLS URINE: 1 /HPF
SODIUM SERPL-SCNC: 140 MMOL/L
SPECIFIC GRAVITY URINE: 1.02
TRIGL SERPL-MCNC: 71 MG/DL
TSH SERPL-ACNC: 0.66 UIU/ML
UROBILINOGEN URINE: 0.2 MG/DL
VIT B12 SERPL-MCNC: 532 PG/ML
WBC # FLD AUTO: 10.25 K/UL
WHITE BLOOD CELLS URINE: 2 /HPF

## 2024-10-16 RX ORDER — TIRZEPATIDE 7.5 MG/.5ML
7.5 INJECTION, SOLUTION SUBCUTANEOUS WEEKLY
Qty: 1 | Refills: 0 | Status: ACTIVE | COMMUNITY
Start: 2024-10-16 | End: 1900-01-01

## 2024-10-25 ENCOUNTER — APPOINTMENT (OUTPATIENT)
Dept: NUTRITION | Facility: CLINIC | Age: 54
End: 2024-10-25
Payer: COMMERCIAL

## 2024-10-25 PROCEDURE — 97803 MED NUTRITION INDIV SUBSEQ: CPT | Mod: 95

## 2024-11-14 ENCOUNTER — RX RENEWAL (OUTPATIENT)
Age: 54
End: 2024-11-14

## 2024-12-12 ENCOUNTER — TRANSCRIPTION ENCOUNTER (OUTPATIENT)
Age: 54
End: 2024-12-12

## 2024-12-16 ENCOUNTER — APPOINTMENT (OUTPATIENT)
Dept: ENDOCRINOLOGY | Facility: CLINIC | Age: 54
End: 2024-12-16
Payer: COMMERCIAL

## 2024-12-16 DIAGNOSIS — E11.9 TYPE 2 DIABETES MELLITUS W/OUT COMPLICATIONS: ICD-10-CM

## 2024-12-16 PROCEDURE — 98960 EDU&TRN PT SELF-MGMT NQHP 1: CPT

## 2025-01-04 ENCOUNTER — OFFICE (OUTPATIENT)
Dept: URBAN - METROPOLITAN AREA CLINIC 94 | Facility: CLINIC | Age: 55
Setting detail: OPHTHALMOLOGY
End: 2025-01-04
Payer: COMMERCIAL

## 2025-01-04 DIAGNOSIS — E11.3293: ICD-10-CM

## 2025-01-04 PROCEDURE — 92235 FLUORESCEIN ANGRPH MLTIFRAME: CPT | Performed by: SPECIALIST

## 2025-01-04 PROCEDURE — 92014 COMPRE OPH EXAM EST PT 1/>: CPT | Performed by: SPECIALIST

## 2025-01-04 PROCEDURE — 92134 CPTRZ OPH DX IMG PST SGM RTA: CPT | Performed by: SPECIALIST

## 2025-01-04 ASSESSMENT — CONFRONTATIONAL VISUAL FIELD TEST (CVF)
OS_FINDINGS: FULL
OD_FINDINGS: FULL

## 2025-01-04 ASSESSMENT — TONOMETRY
OD_IOP_MMHG: 18
OS_IOP_MMHG: 17

## 2025-01-04 ASSESSMENT — KERATOMETRY
OD_K2POWER_DIOPTERS: 45.50
OS_K2POWER_DIOPTERS: 44.75
METHOD_AUTO_MANUAL: AUTO
OD_AXISANGLE_DEGREES: 095
OS_AXISANGLE_DEGREES: 059
OD_K1POWER_DIOPTERS: 43.75
OS_K1POWER_DIOPTERS: 43.75

## 2025-01-04 ASSESSMENT — REFRACTION_AUTOREFRACTION
OD_SPHERE: -1.00
OD_AXIS: 011
OS_CYLINDER: -0.75
OD_CYLINDER: -1.25
OS_SPHERE: -1.75
OS_AXIS: 126

## 2025-01-04 ASSESSMENT — VISUAL ACUITY
OS_BCVA: 20/20
OD_BCVA: 20/20

## 2025-01-14 LAB
HBA1C MFR BLD HPLC: 6.4
LDLC SERPL DIRECT ASSAY-MCNC: 59
MICROALBUMIN/CREAT 24H UR-RTO: <3

## 2025-01-15 ENCOUNTER — APPOINTMENT (OUTPATIENT)
Dept: ENDOCRINOLOGY | Facility: CLINIC | Age: 55
End: 2025-01-15
Payer: COMMERCIAL

## 2025-01-15 VITALS
WEIGHT: 199 LBS | BODY MASS INDEX: 33.97 KG/M2 | HEIGHT: 64 IN | DIASTOLIC BLOOD PRESSURE: 76 MMHG | SYSTOLIC BLOOD PRESSURE: 124 MMHG | OXYGEN SATURATION: 98 % | HEART RATE: 88 BPM

## 2025-01-15 DIAGNOSIS — E66.9 OBESITY, UNSPECIFIED: ICD-10-CM

## 2025-01-15 DIAGNOSIS — E55.9 VITAMIN D DEFICIENCY, UNSPECIFIED: ICD-10-CM

## 2025-01-15 DIAGNOSIS — E78.5 HYPERLIPIDEMIA, UNSPECIFIED: ICD-10-CM

## 2025-01-15 LAB — GLUCOSE BLDC GLUCOMTR-MCNC: 130

## 2025-01-15 PROCEDURE — 95251 CONT GLUC MNTR ANALYSIS I&R: CPT

## 2025-01-15 PROCEDURE — 82962 GLUCOSE BLOOD TEST: CPT

## 2025-01-15 PROCEDURE — 99214 OFFICE O/P EST MOD 30 MIN: CPT

## 2025-01-22 ENCOUNTER — APPOINTMENT (OUTPATIENT)
Dept: INTERNAL MEDICINE | Facility: CLINIC | Age: 55
End: 2025-01-22
Payer: COMMERCIAL

## 2025-01-22 VITALS
SYSTOLIC BLOOD PRESSURE: 118 MMHG | BODY MASS INDEX: 33.46 KG/M2 | DIASTOLIC BLOOD PRESSURE: 70 MMHG | WEIGHT: 196 LBS | HEART RATE: 80 BPM | HEIGHT: 64 IN | OXYGEN SATURATION: 98 %

## 2025-01-22 DIAGNOSIS — G25.81 RESTLESS LEGS SYNDROME: ICD-10-CM

## 2025-01-22 DIAGNOSIS — E11.9 TYPE 2 DIABETES MELLITUS W/OUT COMPLICATIONS: ICD-10-CM

## 2025-01-22 DIAGNOSIS — I25.10 ATHEROSCLEROTIC HEART DISEASE OF NATIVE CORONARY ARTERY W/OUT ANGINA PECTORIS: ICD-10-CM

## 2025-01-22 PROCEDURE — 99214 OFFICE O/P EST MOD 30 MIN: CPT

## 2025-01-22 PROCEDURE — G2211 COMPLEX E/M VISIT ADD ON: CPT | Mod: NC

## 2025-01-29 RX ORDER — BLOOD-GLUCOSE SENSOR
EACH MISCELLANEOUS
Qty: 2 | Refills: 5 | Status: ACTIVE | COMMUNITY
Start: 2025-01-29 | End: 1900-01-01

## 2025-02-11 ENCOUNTER — APPOINTMENT (OUTPATIENT)
Dept: ENDOCRINOLOGY | Facility: CLINIC | Age: 55
End: 2025-02-11
Payer: COMMERCIAL

## 2025-02-11 DIAGNOSIS — E11.9 TYPE 2 DIABETES MELLITUS W/OUT COMPLICATIONS: ICD-10-CM

## 2025-02-11 PROCEDURE — G0108 DIAB MANAGE TRN  PER INDIV: CPT

## 2025-03-25 ENCOUNTER — APPOINTMENT (OUTPATIENT)
Dept: ENDOCRINOLOGY | Facility: CLINIC | Age: 55
End: 2025-03-25
Payer: COMMERCIAL

## 2025-03-25 DIAGNOSIS — E11.9 TYPE 2 DIABETES MELLITUS W/OUT COMPLICATIONS: ICD-10-CM

## 2025-03-25 PROCEDURE — G0108 DIAB MANAGE TRN  PER INDIV: CPT

## 2025-04-28 ENCOUNTER — APPOINTMENT (OUTPATIENT)
Dept: OBGYN | Facility: CLINIC | Age: 55
End: 2025-04-28
Payer: COMMERCIAL

## 2025-04-28 VITALS
SYSTOLIC BLOOD PRESSURE: 125 MMHG | DIASTOLIC BLOOD PRESSURE: 82 MMHG | BODY MASS INDEX: 32.61 KG/M2 | WEIGHT: 191 LBS | HEIGHT: 64 IN

## 2025-04-28 DIAGNOSIS — Z01.419 ENCOUNTER FOR GYNECOLOGICAL EXAMINATION (GENERAL) (ROUTINE) W/OUT ABNORMAL FINDINGS: ICD-10-CM

## 2025-04-28 DIAGNOSIS — E11.9 TYPE 2 DIABETES MELLITUS W/OUT COMPLICATIONS: ICD-10-CM

## 2025-04-28 DIAGNOSIS — Z00.00 ENCOUNTER FOR GENERAL ADULT MEDICAL EXAMINATION W/OUT ABNORMAL FINDINGS: ICD-10-CM

## 2025-04-28 PROCEDURE — 99386 PREV VISIT NEW AGE 40-64: CPT

## 2025-04-28 RX ORDER — ASPIRIN 81 MG/1
81 TABLET, COATED ORAL
Qty: 90 | Refills: 0 | Status: ACTIVE | COMMUNITY
Start: 2025-03-28

## 2025-04-29 ENCOUNTER — APPOINTMENT (OUTPATIENT)
Dept: ANTEPARTUM | Facility: CLINIC | Age: 55
End: 2025-04-29
Payer: COMMERCIAL

## 2025-04-29 ENCOUNTER — ASOB RESULT (OUTPATIENT)
Age: 55
End: 2025-04-29

## 2025-04-29 LAB — HPV HIGH+LOW RISK DNA PNL CVX: NOT DETECTED

## 2025-04-29 PROCEDURE — 76856 US EXAM PELVIC COMPLETE: CPT

## 2025-05-02 LAB — CYTOLOGY CVX/VAG DOC THIN PREP: NORMAL

## 2025-05-07 ENCOUNTER — APPOINTMENT (OUTPATIENT)
Dept: FAMILY MEDICINE | Facility: CLINIC | Age: 55
End: 2025-05-07

## 2025-05-13 ENCOUNTER — RX RENEWAL (OUTPATIENT)
Age: 55
End: 2025-05-13

## 2025-05-21 ENCOUNTER — APPOINTMENT (OUTPATIENT)
Dept: ENDOCRINOLOGY | Facility: CLINIC | Age: 55
End: 2025-05-21

## 2025-05-28 ENCOUNTER — APPOINTMENT (OUTPATIENT)
Dept: INTERNAL MEDICINE | Facility: CLINIC | Age: 55
End: 2025-05-28
Payer: COMMERCIAL

## 2025-05-28 VITALS
HEIGHT: 64 IN | SYSTOLIC BLOOD PRESSURE: 124 MMHG | OXYGEN SATURATION: 98 % | DIASTOLIC BLOOD PRESSURE: 82 MMHG | WEIGHT: 184 LBS | HEART RATE: 81 BPM | BODY MASS INDEX: 31.41 KG/M2

## 2025-05-28 DIAGNOSIS — E11.9 TYPE 2 DIABETES MELLITUS W/OUT COMPLICATIONS: ICD-10-CM

## 2025-05-28 DIAGNOSIS — F41.9 ANXIETY DISORDER, UNSPECIFIED: ICD-10-CM

## 2025-05-28 DIAGNOSIS — M54.9 DORSALGIA, UNSPECIFIED: ICD-10-CM

## 2025-05-28 PROCEDURE — G2211 COMPLEX E/M VISIT ADD ON: CPT | Mod: NC

## 2025-05-28 PROCEDURE — 99214 OFFICE O/P EST MOD 30 MIN: CPT

## 2025-06-10 ENCOUNTER — RX RENEWAL (OUTPATIENT)
Age: 55
End: 2025-06-10

## 2025-07-01 ENCOUNTER — APPOINTMENT (OUTPATIENT)
Dept: OBGYN | Facility: CLINIC | Age: 55
End: 2025-07-01
Payer: COMMERCIAL

## 2025-07-01 VITALS
HEIGHT: 64 IN | BODY MASS INDEX: 31.41 KG/M2 | DIASTOLIC BLOOD PRESSURE: 81 MMHG | HEART RATE: 77 BPM | SYSTOLIC BLOOD PRESSURE: 123 MMHG | WEIGHT: 184 LBS

## 2025-07-01 PROBLEM — D21.9 FIBROIDS: Status: ACTIVE | Noted: 2025-07-01

## 2025-07-01 PROCEDURE — 99213 OFFICE O/P EST LOW 20 MIN: CPT

## 2025-07-08 LAB
HBA1C MFR BLD HPLC: 5.9
LDLC SERPL DIRECT ASSAY-MCNC: 63
MICROALBUMIN/CREAT 24H UR-RTO: 5

## 2025-07-09 ENCOUNTER — APPOINTMENT (OUTPATIENT)
Dept: ENDOCRINOLOGY | Facility: CLINIC | Age: 55
End: 2025-07-09
Payer: COMMERCIAL

## 2025-07-09 VITALS
WEIGHT: 188 LBS | HEART RATE: 84 BPM | BODY MASS INDEX: 32.1 KG/M2 | SYSTOLIC BLOOD PRESSURE: 112 MMHG | OXYGEN SATURATION: 99 % | HEIGHT: 64 IN | DIASTOLIC BLOOD PRESSURE: 60 MMHG

## 2025-07-09 PROCEDURE — 99214 OFFICE O/P EST MOD 30 MIN: CPT

## 2025-07-09 RX ORDER — DESVENLAFAXINE 50 MG/1
50 TABLET, EXTENDED RELEASE ORAL
Refills: 0 | Status: ACTIVE | COMMUNITY

## 2025-07-14 ENCOUNTER — OFFICE (OUTPATIENT)
Dept: URBAN - METROPOLITAN AREA CLINIC 94 | Facility: CLINIC | Age: 55
Setting detail: OPHTHALMOLOGY
End: 2025-07-14
Payer: COMMERCIAL

## 2025-07-14 DIAGNOSIS — E11.3293: ICD-10-CM

## 2025-07-14 PROCEDURE — 92134 CPTRZ OPH DX IMG PST SGM RTA: CPT | Performed by: SPECIALIST

## 2025-07-14 PROCEDURE — 92014 COMPRE OPH EXAM EST PT 1/>: CPT | Performed by: SPECIALIST

## 2025-07-14 PROCEDURE — 92235 FLUORESCEIN ANGRPH MLTIFRAME: CPT | Performed by: SPECIALIST

## 2025-07-14 ASSESSMENT — VISUAL ACUITY
OD_BCVA: 20/20
OS_BCVA: 20/20

## 2025-07-14 ASSESSMENT — KERATOMETRY
METHOD_AUTO_MANUAL: AUTO
OD_K1POWER_DIOPTERS: 43.75
OS_K2POWER_DIOPTERS: 44.75
OD_AXISANGLE_DEGREES: 095
OS_AXISANGLE_DEGREES: 059
OS_K1POWER_DIOPTERS: 43.75
OD_K2POWER_DIOPTERS: 45.50

## 2025-07-14 ASSESSMENT — REFRACTION_AUTOREFRACTION
OD_AXIS: 011
OD_CYLINDER: -1.25
OS_AXIS: 126
OS_CYLINDER: -0.75
OD_SPHERE: -1.00
OS_SPHERE: -1.75

## 2025-07-14 ASSESSMENT — TONOMETRY
OS_IOP_MMHG: 16
OD_IOP_MMHG: 18

## 2025-07-14 ASSESSMENT — CONFRONTATIONAL VISUAL FIELD TEST (CVF)
OD_FINDINGS: FULL
OS_FINDINGS: FULL

## 2025-07-21 ENCOUNTER — APPOINTMENT (OUTPATIENT)
Dept: INTERNAL MEDICINE | Facility: CLINIC | Age: 55
End: 2025-07-21
Payer: COMMERCIAL

## 2025-07-21 VITALS
OXYGEN SATURATION: 98 % | DIASTOLIC BLOOD PRESSURE: 80 MMHG | HEART RATE: 87 BPM | SYSTOLIC BLOOD PRESSURE: 112 MMHG | HEIGHT: 64 IN | WEIGHT: 190 LBS | BODY MASS INDEX: 32.44 KG/M2

## 2025-07-21 DIAGNOSIS — F32.A DEPRESSION, UNSPECIFIED: ICD-10-CM

## 2025-07-21 DIAGNOSIS — E66.9 OBESITY, UNSPECIFIED: ICD-10-CM

## 2025-07-21 DIAGNOSIS — E11.9 TYPE 2 DIABETES MELLITUS W/OUT COMPLICATIONS: ICD-10-CM

## 2025-07-21 DIAGNOSIS — F41.9 ANXIETY DISORDER, UNSPECIFIED: ICD-10-CM

## 2025-07-21 DIAGNOSIS — M10.9 GOUT, UNSPECIFIED: ICD-10-CM

## 2025-07-21 DIAGNOSIS — I25.10 ATHEROSCLEROTIC HEART DISEASE OF NATIVE CORONARY ARTERY W/OUT ANGINA PECTORIS: ICD-10-CM

## 2025-07-21 DIAGNOSIS — D50.9 IRON DEFICIENCY ANEMIA, UNSPECIFIED: ICD-10-CM

## 2025-07-21 PROCEDURE — G2211 COMPLEX E/M VISIT ADD ON: CPT | Mod: NC

## 2025-07-21 PROCEDURE — 81003 URINALYSIS AUTO W/O SCOPE: CPT | Mod: QW

## 2025-07-21 PROCEDURE — 36415 COLL VENOUS BLD VENIPUNCTURE: CPT

## 2025-07-21 PROCEDURE — 99214 OFFICE O/P EST MOD 30 MIN: CPT

## 2025-07-21 RX ORDER — DESVENLAFAXINE 50 MG/1
50 TABLET, EXTENDED RELEASE ORAL
Refills: 0 | Status: ACTIVE | COMMUNITY
Start: 2025-07-21

## 2025-07-22 LAB
ALBUMIN SERPL ELPH-MCNC: 4.6 G/DL
ALP BLD-CCNC: 127 U/L
ALT SERPL-CCNC: 19 U/L
ANION GAP SERPL CALC-SCNC: 16 MMOL/L
APPEARANCE: ABNORMAL
AST SERPL-CCNC: 18 U/L
BACTERIA: ABNORMAL /HPF
BASOPHILS # BLD AUTO: 0.04 K/UL
BASOPHILS NFR BLD AUTO: 0.4 %
BILIRUB SERPL-MCNC: 0.3 MG/DL
BILIRUBIN URINE: NEGATIVE
BLOOD URINE: NEGATIVE
BUN SERPL-MCNC: 12 MG/DL
CALCIUM OXALATE CRYSTALS: PRESENT
CALCIUM SERPL-MCNC: 10 MG/DL
CAST: 0 /LPF
CHLORIDE SERPL-SCNC: 103 MMOL/L
CO2 SERPL-SCNC: 23 MMOL/L
COLOR: YELLOW
CREAT SERPL-MCNC: 0.83 MG/DL
EGFRCR SERPLBLD CKD-EPI 2021: 82 ML/MIN/1.73M2
EOSINOPHIL # BLD AUTO: 0.11 K/UL
EOSINOPHIL NFR BLD AUTO: 1.2 %
EPITHELIAL CELLS: 9 /HPF
FOLATE SERPL-MCNC: 8.3 NG/ML
GLUCOSE QUALITATIVE U: NEGATIVE MG/DL
GLUCOSE SERPL-MCNC: 118 MG/DL
HCT VFR BLD CALC: 43.9 %
HGB BLD-MCNC: 14 G/DL
IMM GRANULOCYTES NFR BLD AUTO: 0.2 %
KETONES URINE: ABNORMAL MG/DL
LEUKOCYTE ESTERASE URINE: ABNORMAL
LYMPHOCYTES # BLD AUTO: 2.5 K/UL
LYMPHOCYTES NFR BLD AUTO: 26.5 %
MAN DIFF?: NORMAL
MCHC RBC-ENTMCNC: 29.6 PG
MCHC RBC-ENTMCNC: 31.9 G/DL
MCV RBC AUTO: 92.8 FL
MICROSCOPIC-UA: NORMAL
MONOCYTES # BLD AUTO: 0.44 K/UL
MONOCYTES NFR BLD AUTO: 4.7 %
NEUTROPHILS # BLD AUTO: 6.32 K/UL
NEUTROPHILS NFR BLD AUTO: 67 %
NITRITE URINE: NEGATIVE
PH URINE: 5.5
PLATELET # BLD AUTO: 310 K/UL
POTASSIUM SERPL-SCNC: 4.7 MMOL/L
PROT SERPL-MCNC: 7.1 G/DL
PROTEIN URINE: NORMAL MG/DL
RBC # BLD: 4.73 M/UL
RBC # FLD: 13.2 %
RED BLOOD CELLS URINE: NORMAL /HPF
REVIEW: NORMAL
SODIUM SERPL-SCNC: 142 MMOL/L
SPECIFIC GRAVITY URINE: 1.02
TSH SERPL-ACNC: 0.62 UIU/ML
URATE SERPL-MCNC: 6 MG/DL
UROBILINOGEN URINE: 0.2 MG/DL
VIT B12 SERPL-MCNC: 410 PG/ML
WBC # FLD AUTO: 9.43 K/UL
WHITE BLOOD CELLS URINE: 7 /HPF

## 2025-09-02 ENCOUNTER — NON-APPOINTMENT (OUTPATIENT)
Age: 55
End: 2025-09-02

## 2025-09-04 ENCOUNTER — APPOINTMENT (OUTPATIENT)
Dept: DERMATOLOGY | Facility: CLINIC | Age: 55
End: 2025-09-04
Payer: COMMERCIAL

## 2025-09-04 PROCEDURE — 99214 OFFICE O/P EST MOD 30 MIN: CPT
